# Patient Record
Sex: FEMALE | Race: BLACK OR AFRICAN AMERICAN
[De-identification: names, ages, dates, MRNs, and addresses within clinical notes are randomized per-mention and may not be internally consistent; named-entity substitution may affect disease eponyms.]

---

## 2018-01-24 ENCOUNTER — HOSPITAL ENCOUNTER (EMERGENCY)
Dept: HOSPITAL 92 - ERS | Age: 83
Discharge: HOME | End: 2018-01-24
Payer: MEDICARE

## 2018-01-24 DIAGNOSIS — Z79.891: ICD-10-CM

## 2018-01-24 DIAGNOSIS — I10: ICD-10-CM

## 2018-01-24 DIAGNOSIS — Z79.84: ICD-10-CM

## 2018-01-24 DIAGNOSIS — E78.5: ICD-10-CM

## 2018-01-24 DIAGNOSIS — N39.0: Primary | ICD-10-CM

## 2018-01-24 DIAGNOSIS — E11.9: ICD-10-CM

## 2018-01-24 LAB
ALBUMIN SERPL BCG-MCNC: 3.5 G/DL (ref 3.4–4.8)
ALP SERPL-CCNC: 49 U/L (ref 40–150)
ALT SERPL W P-5'-P-CCNC: (no result) U/L (ref 8–55)
ANION GAP SERPL CALC-SCNC: 13 MMOL/L (ref 10–20)
AST SERPL-CCNC: 12 U/L (ref 5–34)
BASOPHILS # BLD AUTO: 0 THOU/UL (ref 0–0.2)
BASOPHILS NFR BLD AUTO: 0.6 % (ref 0–1)
BILIRUB SERPL-MCNC: 0.2 MG/DL (ref 0.2–1.2)
BUN SERPL-MCNC: 27 MG/DL (ref 9.8–20.1)
CALCIUM SERPL-MCNC: 9.6 MG/DL (ref 7.8–10.44)
CHLORIDE SERPL-SCNC: 103 MMOL/L (ref 98–107)
CK MB SERPL-MCNC: 0.6 NG/ML (ref 0–6.6)
CO2 SERPL-SCNC: 27 MMOL/L (ref 23–31)
CREAT CL PREDICTED SERPL C-G-VRATE: 0 ML/MIN (ref 70–130)
CRYSTAL-AUWI FLAG: 1.4 (ref 0–15)
EOSINOPHIL # BLD AUTO: 0.2 THOU/UL (ref 0–0.7)
EOSINOPHIL NFR BLD AUTO: 2.3 % (ref 0–10)
GLOBULIN SER CALC-MCNC: 3.9 G/DL (ref 2.4–3.5)
GLUCOSE SERPL-MCNC: 181 MG/DL (ref 83–110)
HEV IGM SER QL: 5.8 (ref 0–7.99)
HGB BLD-MCNC: 11.1 G/DL (ref 12–16)
HYALINE CASTS #/AREA URNS LPF: (no result) LPF
LYMPHOCYTES # BLD: 3 THOU/UL (ref 1.2–3.4)
LYMPHOCYTES NFR BLD AUTO: 41.5 % (ref 21–51)
MCH RBC QN AUTO: 31 PG (ref 27–31)
MCV RBC AUTO: 95.2 FL (ref 81–99)
MONOCYTES # BLD AUTO: 0.5 THOU/UL (ref 0.11–0.59)
MONOCYTES NFR BLD AUTO: 7.2 % (ref 0–10)
NEUTROPHILS # BLD AUTO: 3.4 THOU/UL (ref 1.4–6.5)
NEUTROPHILS NFR BLD AUTO: 48.4 % (ref 42–75)
PATHC CAST-AUWI FLAG: 0.13 (ref 0–2.49)
PLATELET # BLD AUTO: 185 THOU/UL (ref 130–400)
POTASSIUM SERPL-SCNC: 3.8 MMOL/L (ref 3.5–5.1)
RBC # BLD AUTO: 3.58 MILL/UL (ref 4.2–5.4)
RBC UR QL AUTO: (no result) HPF (ref 0–3)
SODIUM SERPL-SCNC: 139 MMOL/L (ref 136–145)
SP GR UR STRIP: 1.02 (ref 1–1.04)
SPERM-AUWI FLAG: 0 (ref 0–9.9)
TROPONIN I SERPL DL<=0.01 NG/ML-MCNC: 0.02 NG/ML (ref ?–0.03)
WBC # BLD AUTO: 7.1 THOU/UL (ref 4.8–10.8)
WBC UR QL AUTO: (no result) HPF (ref 0–3)
YEAST-AUWI FLAG: 0 (ref 0–25)

## 2018-01-24 PROCEDURE — 81003 URINALYSIS AUTO W/O SCOPE: CPT

## 2018-01-24 PROCEDURE — 82140 ASSAY OF AMMONIA: CPT

## 2018-01-24 PROCEDURE — 87086 URINE CULTURE/COLONY COUNT: CPT

## 2018-01-24 PROCEDURE — 71046 X-RAY EXAM CHEST 2 VIEWS: CPT

## 2018-01-24 PROCEDURE — 84484 ASSAY OF TROPONIN QUANT: CPT

## 2018-01-24 PROCEDURE — 83605 ASSAY OF LACTIC ACID: CPT

## 2018-01-24 PROCEDURE — 96360 HYDRATION IV INFUSION INIT: CPT

## 2018-01-24 PROCEDURE — 85025 COMPLETE CBC W/AUTO DIFF WBC: CPT

## 2018-01-24 PROCEDURE — 93005 ELECTROCARDIOGRAM TRACING: CPT

## 2018-01-24 PROCEDURE — 36415 COLL VENOUS BLD VENIPUNCTURE: CPT

## 2018-01-24 PROCEDURE — 82550 ASSAY OF CK (CPK): CPT

## 2018-01-24 PROCEDURE — 70450 CT HEAD/BRAIN W/O DYE: CPT

## 2018-01-24 PROCEDURE — 82553 CREATINE MB FRACTION: CPT

## 2018-01-24 PROCEDURE — 80053 COMPREHEN METABOLIC PANEL: CPT

## 2018-01-24 PROCEDURE — 81015 MICROSCOPIC EXAM OF URINE: CPT

## 2018-01-24 NOTE — CT
CT BRAIN 

1/24/18

 

PROVIDED CLINICAL HISTORY:  

Altered mental status.

 

FINDINGS:  

Comparison made with the study dated 7/25/17. 

 

The ventricular system is normal in size and morphology. There is no evidence for intracranial hemorr
michelle or mass effect. There is partial opacification of the right frontal sinus and right sided ethmoi
d air cells as well as frontal ethmoidal recess,. The extracranial soft tissues and osseous structure
s appear otherwise unremarkable. 

 

IMPRESSION:  

No evidence for intracranial hemorrhage or mass effect. 

 

POS: [facility]

## 2018-01-24 NOTE — RAD
TWO VIEWS CHEST

1/24/18

 

PROVIDED CLINICAL HISTORY:  

Altered mental status. 

 

FINDINGS:  

Comparison 4/9/17.

 

Cardiac and mediastinal silhouette is unchanged in appearance. Atherosclerosis is noted involving the
 aortic arch. There is no focal consolidation, pleural fluid or pneumothorax apparent. 

 

IMPRESSION:  

No evidence for an acute cardiopulmonary process. 

 

POS: [facility]

## 2018-01-26 ENCOUNTER — HOSPITAL ENCOUNTER (INPATIENT)
Dept: HOSPITAL 92 - ERS | Age: 83
LOS: 4 days | Discharge: HOME HEALTH | DRG: 71 | End: 2018-01-30
Attending: HOSPITALIST | Admitting: HOSPITALIST
Payer: MEDICARE

## 2018-01-26 VITALS — BODY MASS INDEX: 37.4 KG/M2

## 2018-01-26 DIAGNOSIS — R74.8: ICD-10-CM

## 2018-01-26 DIAGNOSIS — D64.9: ICD-10-CM

## 2018-01-26 DIAGNOSIS — E78.5: ICD-10-CM

## 2018-01-26 DIAGNOSIS — E87.1: ICD-10-CM

## 2018-01-26 DIAGNOSIS — Z86.711: ICD-10-CM

## 2018-01-26 DIAGNOSIS — J84.10: ICD-10-CM

## 2018-01-26 DIAGNOSIS — E11.65: ICD-10-CM

## 2018-01-26 DIAGNOSIS — E78.00: ICD-10-CM

## 2018-01-26 DIAGNOSIS — E66.9: ICD-10-CM

## 2018-01-26 DIAGNOSIS — I25.10: ICD-10-CM

## 2018-01-26 DIAGNOSIS — Z96.652: ICD-10-CM

## 2018-01-26 DIAGNOSIS — E87.2: ICD-10-CM

## 2018-01-26 DIAGNOSIS — I13.0: ICD-10-CM

## 2018-01-26 DIAGNOSIS — Z91.19: ICD-10-CM

## 2018-01-26 DIAGNOSIS — N17.9: ICD-10-CM

## 2018-01-26 DIAGNOSIS — I50.32: ICD-10-CM

## 2018-01-26 DIAGNOSIS — Z88.0: ICD-10-CM

## 2018-01-26 DIAGNOSIS — G93.41: Primary | ICD-10-CM

## 2018-01-26 DIAGNOSIS — M19.90: ICD-10-CM

## 2018-01-26 DIAGNOSIS — Z66: ICD-10-CM

## 2018-01-26 DIAGNOSIS — N18.9: ICD-10-CM

## 2018-01-26 DIAGNOSIS — D72.829: ICD-10-CM

## 2018-01-26 DIAGNOSIS — I27.20: ICD-10-CM

## 2018-01-26 DIAGNOSIS — E83.42: ICD-10-CM

## 2018-01-26 LAB
ALBUMIN SERPL BCG-MCNC: 3 G/DL (ref 3.4–4.8)
ALP SERPL-CCNC: 42 U/L (ref 40–150)
ALT SERPL W P-5'-P-CCNC: (no result) U/L (ref 8–55)
ANION GAP SERPL CALC-SCNC: 13 MMOL/L (ref 10–20)
AST SERPL-CCNC: 16 U/L (ref 5–34)
BASOPHILS # BLD AUTO: 0 THOU/UL (ref 0–0.2)
BASOPHILS NFR BLD AUTO: 0.3 % (ref 0–1)
BILIRUB SERPL-MCNC: 0.3 MG/DL (ref 0.2–1.2)
BUN SERPL-MCNC: 35 MG/DL (ref 9.8–20.1)
CALCIUM SERPL-MCNC: 8.5 MG/DL (ref 7.8–10.44)
CHLORIDE SERPL-SCNC: 105 MMOL/L (ref 98–107)
CK MB SERPL-MCNC: 3.1 NG/ML (ref 0–6.6)
CK SERPL-CCNC: 527 U/L (ref 29–168)
CO2 SERPL-SCNC: 24 MMOL/L (ref 23–31)
CREAT CL PREDICTED SERPL C-G-VRATE: 0 ML/MIN (ref 70–130)
DRUG SCREEN CUTOFF: (no result)
EOSINOPHIL # BLD AUTO: 0.7 THOU/UL (ref 0–0.7)
EOSINOPHIL NFR BLD AUTO: 5.3 % (ref 0–10)
GLOBULIN SER CALC-MCNC: 3.6 G/DL (ref 2.4–3.5)
GLUCOSE SERPL-MCNC: 174 MG/DL (ref 83–110)
HGB BLD-MCNC: 10.2 G/DL (ref 12–16)
LYMPHOCYTES # BLD: 1.7 THOU/UL (ref 1.2–3.4)
LYMPHOCYTES NFR BLD AUTO: 13.8 % (ref 21–51)
MAGNESIUM SERPL-MCNC: 1.5 MG/DL (ref 1.6–2.6)
MCH RBC QN AUTO: 31.4 PG (ref 27–31)
MCV RBC AUTO: 96 FL (ref 81–99)
MEDTOX CONTROL LINE VALID?: (no result)
MEDTOX READER #: (no result)
MONOCYTES # BLD AUTO: 0.5 THOU/UL (ref 0.11–0.59)
MONOCYTES NFR BLD AUTO: 4.3 % (ref 0–10)
NEUTROPHILS # BLD AUTO: 9.6 THOU/UL (ref 1.4–6.5)
NEUTROPHILS NFR BLD AUTO: 76.3 % (ref 42–75)
PLATELET # BLD AUTO: 158 THOU/UL (ref 130–400)
POTASSIUM SERPL-SCNC: 3.6 MMOL/L (ref 3.5–5.1)
RBC # BLD AUTO: 3.24 MILL/UL (ref 4.2–5.4)
SODIUM SERPL-SCNC: 138 MMOL/L (ref 136–145)
SP GR UR STRIP: 1.02 (ref 1–1.04)
TROPONIN I SERPL DL<=0.01 NG/ML-MCNC: 0.04 NG/ML (ref ?–0.03)
TROPONIN I SERPL DL<=0.01 NG/ML-MCNC: 0.04 NG/ML (ref ?–0.03)
WBC # BLD AUTO: 12.6 THOU/UL (ref 4.8–10.8)

## 2018-01-26 PROCEDURE — 80048 BASIC METABOLIC PNL TOTAL CA: CPT

## 2018-01-26 PROCEDURE — 70450 CT HEAD/BRAIN W/O DYE: CPT

## 2018-01-26 PROCEDURE — 83880 ASSAY OF NATRIURETIC PEPTIDE: CPT

## 2018-01-26 PROCEDURE — 93880 EXTRACRANIAL BILAT STUDY: CPT

## 2018-01-26 PROCEDURE — 81003 URINALYSIS AUTO W/O SCOPE: CPT

## 2018-01-26 PROCEDURE — 84484 ASSAY OF TROPONIN QUANT: CPT

## 2018-01-26 PROCEDURE — 80306 DRUG TEST PRSMV INSTRMNT: CPT

## 2018-01-26 PROCEDURE — 76770 US EXAM ABDO BACK WALL COMP: CPT

## 2018-01-26 PROCEDURE — 85025 COMPLETE CBC W/AUTO DIFF WBC: CPT

## 2018-01-26 PROCEDURE — 83605 ASSAY OF LACTIC ACID: CPT

## 2018-01-26 PROCEDURE — 82553 CREATINE MB FRACTION: CPT

## 2018-01-26 PROCEDURE — 80053 COMPREHEN METABOLIC PANEL: CPT

## 2018-01-26 PROCEDURE — A4216 STERILE WATER/SALINE, 10 ML: HCPCS

## 2018-01-26 PROCEDURE — 94640 AIRWAY INHALATION TREATMENT: CPT

## 2018-01-26 PROCEDURE — 94664 DEMO&/EVAL PT USE INHALER: CPT

## 2018-01-26 PROCEDURE — 71045 X-RAY EXAM CHEST 1 VIEW: CPT

## 2018-01-26 PROCEDURE — 93306 TTE W/DOPPLER COMPLETE: CPT

## 2018-01-26 PROCEDURE — 83735 ASSAY OF MAGNESIUM: CPT

## 2018-01-26 PROCEDURE — 36415 COLL VENOUS BLD VENIPUNCTURE: CPT

## 2018-01-26 PROCEDURE — 80061 LIPID PANEL: CPT

## 2018-01-26 PROCEDURE — 87086 URINE CULTURE/COLONY COUNT: CPT

## 2018-01-26 PROCEDURE — 82550 ASSAY OF CK (CPK): CPT

## 2018-01-26 PROCEDURE — 36416 COLLJ CAPILLARY BLOOD SPEC: CPT

## 2018-01-26 PROCEDURE — 70551 MRI BRAIN STEM W/O DYE: CPT

## 2018-01-26 PROCEDURE — 93005 ELECTROCARDIOGRAM TRACING: CPT

## 2018-01-26 PROCEDURE — 51701 INSERT BLADDER CATHETER: CPT

## 2018-01-26 NOTE — RAD
AP VIEW OF THE CHEST

1/26/18

 

INDICATION:

Weakness. 

 

COMPARISON:  

Prior exam dated 1/24/18.

 

FINDINGS:  

There is cardiomegaly with pulmonary vascular congestion and perihilar edema. There is small bilatera
l pleural effusions, left greater than right. Right total shoulder replacement is similar. Diffuse os
teopenia is similar. 

 

IMPRESSION:  

Findings suggesting mild CHF. 

 

POS: [facility]

## 2018-01-26 NOTE — CT
NONCONTRAST HEAD CT

1/26/18

 

HISTORY: 

Right sided weakness and facial droop. Altered mental status.

 

COMPARISON:  

1/24/18.

 

TECHNIQUE:  

A noncontrast head CT is performed from skull base to skull vertex.

 

FINDINGS:  

No parenchymal hemorrhage. No extra-axial hematoma.  No midline shift. Basilar cisterns are patent. B
rain volume is age appropriate. Cortical gray-white matter differentiation is preserved. 

 

Ventricles and sulci are patent and symmetric. Stable sclerosis of the right frontal bone. Stable opa
cification of the right frontal sinus and anterior right ethmoid air cells. Adequate aeration of the 
mastoid air cells. 

 

IMPRESSION:  

No acute intracranial process. Stable changes in the right paranasal sinuses along with stable change
s of the osseous margins of the right frontal sinus suggesting chronic sinus disease.

 

POS: [facility]

## 2018-01-27 LAB
CHD RISK SERPL-RTO: 3 (ref ?–4.5)
CHOLEST SERPL-MCNC: 130 MG/DL
HDLC SERPL-MCNC: 44 MG/DL
LDLC SERPL CALC-MCNC: 68 MG/DL
TRIGL SERPL-MCNC: 92 MG/DL (ref ?–150)
TROPONIN I SERPL DL<=0.01 NG/ML-MCNC: 0.04 NG/ML (ref ?–0.03)

## 2018-01-27 PROCEDURE — B030ZZZ MAGNETIC RESONANCE IMAGING (MRI) OF BRAIN: ICD-10-PCS | Performed by: RADIOLOGY

## 2018-01-27 RX ADMIN — Medication SCH ML: at 09:46

## 2018-01-27 RX ADMIN — MOMETASONE FUROATE AND FORMOTEROL FUMARATE DIHYDRATE SCH PUFF: 200; 5 AEROSOL RESPIRATORY (INHALATION) at 07:21

## 2018-01-27 RX ADMIN — MOMETASONE FUROATE AND FORMOTEROL FUMARATE DIHYDRATE SCH PUFF: 200; 5 AEROSOL RESPIRATORY (INHALATION) at 19:30

## 2018-01-27 RX ADMIN — Medication SCH: at 21:30

## 2018-01-27 RX ADMIN — HYDROCODONE BITARTRATE AND ACETAMINOPHEN PRN TAB: 10; 325 TABLET ORAL at 13:55

## 2018-01-27 RX ADMIN — HEPARIN SODIUM SCH UNITS: 5000 INJECTION, SOLUTION INTRAVENOUS; SUBCUTANEOUS at 21:29

## 2018-01-27 RX ADMIN — HEPARIN SODIUM SCH UNITS: 5000 INJECTION, SOLUTION INTRAVENOUS; SUBCUTANEOUS at 09:46

## 2018-01-27 NOTE — HP
PRIMARY CARE DOCTOR:  None reported.

 

CODE STATUS:  The patient has expressed DNR/DNI status in front of her daughter.

 

CHIEF COMPLAINT:  Participation was right-sided weakness.

 

HISTORY OF PRESENT ILLNESS:  This is an 89-year-old female patient with past 
medical history of diabetes, hyperlipidemia, hypertension, pulmonary embolism 
years ago who came to the hospital after having right-sided weakness, 
associated with right-sided facial droop, symptoms has improved, lasted for few 
hours, no clear tears, no alleviating factors, gait was not affected.  As noted
, the patient had been treated for UTI 2 days ago.

 

REVIEW OF SYSTEMS: 

Constitutional:  The patient reported generalized weakness.

RESPIRATORY:  No cough, sputum production, no shortness of breath.

CARDIOVASCULAR:  No chest pain or palpitations.  No shortness of breath.

GASTROINTESTINAL:  No nausea or vomiting.  No diarrhea, no abdominal pain.

CNS:  No dizziness, headache.  The patient is not feeling lightheaded.  The 
patient had right-sided weakness and right-sided facial droop.

GENITOURINARY:  On burning with urination.

EXTREMITIES:  Leg swelling.

All other systems reviewed were negative except for the finding mentioned above.

 

PAST MEDICAL HISTORY:  Please see HPI.

 

SOCIAL HISTORY:  The patient had no history of alcohol use, known drug use.  No 
smoking history.

 

PAST SURGICAL HISTORY:  Left knee, back surgery, carpal tunnel surgery, 
shoulder and neck surgery.

 

PSYCHIATRIC HISTORY:  No previous psychiatric history.

 

KNOWN ALLERGIES:  To PENICILLIN.

 

REPORTED MEDICATIONS:  Amlodipine/valsartan 5 mg/320 mg 1 tablet daily, 
citalopram 10 mg 1 tablet once a day, metformin 500 mg 1 tablet once a day, 
Crestor 10 mg 1 tablet once a day, gabapentin 300 mg 1 tablet orally 3 times a 
day, hydralazine 50 mg the patient takes 75 mg 3 times a day, carvedilol 12.5 
mg 2 times a day, Advair Diskus 250mcg/50 mcg unknown dose, Lasix 20 mg orally 
once a day, Klor-Con 20 mEq once a day, fentanyl 72 hours patch the patient use 
75 mcg transdermal, Macrobid 100 mg 1 tablet orally 2 times a day.

 

PHYSICAL EXAMINATION:

VITAL SIGNS:  Blood pressure 101/49 with heart rate 68, respiratory rate 20, 
temperature 98, pain 0/10, oxygen saturation 100 on room air, blood pressure 
has been fluctuating between the 90s and 100s.

GENERAL APPEARANCE:  The patient is alert, oriented, no acute distress.

HEENT:  Eye:  Normal conjunctivae.  Moist mucous membranes.

NECK:  Anicteric.  No JVD.

RESPIRATORY:  Bilateral air entry.  No rales, bilateral wheezing, symmetric 
expansion.

CARDIOVASCULAR:  Normal rate, regular rhythm.  No murmurs or gallops.  No edema.

ABDOMEN:  Soft.  Normal bowel sounds.

MUSCULOSKELETAL:  Baseline range of motion and strength.  No tenderness.

NEUROLOGIC:  Baseline sensory.  No evidence of any new focal weakness.  
Baseline speech.  Cranial nerves seem to be intact.

PSYCHIATRIC:  The patient is in good mood.  No anxiety, oriented, optimal 
judgment.

 

LABORATORY DATA:  Reviewed.  The patient has white count 12.6, hemoglobin 10.2, 
platelet count 158.  Sodium 138, potassium 3.6, chloride 105, anion gap 13, BUN 
35, creatinine 2.06, GFR 27, glucose 174, lactic acid 1.9, calcium 9.5, 
magnesium 1.5, total bilirubin 0.3, AST 16, ALT 7, alkaline phosphatase 42.  CK 
527, troponin 0.036, second troponin 0.035, beta-natriuretic peptide 206.3.  
Serum total protein 6.6, albumin 3.0, globulin 3.6, albumin globulin ratio 0.8.
  Urine was reviewed and was negative.  Toxicology was reviewed and the patient 
has opiates in urine.  EKG was reviewed.  The patient has sinus arrhythmia in 
rate of 65.  No evidence of any acute ischemia.  This was discussed with her 
doctor.  Chest x-ray suggested the finding of mild CHF.  Brain CT showed no 
acute intracranial process, stable changes in the right paranasal sinuses along 
with stable changes of the _____ margin of the right frontal sinus suggesting 
chronic sinus disease.

 

ASSESSMENT AND PLAN:

1.  Possible transient ischemic attack.  The patient has right-sided numbness 
and tingling and right-sided facial droop, we will do stroke protocol, 
management depending on findings.this place pt at high risk given new 
neurological symptoms, 

2.  Leukocytosis, unclear etiology.  The patient had a recent urinary tract 
infection; however, urinary analysis was negative today.  We will send cultures 
and adjust medications as needed.

3.  Normocytic anemia, this is chronic.  The patient seems to have chronic 
kidney disease, could be secondary to it, can be managed as outpatient.

4.  Chronic kidney disease and previous records reviewed.  The BUN and 
creatinine has been increasing slowly.  It might be a component of acute kidney 
injury now.  The patient is showing congestive heart failure findings on chest x
-ray, so we are limited with fluids.  Might need Nephrology evaluation if not 
improving.

5.  Uncontrolled diabetes with blood sugar 174, hyperglycemia, we will treat 
with sliding scale, reconcile home medications.

6.  Acute hypomagnesemia.  The patient to receive magnesium replacement.

7.  Mildly elevated troponins in the range of 0.036, 0.035, may need Cardiology 
evaluation in the morning.  No chest pain.

8.  Possible congestive heart failure with beta-natriuretic peptide of 206, 
reconcile home medications.  The patient is in kidney failure.  We will not 
diurese aggressively, might need Nephrology assistance for diuresis if needed.

9.  Deep venous thrombosis prophylaxis.

 

[facility]

## 2018-01-27 NOTE — MRI
NONCONTRAST ENHANCED MRI BRAIN:

 

Date:  01/27/18 

 

HISTORY:  

89-year-old with history of TIA versus stroke. Generalized weakness. 

 

TECHNIQUE:  

Multiplanar, multisequence noncontrast enhanced MRI brain obtained. 

 

COMPARISON:  

Previous MRI from 11/22/16. 

 

FINDINGS:

Images demonstrate paranasal sinus disease in the right frontal sinus, as well as extensive paranasal
 sinus disease in right and left ethmoid sinuses. The patient has had bilateral cataract surgeries. 

 

The brain demonstrates old areas of infarction in the inferior cerebellar regions. There is age-appro
priate cortical atrophy. No evidence of acute intracranial masses, hemorrhages, or strokes seen. No e
vidence of areas of diffusion restriction seen. 

 

IMPRESSION: 

1.  Old areas of lacunar infarction in cerebellum. 

2.  No evidence of acute intracranial strokes, hemorrhages, or lesions seen. 

 

 

 

POS: [facility]

## 2018-01-27 NOTE — CON
DATE OF CONSULTATION:  01/27/2018

 

INDICATION FOR CONSULTATION:  An 89-year-old female with a TIA with indeterminate cardiac enzymes wit
h a history of coronary artery disease, we were asked to see her in consultation.

 

HISTORY OF PRESENT ILLNESS:  This is a very unfortunate 89-year-old female who was admitted after, I 
believe, some mental status changes were noted.  She also has some right-sided weakness.  She was in 
the emergency room.  There has been no acute findings on the CT scan and appears that she has possibl
y suffered a TIA, but she did not have any significant specific findings that would indicate any type
 of CVA at this time and appears to have been already resolved.  She has a long history of multiple m
edical problems, which include diabetes, chronic pain syndrome, and chronic kidney disease.  She has 
had a history of coronary artery disease.  She had, I believe, a cardiac catheterization in 2007 whic
h showed a 20% proximal right coronary artery lesion and she did not have any significant disease.  S
he had an echocardiogram in 2015, which showed an ejection fraction of 60-65%.  She does have a histo
ry of PE in 2007 as well as a history of COPD and some history of hypertension and peripheral neuropa
thy.  At this time, she appears to be very comfortable; howevear, her history of present illness is s
omewhat confused and is unable to give a clear history.

 

PAST MEDICAL HISTORY:  Significant for history of TIAs in the past, perhaps coronary artery disease, 
COPD, history of asbestos exposure, pulmonary embolus in 2007, hypertension, diabetes, peripheral jeremías
ropathy, chronic back pain, as well as chronic kidney disease.

 

PAST SURGICAL HISTORY:  She had hysterectomy, cholecystectomy, total left knee replacement, cataract 
surgery.  She had small bowel obstruction which required surgical correction.  Carpal tunnel release 
on the left side.

 

MEDICATIONS:  Prior to admission included Celexa, Neurontin, Crestor, metformin, Apresoline, insulin,
 Advair Diskus, Coreg, NovoLog, Humulin insulin 70/30, amlodipine, ipratropium, albuterol, DuoNebs, a
spirin 81 mg a day, hydrocodone/acetaminophen, furosemide, Lasix 20 mg tablets 40 mg b.i.d., Duragesi
c patches, tizanidine, and Coreg 12.5 mg 3 times a day.

 

ALLERGIES:  She is allergic to PENICILLIN.

 

SOCIAL HISTORY:  No history of alcohol or tobacco abuse.

 

FAMILY HISTORY:  Noncontributory.

 

REVIEW OF SYSTEMS:  A 12-point review of systems is difficult to obtain this patient who appears to b
e somewhat confused and unable to give a clear history or review of systems.

 

LABORATORY AND X-RAY FINDINGS:  Indicates hemoglobin of 10.2.  Her cardiac enzymes are indeterminate,
 the highest is 0.038.  Her BNP is 206, which is not comparable with significant congestive heart anabela
lure.  This may be some mild heart failure.  Her LDL was 68.  Her creatinine was 2.06 with BUN of 35.
  Blood sugar was 174, total CK was 527.

 

PHYSICAL EXAMINATION:

GENERAL:  Reveals an elderly female, who is in no acute distress.

VITAL SIGNS:  Blood pressure is 99/39, earlier was 122/66.  O2 saturation 98%, respiratory rate was 2
0, heart rate is 61 and regular.  She is afebrile.

HEENT:  Reveals the head to be normocephalic and atraumatic.  Carotid pulses are present.  Does not h
ear any bruits.

CHEST:  Clear to auscultation.

CARDIOVASCULAR:  Exam reveals a regular rhythm, somewhat bradycardiac.  Heart sounds are distant.  Ve
ry soft systolic murmur at the apex, otherwise unremarkable.

ABDOMEN:  Shows obesity with positive bowel sounds.  No organomegaly or masses are noted.  Femoral pu
lses are difficult to palpate due to large pannus.

EXTREMITIES:  Showed no clubbing, cyanosis, or edema.  I cannot palpate pedal pulses.

NEUROLOGIC:  The patient appears to be somewhat confused.  This may be just overall dementia.  Do not
 have any baseline.  There were no family members present.

 

IMPRESSION:

1.  Abnormal cardiac enzymes which would not be too unremarkable in someone who has chronic kidney di
sease and also has an elevated CK, uncertain the elevation of CK when she had a fall or not, but she 
had elevated CK also.  At this time, we would just continue to monitor her.  She did have some mild c
oronary artery disease several years ago, be unlikely for developed severe coronary artery disease in
 such a short amount of time given her overall age.  At this time, there will be no further cardiac e
valuation indicated.  As far as coronary artery disease is concerned, we will obtain echocardiogram a
nd we will evaluate that and further recommendations will be pending versus the results of the echoca
rdiogram.

2.  Diabetes.  This will be dealt with by the primary care service.

3.  Hypertension.  This is actually on the low side.  At this time, she is hypotensive and will need 
to follow this very carefully.  She may need more volume.  Once we evaluate the echocardiogram, we ca
n determine whether or not she may be volume depleted and would not appear so, looks like she just ha
s chronic kidney disease.

4.  History in the past of pulmonary emboli.

5.  History of hypercholesterolemia.  We would continue her present medications.

 

We will be more than happy to continue to follow the patient with you through her hospital course.  LILLIANA traylor will be very conservative in care of this patient without any aggressive cardiac interventions at t
his time.

## 2018-01-27 NOTE — ULT
ULTRASOUND RENAL BILATERAL:

 

Date:  01/27/18 

 

HISTORY:  

Kidney failure. 

 

COMPARISON:  

None. 

 

FINDINGS:

Right kidney measures 10.9 x 3.9 x 3.9 cm. Left kidney measures 11.4 x 5.7 x 5.8 cm. Pre-void urinary
 bladder volume is 235 mL. 

 

No hydronephrosis. Superior pole left kidney has a simple cyst, exophytic, measuring 4.3 x 3.5 x 3.5 
cm. 

 

IMPRESSION: 

Large superior pole simple cyst left kidney. 

 

 

 

POS: [facility]

## 2018-01-27 NOTE — CON
DATE OF CONSULTATION:  01/27/2018

 

CONSULTING PHYSICIAN:  Hospitalist Service

 

IMPRESSION:

1.  Possible transient ischemic attack with transient right-sided weakness.

2.  Diabetes.

3.  Hypotension.

4.  Chronic pain.

5.  Renal insufficiency.

 

PLAN:

1.  Restart aspirin and Crestor.

2.  Echocardiogram.

3.  Carotid ultrasound.

4.  MRI of the brain.

 

HISTORY OF PRESENT ILLNESS:  Ms. Edwards is an 89-year-old black female with the above noted medical pr
oblems.  Earlier this week, she had some mental status changes and was seen in the emergency room.  S
he was diagnosed with a urinary tract infection and treated with antibiotics.  She was discharged marilyn
e.  Daughter reported that she seemed to develop some right-sided weakness yesterday involving both a
rm and the leg.  She brought her back into the Emergency Room via ambulance.  Reportedly, she was hyp
otensive en route.  She had a workup in the ER including a CT scan of the brain and lab work, nothing
 remarkable was found other than the renal insufficiency.  Her daughter states that she had discontin
ued all of her medications for about 5 days prior to this event since admission.  At this point, she 
does not report any lateralized weakness or numbness.  She also does not report any headache, nausea,
 vomiting, vertigo, or difficulty swallowing.

 

PAST MEDICAL HISTORY:  As listed above.

 

ALLERGIES:  PENICILLIN.

 

MEDICATIONS:  List was reviewed.

 

SOCIAL HISTORY:  No tobacco or alcohol use.

 

FAMILY HISTORY:  Noncontributory.

 

REVIEW OF SYSTEMS:  Chronic back pain.  No complaints of chest pain or shortness of breath.

 

PHYSICAL EXAMINATION:

VITAL SIGNS:  Blood pressure 127/54, pulse 63, respirations 20, temperature 97.9.

HEENT:  Pupils are equal and minimally reactive.  Conjunctivae are a bit muddy.  Oropharynx is clear.


NECK:  No lymphadenopathy noted.

EXTREMITIES:  No cyanosis noted.

NEUROLOGIC:  She is alert and cooperative.  Her speech is fluent and clear.  Cranial nerves II throug
h XII are intact.  Motor exam showed symmetric antigravity strength in arms and legs.  Sensation was 
intact to light touch.  No tremor or dysmetria was present.  Gait was not tested.  No abnormal moveme
nts were seen.

 

SUMMARY:  An elderly lady who presented with possible right-sided weakness that has since improved.  
The hypotension may have been a contributing factor.  She was off her usual medications which include
 aspirin and Crestor.  I would restart these and complete her workup.

## 2018-01-28 LAB
ANION GAP SERPL CALC-SCNC: 13 MMOL/L (ref 10–20)
BASOPHILS # BLD AUTO: 0 THOU/UL (ref 0–0.2)
BASOPHILS NFR BLD AUTO: 0.2 % (ref 0–1)
BUN SERPL-MCNC: 48 MG/DL (ref 9.8–20.1)
CALCIUM SERPL-MCNC: 8.6 MG/DL (ref 7.8–10.44)
CHLORIDE SERPL-SCNC: 103 MMOL/L (ref 98–107)
CO2 SERPL-SCNC: 22 MMOL/L (ref 23–31)
CREAT CL PREDICTED SERPL C-G-VRATE: 23 ML/MIN (ref 70–130)
EOSINOPHIL # BLD AUTO: 0.5 THOU/UL (ref 0–0.7)
EOSINOPHIL NFR BLD AUTO: 6.8 % (ref 0–10)
GLUCOSE SERPL-MCNC: 132 MG/DL (ref 83–110)
HGB BLD-MCNC: 9.5 G/DL (ref 12–16)
LYMPHOCYTES # BLD: 1.6 THOU/UL (ref 1.2–3.4)
LYMPHOCYTES NFR BLD AUTO: 20.9 % (ref 21–51)
MCH RBC QN AUTO: 30.9 PG (ref 27–31)
MCV RBC AUTO: 95.8 FL (ref 81–99)
MONOCYTES # BLD AUTO: 0.6 THOU/UL (ref 0.11–0.59)
MONOCYTES NFR BLD AUTO: 7.3 % (ref 0–10)
NEUTROPHILS # BLD AUTO: 5 THOU/UL (ref 1.4–6.5)
NEUTROPHILS NFR BLD AUTO: 64.9 % (ref 42–75)
PLATELET # BLD AUTO: 147 THOU/UL (ref 130–400)
POTASSIUM SERPL-SCNC: 4.1 MMOL/L (ref 3.5–5.1)
RBC # BLD AUTO: 3.06 MILL/UL (ref 4.2–5.4)
SODIUM SERPL-SCNC: 134 MMOL/L (ref 136–145)
WBC # BLD AUTO: 7.7 THOU/UL (ref 4.8–10.8)

## 2018-01-28 RX ADMIN — Medication SCH: at 08:00

## 2018-01-28 RX ADMIN — ALUMINUM ZIRCONIUM TRICHLOROHYDREX GLY SCH: 0.2 STICK TOPICAL at 09:00

## 2018-01-28 RX ADMIN — MOMETASONE FUROATE AND FORMOTEROL FUMARATE DIHYDRATE SCH PUFF: 200; 5 AEROSOL RESPIRATORY (INHALATION) at 07:25

## 2018-01-28 RX ADMIN — MOMETASONE FUROATE AND FORMOTEROL FUMARATE DIHYDRATE SCH PUFF: 200; 5 AEROSOL RESPIRATORY (INHALATION) at 20:56

## 2018-01-28 RX ADMIN — HYDROCODONE BITARTRATE AND ACETAMINOPHEN PRN TAB: 10; 325 TABLET ORAL at 07:36

## 2018-01-28 RX ADMIN — HEPARIN SODIUM SCH: 5000 INJECTION, SOLUTION INTRAVENOUS; SUBCUTANEOUS at 21:23

## 2018-01-28 RX ADMIN — HEPARIN SODIUM SCH: 5000 INJECTION, SOLUTION INTRAVENOUS; SUBCUTANEOUS at 09:00

## 2018-01-28 RX ADMIN — Medication SCH ML: at 20:15

## 2018-01-28 RX ADMIN — ASPIRIN SCH: 81 TABLET ORAL at 09:00

## 2018-01-28 NOTE — ULT
ULTRASOUND CAROTID DOPPLER:

 

Date:  01/28/18/

 

HISTORY:  

TIA. 

 

COMPARISON:  

None. 

 

FINDINGS:

There is low graft plaque of the proximal internal carotid arteries bilaterally. Antegrade flow both 
vertebral arteries. 

 

Elevated peak systolic velocity within the left proximal and mid internal carotid artery. 

 

Right ICA/CCA ratio is 0.94. 

Left ICA/CCA ratio is 1.47. 

 

IMPRESSION: 

There is moderate, 50-69%, stenosis of the left internal carotid artery, proximal and mid portions. C
T angiogram may be beneficial. 

 

CODE T. 

 

 

 

 

POS: [facility]

## 2018-01-28 NOTE — PDOC.CTH
<Judy Pagan - Last Filed: 01/28/18 19:58>





Cardiology Progress Note





- Subjective





The pt seen and examined.  She is having severe SOB with wheezing after 

receiving about 400ml NS today.  She has to sit right up to breath now.  Per RN

, she also had low grade temp and greenish sputum.  





- Objective


 Vital Signs











  Temp Pulse Resp BP BP Pulse Ox


 


 01/28/18 15:40  99.4 F  65  18   143/64 H  97


 


 01/28/18 12:00  98.7 F  60  18   118/63  95


 


 01/28/18 10:40     117/60  


 


 01/28/18 09:00   113 H    











 











 01/27/18 01/28/18 01/29/18





 06:59 06:59 06:59


 


Intake Total   800


 


Balance   800














- Physical Examination


General/Neuro: alert & oriented x3


Neck: no JVD present


Lungs: other: (Wheezing)


Heart: RRR


Abdomen: soft


Extremities: other: (No edema)





- Telemetry


Telemetry Rhythm: SR 





- Labs


Result Diagrams: 


 01/28/18 05:10





 01/28/18 05:10


 Troponin/CKMB











CK-MB (CK-2)  3.1 ng/mL (0-6.6)   01/26/18  16:28    


 


Troponin I  0.035 ng/mL (< 0.028)  H  01/26/18  23:58    














- Assessment/Plan





1. SOB w/ wheezing - Wheezing after receiving 400ml NS today; also per RN, the 

pt had low-grade temp with greenish sputum today.  EF during this admission was 

60-65%; STAT CXR now; Possible Bopap? managed by PCP


2. TIA - stable; managed by neourologist


3. HTN - stable with Coreg, but the pt's family has refused Norvasc.


4. DM type 2 - on ACHS BG check with Metformin; managed by PCP


5. MARYLU - managed by nephrologist


6. Hyperlipidemia - on Statin


7. Hx of PE - 


MAR reviewed





Review of Systems





- Review of Systems


Constitutional: reports: weakness


EENTM: reports: no symptoms reported


Respiratory: reports: see HPI


Cardiac (ROS): reports: no symptoms reported





<FRANCK Craig - Last Filed: 01/28/18 20:44>





Cardiology Progress Note





- Objective


 Vital Signs











  Temp Pulse Resp BP BP Pulse Ox


 


 01/28/18 15:40  99.4 F  65  18   143/64 H  97


 


 01/28/18 12:00  98.7 F  60  18   118/63  95


 


 01/28/18 10:40     117/60  


 


 01/28/18 09:00   113 H    











 











 01/27/18 01/28/18 01/29/18





 06:59 06:59 06:59


 


Intake Total   800


 


Balance   800














- Labs


Result Diagrams: 


 01/28/18 05:10





 01/28/18 05:10


 Troponin/CKMB











CK-MB (CK-2)  3.1 ng/mL (0-6.6)   01/26/18  16:28    


 


Troponin I  0.035 ng/mL (< 0.028)  H  01/26/18  23:58    














- Assessment/Plan





Pt. was seen and eval. by me this PM. She appears more confused this pm. She 

denies any symptoms but is not reliable.I agree with the A/P by the NP.


Chest : few basilar rales, wheezing. O2 sats are good. RRR.

## 2018-01-28 NOTE — PRG
DATE OF SERVICE:  01/28/2018

 

SUBJECTIVE:  Ms. Edwards had some transient confusion earlier today with some slurred speech.  The nurs
e thought she might have a bit of facial droop.  This occurred while she was sitting up on the bedsid
e toilet.  I checked her vital signs and her blood pressure was in a normal range.  She was moved jame
k to bed and her symptoms seem to improve.

 

She had an MRI of the brain done yesterday, which failed to reveal any acute ischemic changes.  Carot
id Doppler shows 50%-69% left carotid stenosis.  Echocardiogram showed a normal ejection fraction of 
60%-65% without other anomalies.

 

She has been afebrile overnight.

 

PHYSICAL EXAMINATION:  On exam, she was alert and cooperative, is complaining of pain in her extremit
ies.  Her speech is fluent and clear.  Cranial nerves were intact.  She had equal  strength.  No 
fix or drift was noted.  Sensation was intact to light touch.

 

I do not see anything remarkable on her exam.  It is possible that the narcotics might be playing a r
ole in her transient slurring and confusion.  Her neurologic workup is, otherwise, unremarkable at 
is point.  Continue antiplatelet therapy and statin.

## 2018-01-28 NOTE — CON
DATE OF CONSULTATION:  01/28/2018

 

CONSULTING PHYSICIAN:  Dr. Styles.

 

REASON FOR CONSULTATION:  Acute kidney injury and oliguria.

 

REASON FOR ADMISSION:  Right-sided weakness.

 

HISTORY OF PRESENT ILLNESS:  This is an 89-year-old female with history of type 2 diabetes, hypertens
ion, hyperlipidemia, who came to the hospital with above complaints and was found to have inability t
o urinate and creatinine level going up, this morning was 2.5.  Patient was on valsartan, which was s
topped and she was also on metformin.  She was given IV fluids at 50 mL per hour, but then she was hy
pertensive yesterday and IV fluids were off.  Patient denies any abdominal pain, no nausea, vomiting,
 no chest pain.  Family members at the bedside.

 

PAST MEDICAL HISTORY:  Positive for hyperlipidemia, hypertension, diabetes.

 

PAST SURGICAL HISTORY:  Left knee surgery, back surgery, carpal tunnel surgery, shoulder and neck kelly
geries.

 

HOME MEDICATIONS:  Include amlodipine, valsartan, citalopram, metformin, Crestor, gabapentin, hydrala
zine, carvedilol, Advair, Lasix, Klor-Con, Macrobid.

 

ALLERGIES:  PENICILLIN.

 

SOCIAL HISTORY:  No smoking, alcohol, or illicit drug abuse.

 

FAMILY HISTORY:  No history of any kidney disease.

 

REVIEW OF SYSTEM:  The following complete review of systems was negative, unless otherwise mentioned 
in the HPI or below:

Constitutional:  Weight loss or gain, ability to conduct usual activities.

Skin:  Rash, itching.

Eyes:  Double vision, pain.

ENT/Mouth:  Nose bleeding, neck stiffness, pain, tenderness.

Cardiovascular:  Palpitations, dyspnea on exertion, orthopnea.

Respiratory:  Shortness of breath, wheezing, cough, hemoptysis, fever or night sweats.

Gastrointestinal:  Poor appetite, abdominal pain, heartburn, nausea, vomiting, constipation, or diarr
hea.

Genitourinary:  Urgency, frequency, dysuria, nocturia.

Musculoskeletal:  Pain, swelling.

Neurologic/Psychiatric:  Anxiety, depression.

Allergy/Immunologic:  Skin rash, bleeding tendency.

 

PHYSICAL EXAMINATION:

GENERAL:  This is a well-built female in no apparent distress.

VITAL SIGNS:  Temperature 99.4, pulse 64, respiratory rate 18, blood pressure 118/63.

 

LABORATORY DATA:  Potassium is 4.1, BUN is 40, creatinine is 2.5.

 

ASSESSMENT AND PLAN:

1.  Acute kidney injury - most likely from ischemic acute tubular necrosis.  We will increase IV flui
ds to 100 mL per hour.  I talked with the nurse, check bladder ultrasound, renal ultrasound yesterday
 was unremarkable.

2.  Hyponatremia.

3.  Acidosis.

4.  Hypertension.

5.  Anemia.

 

MEDICATION LIST:  Reviewed.  We will stop metformin and valsartan.  Renally dose gabapentin and cauti
ous use of pain medication _____ built up.  Continue supportive care with IV antibiotics.

 

Plan is to increase IV fluids to 100 mL per hour.  Avoid nephrotoxins.  Renally dose all the medicine
s and we will follow.  Family updated at the bedside.

 

Thank you for the consultation.

## 2018-01-28 NOTE — RAD
CHEST 1 VIEW:

 

Date:  01/28/18 

 

HISTORY:  

Shortness of breath. 

 

COMPARISON:  

01/28/16. 

 

FINDINGS:

Atherosclerosis of aorta. Stable cardiac silhouette. Calcified lymph nodes in the left hilum are note
d. Slight increased prominence of the pulmonary vasculature. Reticulonodular opacities are noted. No 
masses or consolidation. No pneumothorax. Right shoulder replacement is unchanged. 

 

IMPRESSION: 

Pulmonary vascular prominence and interstitial opacities. Correlate for volume overload. 

 

 

POS: [facility]

## 2018-01-29 LAB
ANION GAP SERPL CALC-SCNC: 9 MMOL/L (ref 10–20)
BASOPHILS # BLD AUTO: 0 THOU/UL (ref 0–0.2)
BASOPHILS NFR BLD AUTO: 0.1 % (ref 0–1)
BUN SERPL-MCNC: 44 MG/DL (ref 9.8–20.1)
CALCIUM SERPL-MCNC: 9.4 MG/DL (ref 7.8–10.44)
CHLORIDE SERPL-SCNC: 105 MMOL/L (ref 98–107)
CO2 SERPL-SCNC: 27 MMOL/L (ref 23–31)
CREAT CL PREDICTED SERPL C-G-VRATE: 51 ML/MIN (ref 70–130)
EOSINOPHIL # BLD AUTO: 0.6 THOU/UL (ref 0–0.7)
EOSINOPHIL NFR BLD AUTO: 8.3 % (ref 0–10)
GLUCOSE SERPL-MCNC: 119 MG/DL (ref 83–110)
HGB BLD-MCNC: 9.4 G/DL (ref 12–16)
LYMPHOCYTES # BLD: 1.9 THOU/UL (ref 1.2–3.4)
LYMPHOCYTES NFR BLD AUTO: 27.6 % (ref 21–51)
MCH RBC QN AUTO: 31 PG (ref 27–31)
MCV RBC AUTO: 95.3 FL (ref 81–99)
MONOCYTES # BLD AUTO: 0.6 THOU/UL (ref 0.11–0.59)
MONOCYTES NFR BLD AUTO: 8.1 % (ref 0–10)
NEUTROPHILS # BLD AUTO: 3.8 THOU/UL (ref 1.4–6.5)
NEUTROPHILS NFR BLD AUTO: 55.9 % (ref 42–75)
PLATELET # BLD AUTO: 166 THOU/UL (ref 130–400)
POTASSIUM SERPL-SCNC: 4.2 MMOL/L (ref 3.5–5.1)
RBC # BLD AUTO: 3.03 MILL/UL (ref 4.2–5.4)
SODIUM SERPL-SCNC: 137 MMOL/L (ref 136–145)
WBC # BLD AUTO: 6.9 THOU/UL (ref 4.8–10.8)

## 2018-01-29 RX ADMIN — HEPARIN SODIUM SCH UNITS: 5000 INJECTION, SOLUTION INTRAVENOUS; SUBCUTANEOUS at 20:48

## 2018-01-29 RX ADMIN — ASPIRIN SCH MG: 81 TABLET ORAL at 10:05

## 2018-01-29 RX ADMIN — HEPARIN SODIUM SCH UNITS: 5000 INJECTION, SOLUTION INTRAVENOUS; SUBCUTANEOUS at 10:06

## 2018-01-29 RX ADMIN — MOMETASONE FUROATE AND FORMOTEROL FUMARATE DIHYDRATE SCH PUFF: 200; 5 AEROSOL RESPIRATORY (INHALATION) at 07:03

## 2018-01-29 RX ADMIN — MOMETASONE FUROATE AND FORMOTEROL FUMARATE DIHYDRATE SCH PUFF: 200; 5 AEROSOL RESPIRATORY (INHALATION) at 18:51

## 2018-01-29 RX ADMIN — Medication SCH ML: at 10:09

## 2018-01-29 RX ADMIN — ALUMINUM ZIRCONIUM TRICHLOROHYDREX GLY SCH: 0.2 STICK TOPICAL at 11:36

## 2018-01-29 NOTE — PRG
DATE OF SERVICE:  01/29/2018

 

SUBJECTIVE:  An 89-year-old female being seen for acute kidney injury.  The patient denies any nausea
, vomiting or chest pain.

 

PHYSICAL EXAMINATION:

GENERAL:  Patient is awake, alert.

VITAL SIGNS:  Afebrile, pulse 92, breathing at 16, blood pressure 156/62.

 

OBJECTIVE:   See above.

Awake, alert, in no acute distress.

GENERAL APPEARANCE AND MENTAL STATUS:  Fair.

HEAD/NECK:  Normocephalic.   Atraumatic.

EYES:  EOMI.  No deformity.

EARS:  Clear.  No ulcers.

NOSE:   Intact.  No lesions.

MOUTH:  Clear.  No discharge.

THROAT:  Clear.  No exudate.

LUNGS:   Clear.  No crackles.

CARDIAC:   S1, S2.  No rub.

ABDOMEN:   Benign.  BS+.

GENITALIA/RECTUM:  Moran absent.

BACK/EXTREMITIES:  Edema 0+ Ulcer-

NEUROLOGICAL:  Alert and motor intact.                     

SKIN:  Rash- Bruise-  

LYMPHATICS:  Edema- Ulcer-

 

LABORATORY:  Hemoglobin 9.4, creatinine 1.1.

 

RECOMMENDATIONS:

1.  Acute kidney injury, improved.

2.  Hypertension.

3.  Anemia, stable.

4.  Medications based on GFR are appropriate.

## 2018-01-30 VITALS — SYSTOLIC BLOOD PRESSURE: 161 MMHG | TEMPERATURE: 98.4 F | DIASTOLIC BLOOD PRESSURE: 94 MMHG

## 2018-01-30 LAB
ANION GAP SERPL CALC-SCNC: 12 MMOL/L (ref 10–20)
BASOPHILS # BLD AUTO: 0 THOU/UL (ref 0–0.2)
BASOPHILS NFR BLD AUTO: 0.2 % (ref 0–1)
BUN SERPL-MCNC: 23 MG/DL (ref 9.8–20.1)
CALCIUM SERPL-MCNC: 10 MG/DL (ref 7.8–10.44)
CHLORIDE SERPL-SCNC: 107 MMOL/L (ref 98–107)
CO2 SERPL-SCNC: 27 MMOL/L (ref 23–31)
CREAT CL PREDICTED SERPL C-G-VRATE: 74 ML/MIN (ref 70–130)
EOSINOPHIL # BLD AUTO: 0.4 THOU/UL (ref 0–0.7)
EOSINOPHIL NFR BLD AUTO: 6.7 % (ref 0–10)
GLUCOSE SERPL-MCNC: 129 MG/DL (ref 83–110)
HGB BLD-MCNC: 10.3 G/DL (ref 12–16)
LYMPHOCYTES # BLD: 1.7 THOU/UL (ref 1.2–3.4)
LYMPHOCYTES NFR BLD AUTO: 27.2 % (ref 21–51)
MCH RBC QN AUTO: 30.8 PG (ref 27–31)
MCV RBC AUTO: 95.5 FL (ref 81–99)
MONOCYTES # BLD AUTO: 0.5 THOU/UL (ref 0.11–0.59)
MONOCYTES NFR BLD AUTO: 7.6 % (ref 0–10)
NEUTROPHILS # BLD AUTO: 3.7 THOU/UL (ref 1.4–6.5)
NEUTROPHILS NFR BLD AUTO: 58.3 % (ref 42–75)
PLATELET # BLD AUTO: 172 THOU/UL (ref 130–400)
POTASSIUM SERPL-SCNC: 3.9 MMOL/L (ref 3.5–5.1)
RBC # BLD AUTO: 3.33 MILL/UL (ref 4.2–5.4)
SODIUM SERPL-SCNC: 142 MMOL/L (ref 136–145)
WBC # BLD AUTO: 6.3 THOU/UL (ref 4.8–10.8)

## 2018-01-30 RX ADMIN — HEPARIN SODIUM SCH UNITS: 5000 INJECTION, SOLUTION INTRAVENOUS; SUBCUTANEOUS at 09:01

## 2018-01-30 RX ADMIN — ALUMINUM ZIRCONIUM TRICHLOROHYDREX GLY SCH EACH: 0.2 STICK TOPICAL at 09:10

## 2018-01-30 RX ADMIN — Medication SCH: at 10:31

## 2018-01-30 RX ADMIN — Medication SCH ML: at 05:18

## 2018-01-30 RX ADMIN — MOMETASONE FUROATE AND FORMOTEROL FUMARATE DIHYDRATE SCH PUFF: 200; 5 AEROSOL RESPIRATORY (INHALATION) at 07:08

## 2018-01-30 RX ADMIN — ASPIRIN SCH MG: 81 TABLET ORAL at 09:03

## 2018-01-31 NOTE — DIS
DATE OF ADMISSION:  01/27/2018

 

DATE OF DISCHARGE:  01/30/2018

 

CONDITION AT THE TIME OF DISCHARGE:  Stable and improved.

 

PRIMARY CARE PHYSICIAN:  Bhavya Pitts M.D.

 

DISCHARGE DIAGNOSES:

1.  Altered mental status.

2.  Acute renal insufficiency.

3.  Chronic congestive heart failure.

4.  Normocytic normochromic anemia.

5.  Diabetes.

6.  Anxiety.

7.  Hypertension.

8.  Osteoarthritis.

9.  Pulmonary hypertension.

10.  Physical deconditioning.

 

DISCHARGE DISPOSITION:  Home with home health as per the patient's request.

 

DISCHARGE MEDICATIONS:  Nebulizers as needed, NovoLog 70/30 five units at bedtime and 10 units in the
 morning, Advair Diskus 2 inhalations b.i.d., Duragesic patch 50 mcg every 2 days, Lasix 40 mg p.o. b
.i.d., Crestor 10 mg daily, aspirin 81 mg daily, hydralazine 75 mg p.o. t.i.d., amlodipine/valsartan 
5/320 one tablet daily, tizanidine as needed, Coreg 12.5 mg p.o. b.i.d., Neurontin 300 mg p.o. b.i.d.
, Celexa 10 mg daily, metformin 500 mg daily.

 

CONSULTATIONS IN-HOUSE:

1.  Neurology, Dr. Sajan Lynn.

2.  Cardiology, Dr. Maira Craig.

3.  Nephrology, Dr. Honeycutt.

 

PROCEDURES DONE IN THE HOSPITAL:

1.  Renal ultrasound, which showed large superior pole simple cyst of the left kidney.

2.  CT scan of the brain upon presentation, which is negative for any acute intracranial process.  Kiara traylor has chronic sinus disease.

3.  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
s or strokes.

4.  Carotid Doppler ultrasound, which was once again negative for any hemodynamically significant beatriz
nosis.

5.  Transthoracic echocardiogram, which showed preserved ejection fraction of 60% to 65% with normal 
size and wall motions.

 

HISTORY OF PRESENTING ILLNESS:  Ms. Edwards is an 89-year-old female with known history of chronic carisa
estive heart failure with multiple hospitalizations in the past, who presented to the emergency room 
with complaints of altered mental status as well as some right-sided weakness and right-sided facial 
droop.  The patient's symptoms have resolved prior to her admission to the hospital.  She was admitte
d with a presumptive diagnosis of possible transient ischemic attack.  Initial EKG and a CT scan of t
he brain were unremarkable.  Chest x-ray was adjusted mild congestive heart failure.  She was admitte
d to the stroke floor and Neurology was consulted.  Please see admission history and physical for fur
ther details.

 

The patient's symptoms did not return.  She had some waxing and waning of her mentation, which eventu
ally stabilized and she was back to her being herself and awake, alert, and oriented x3.  Neurology s
aw her and her TIA/CVA workup was essentially unremarkable.  It was thought more so to be metabolic e
ncephalopathy.

 

She did have mild elevation of her cardiac enzymes upon presentation to 0.036 range.  Cardiology was 
consulted with regards and Dr. Craig saw the patient.  She had no further recommendation except for co
ntinuation of medical management.  The patient has chronically elevated troponins in the setting of c
hronic kidney disease.

 

Nephrology was consulted as the patient had some evidence of acute kidney injury and oliguria.  This 
was treated with some IV fluids and her renal function improved back to baseline.  On the day of disc
harge, her GFR is at 89.

 

She was seen on a day to day basis and all of her workup was negative.  Her urine culture showed non-
hemolytic streptococci with less than 5000 CFU per HPF.

 

She was seen and examined prior to discharge.  Discharge plan was discussed with the patient and kulwinder sutton.  She wanted to go home and did not want to go rehabilitation.  This was arranged for her.

 

PHYSICAL EXAMINATION:

VITAL SIGNS:  Temperature 98.4, pulse is 72, respirations 14, saturating 91% on room air.  Blood pres
sure 161/94.

GENERAL:  In no acute distress.

CHEST:  Clear to auscultation.  Rate and rhythm is regular.

NEUROLOGIC:  Nonfocal.

 

The patient is instructed to follow with her primary care physician and home health was arranged for 
her.  She is back to her baseline.  All questions were answered for the family.  Prescriptions were p
rovided and medications were reconciled as needed.

 

Total time spent in the discharge of this patient 32 minutes.

## 2018-02-01 ENCOUNTER — HOSPITAL ENCOUNTER (INPATIENT)
Dept: HOSPITAL 92 - ERS | Age: 83
LOS: 4 days | Discharge: NURSING, REHAB OR LONG-TERM CARE | DRG: 177 | End: 2018-02-05
Attending: INTERNAL MEDICINE | Admitting: INTERNAL MEDICINE
Payer: MEDICARE

## 2018-02-01 VITALS — BODY MASS INDEX: 27.1 KG/M2

## 2018-02-01 DIAGNOSIS — Z86.711: ICD-10-CM

## 2018-02-01 DIAGNOSIS — E11.22: ICD-10-CM

## 2018-02-01 DIAGNOSIS — Z79.82: ICD-10-CM

## 2018-02-01 DIAGNOSIS — N18.3: ICD-10-CM

## 2018-02-01 DIAGNOSIS — Z66: ICD-10-CM

## 2018-02-01 DIAGNOSIS — F32.9: ICD-10-CM

## 2018-02-01 DIAGNOSIS — E78.5: ICD-10-CM

## 2018-02-01 DIAGNOSIS — J69.0: Primary | ICD-10-CM

## 2018-02-01 DIAGNOSIS — I50.33: ICD-10-CM

## 2018-02-01 DIAGNOSIS — Z79.84: ICD-10-CM

## 2018-02-01 DIAGNOSIS — J44.1: ICD-10-CM

## 2018-02-01 DIAGNOSIS — E66.9: ICD-10-CM

## 2018-02-01 DIAGNOSIS — J96.01: ICD-10-CM

## 2018-02-01 DIAGNOSIS — I13.0: ICD-10-CM

## 2018-02-01 DIAGNOSIS — I27.20: ICD-10-CM

## 2018-02-01 DIAGNOSIS — Z87.891: ICD-10-CM

## 2018-02-01 DIAGNOSIS — M19.90: ICD-10-CM

## 2018-02-01 DIAGNOSIS — D64.9: ICD-10-CM

## 2018-02-01 DIAGNOSIS — J44.0: ICD-10-CM

## 2018-02-01 DIAGNOSIS — F41.9: ICD-10-CM

## 2018-02-01 LAB
ALBUMIN SERPL BCG-MCNC: 3.4 G/DL (ref 3.4–4.8)
ALP SERPL-CCNC: 61 U/L (ref 40–150)
ALT SERPL W P-5'-P-CCNC: (no result) U/L (ref 8–55)
ANION GAP SERPL CALC-SCNC: 14 MMOL/L (ref 10–20)
AST SERPL-CCNC: 18 U/L (ref 5–34)
BASOPHILS # BLD AUTO: 0 THOU/UL (ref 0–0.2)
BASOPHILS NFR BLD AUTO: 0.2 % (ref 0–1)
BILIRUB SERPL-MCNC: 0.9 MG/DL (ref 0.2–1.2)
BUN SERPL-MCNC: 12 MG/DL (ref 9.8–20.1)
CALCIUM SERPL-MCNC: 10 MG/DL (ref 7.8–10.44)
CHLORIDE SERPL-SCNC: 100 MMOL/L (ref 98–107)
CK MB SERPL-MCNC: 0.6 NG/ML (ref 0–6.6)
CK SERPL-CCNC: 125 U/L (ref 29–168)
CO2 SERPL-SCNC: 29 MMOL/L (ref 23–31)
CREAT CL PREDICTED SERPL C-G-VRATE: 0 ML/MIN (ref 70–130)
EOSINOPHIL # BLD AUTO: 0.2 THOU/UL (ref 0–0.7)
EOSINOPHIL NFR BLD AUTO: 1.4 % (ref 0–10)
GLOBULIN SER CALC-MCNC: 4.5 G/DL (ref 2.4–3.5)
GLUCOSE SERPL-MCNC: 194 MG/DL (ref 83–110)
HGB BLD-MCNC: 11.5 G/DL (ref 12–16)
LYMPHOCYTES # BLD: 1.2 THOU/UL (ref 1.2–3.4)
LYMPHOCYTES NFR BLD AUTO: 8.4 % (ref 21–51)
MCH RBC QN AUTO: 30.4 PG (ref 27–31)
MCV RBC AUTO: 95.2 FL (ref 81–99)
MONOCYTES # BLD AUTO: 0.3 THOU/UL (ref 0.11–0.59)
MONOCYTES NFR BLD AUTO: 2.1 % (ref 0–10)
NEUTROPHILS # BLD AUTO: 12.2 THOU/UL (ref 1.4–6.5)
NEUTROPHILS NFR BLD AUTO: 87.9 % (ref 42–75)
PLATELET # BLD AUTO: 250 THOU/UL (ref 130–400)
POTASSIUM SERPL-SCNC: 3.5 MMOL/L (ref 3.5–5.1)
RBC # BLD AUTO: 3.78 MILL/UL (ref 4.2–5.4)
SODIUM SERPL-SCNC: 139 MMOL/L (ref 136–145)
TROPONIN I SERPL DL<=0.01 NG/ML-MCNC: 0.03 NG/ML (ref ?–0.03)
WBC # BLD AUTO: 13.9 THOU/UL (ref 4.8–10.8)

## 2018-02-01 PROCEDURE — 96375 TX/PRO/DX INJ NEW DRUG ADDON: CPT

## 2018-02-01 PROCEDURE — 83880 ASSAY OF NATRIURETIC PEPTIDE: CPT

## 2018-02-01 PROCEDURE — 94760 N-INVAS EAR/PLS OXIMETRY 1: CPT

## 2018-02-01 PROCEDURE — 83605 ASSAY OF LACTIC ACID: CPT

## 2018-02-01 PROCEDURE — 74177 CT ABD & PELVIS W/CONTRAST: CPT

## 2018-02-01 PROCEDURE — 84484 ASSAY OF TROPONIN QUANT: CPT

## 2018-02-01 PROCEDURE — 87040 BLOOD CULTURE FOR BACTERIA: CPT

## 2018-02-01 PROCEDURE — 96365 THER/PROPH/DIAG IV INF INIT: CPT

## 2018-02-01 PROCEDURE — 36415 COLL VENOUS BLD VENIPUNCTURE: CPT

## 2018-02-01 PROCEDURE — 71045 X-RAY EXAM CHEST 1 VIEW: CPT

## 2018-02-01 PROCEDURE — A4216 STERILE WATER/SALINE, 10 ML: HCPCS

## 2018-02-01 PROCEDURE — 80048 BASIC METABOLIC PNL TOTAL CA: CPT

## 2018-02-01 PROCEDURE — 80053 COMPREHEN METABOLIC PANEL: CPT

## 2018-02-01 PROCEDURE — 85025 COMPLETE CBC W/AUTO DIFF WBC: CPT

## 2018-02-01 PROCEDURE — 93005 ELECTROCARDIOGRAM TRACING: CPT

## 2018-02-01 PROCEDURE — 94640 AIRWAY INHALATION TREATMENT: CPT

## 2018-02-01 PROCEDURE — 36416 COLLJ CAPILLARY BLOOD SPEC: CPT

## 2018-02-01 PROCEDURE — 82553 CREATINE MB FRACTION: CPT

## 2018-02-01 RX ADMIN — INSULIN LISPRO PRN UNIT: 100 INJECTION, SOLUTION INTRAVENOUS; SUBCUTANEOUS at 17:20

## 2018-02-01 RX ADMIN — INSULIN LISPRO PRN UNIT: 100 INJECTION, SOLUTION INTRAVENOUS; SUBCUTANEOUS at 20:48

## 2018-02-01 RX ADMIN — HYDROCODONE BITARTRATE AND ACETAMINOPHEN PRN TAB: 5; 325 TABLET ORAL at 17:42

## 2018-02-01 NOTE — HP
PRIMARY CARE PHYSICIAN:  Bhavya Pitts M.D.

 

CHIEF COMPLAINT:  Epigastric pain and shortness of breath.

 

HISTORY OF PRESENT ILLNESS:  The history of present illness is taken primarily from the patient's son
 who is at the bed.  The patient also does contribute some information as well.  Ms. Edwards is a very 
pleasant 89-year-old female that has a history of COPD as well as chronic kidney disease and diabetes
 mellitus.  She was actually recently discharged from our facility approximately 3 days ago.  At that
 time, she was admitted for some confusion as well as some right-sided weakness.  She had a full work
up including an MRI of the brain as well as an echocardiogram and carotid Dopplers, these were essent
ially negative.  She did have some mild increase in velocities in the carotid artery, but it was attr
ibuted to being a transient ischemic attack.  Her symptoms had essentially resolved.  She also had de
veloped some acute on chronic kidney disease and this was likely from volume depletion and this has a
lso resolved.  The patient was doing well at home other than being a little bit weaker than her basel
ine according to her family when this morning she began complaining of some epigastric discomfort and
 some cramping.  She also had some nausea off and on.  She also appeared to be short of breath and wa
s having trouble breathing.  They called EMS and they came and placed her on oxygen and she was impro
selena.  Then when they arrived in the emergency room, she was being evaluated and in the process of the
 evaluation, they found that she had 2 fentanyl patches on.  They removed both patches and then her s
ymptoms of shortness of breath as well as the epigastric pain actually resolved.  Now, she feels back
 at her baseline.  However, during the course of her evaluation in the ER, she had a CT scan of her a
bdomen and pelvis performed and on the CT scan, it demonstrated that she had a persistent right lower
 lobe peripheral airspace disease which was concerning for either adenocarcinoma versus a chronic are
a of pulmonary consolidation.  It is actually for this reason that she is being admitted to the [facility].  She also may have some degree of acute on chronic systolic heart failure.  Otherwise, the patie
nt has no other complaints.  She complains of chronic headache off and on and also chronic back pain.


 

REVIEW OF SYSTEMS:  Constitutional:  There have been no fevers or chills.  No night sweats, no weight
 loss.  HEENT:  She does complain of headache which she says is chronic.  No visual changes, no sore 
throat, no rhinorrhea, no adenopathy.  Pulmonary:  As in the history of present illness.  She denies 
any cough or congestion.  No hemoptysis.  Cardiovascular:  She denies any chest pain.  She did have s
ome shortness of breath starting this morning at rest.  No PND, no orthopnea.  No lower extremity mike
ma.  Gastrointestinal:  She has complained of some epigastric discomfort, no vomiting.  She has been 
constipated which she says is chronic.  No blood in the stool.  Her last bowel movement was this morn
ing and she says it was normal.  Genitourinary:  No urinary frequency, hematuria, or hesitancy.  Neur
ologic:  She denies any focal weakness.  Her family does say that she seems to be more or less weak a
ll around, more generalized with difficulty with activities of daily living.  Musculoskeletal:  She d
oes complain of chronic low back pain, but no focal weakness once again.  Skin/Integument:  No skin c
hanges.  No rashes.  Psychiatric:  No symptoms of anxiety or depression.

 

PAST MEDICAL HISTORY:  Significant for diabetes mellitus, hyperlipidemia, remote history of pulmonary
 embolism, pulmonary hypertension, osteoarthritis, COPD, chronic kidney disease.

 

PAST SURGICAL HISTORY:  She has had back surgery, cholecystectomy, knee surgery, and 3 shoulder surge
kristie.

 

ALLERGIES:  PENICILLIN.

 

FAMILY HISTORY:  No known heritable diseases.

 

CURRENT MEDICATIONS:  As taken from the emergency room records as she says these were given to the [facility] [place].  These include amlodipine/valsartan 5/320 one daily, citalopram 10 mg daily, metformin 500 mg supa pires, gabapentin 300 mg t.i.d., hydralazine 75 mg t.i.d., carvedilol 12.5 mg twice a day, Lasix 20 mg 
daily, aspirin 81 mg daily, etodolac 500 mg twice a day.

 

PHYSICAL EXAMINATION:

GENERAL:  She is alert and oriented.  She appears to be in no acute distress.

VITAL SIGNS:  Blood pressure was 179/90, heart rate 80, respiratory rate of 24, temperature is 99.3.

HEENT:  Pupils are equal, round, and reactive.  Extraocular muscles are intact.  Her sclerae are anic
teric.  Throat; no erythema, no exudates.

NECK:  No adenopathy, no bruits.

LUNGS:  She has some scattered mild expiratory wheezing.  She did have some rales in the left base wi
th some rhonchi.

CARDIOVASCULAR:  She has a normal S1 and S2.  I did appreciate 2/6 systolic murmur around the base.

ABDOMEN:  Obese, it is soft.  There is some mild diffuse tenderness.  There is no rebound, no guardin
g.  Positive for bowel sounds.

EXTREMITIES:  There is some trace nonpitting edema.

NEUROLOGIC:  Cranial nerves II-XII are grossly intact.  Her muscle strength is 5/5 in both her upper 
and lower extremities.

SKIN AND INTEGUMENT:  There are no significant skin lesions or rash.

 

SIGNIFICANT LABORATORY AND X-RAY FINDINGS:  White blood cell count 13.9, hemoglobin 11.5, hematocrit 
36.1, platelet count is 250.  Sodium 139, potassium 3.5, chloride is 100, CO2 is 29, BUN 12, creatini
ne 0.78, glucose is 195.  Troponin is 0.026, natriuretic peptide was 225.7.  The patient had a chest 
x-ray showing mild worsening of pulmonary edema and CT scan findings are as previously mentioned and 
include a persistent right lower lobe infiltrate or airspace disease, some of which were concerning f
or possible adenocarcinoma.

 

ASSESSMENT AND PLAN:

1.  This is a very pleasant 89-year-old female who is being admitted for a transient episode of epiga
stric pain as well as shortness of breath.  This appears to have actually resolved in the ER after th
e removal of the fentanyl patches.  Some of her symptoms could be the result of an accidental opiate 
overmedication with the fentanyl; however, she does have some volume overload radiographically.  She 
will be admitted to the medical floor.  We will give her one dose of IV Lasix to help with the volume
 overload and then likely this could be transitioned back to her regular oral dose tomorrow.  We will
 treat her for pneumonia given the findings on the CT scan and will also consult her pulmonologist Dr Nicole Christina for further recommendations.

2.  With regards to diabetes mellitus, we will actually hold metformin for now until we get further r
ecommendations from Dr. Christina and cover her with a sliding scale insulin.

3.  Chronic obstructive pulmonary disease.  We will continue DuoNebs both scheduled and p.r.n.  She w
ill also be placed on deep venous thrombosis and gastrointestinal prophylaxis.

## 2018-02-01 NOTE — RAD
CHEST ONE VIEW: 

 

History: Dyspnea, difficulty breathing. 

 

Comparison: 1-28-18

 

FINDINGS: 

Right reverse total shoulder arthroplasty. Moderate pulmonary edema as well as cephalization of pulmo
nary vasculature. Small effusions. No pneumothorax. 

 

IMPRESSION: 

Mildly worsening pulmonary edema. 

 

POS: OFF

## 2018-02-01 NOTE — CT
CT OF THE ABDOMEN AND PELVIS WITH IV CONTRAST:

 

Date:  02/01/18 

 

INDICATION:

Right-sided abdominal pain. 

 

COMPARISON:  

CT of the abdomen and pelvis dated 07/12/12 with a CT of the chest dated 12/15/12 and 12/07/16. 

 

FINDINGS:

 

There is an area of persistent peripheral consolidation within the right lower lobe which has increas
ed in size from the 2016, now measuring 4.2 cm. There is a calcified granuloma in the left lower lobe
. There is some subsegmental volume loss within both lower lobes. 

 

There is a small hiatal hernia. 

 

Gallbladder is surgically absent. No focal hepatic lesion is evident. There are stable bilateral qing
l cysts. There is a calcified granuloma in the spleen. No drainable fluid collection is evident. No d
efinite acute osseous abnormality is noted. 

 

IMPRESSION: 

1.  Persistent right lower lobe peripheral air space consolidation. Findings are concerning for adeno
carcinoma of the lung versus a chronic area of pulmonary consolidation, possibly related to cryptogen
ic organizing pneumonia or chronic eosinophilic pneumonia. Chronic pulmonary infarct cannot be entire
ly excluded. Would recommend pulmonary consultation and a complete CT of the thorax with IV contrast 
for additional evaluation for additional pulmonary lesions. 

 

2.  No definite acute abnormality seen within the abdomen or pelvis. 

 

3.  Stable bilateral renal cysts and postsurgical change of prior cholecystectomy. Uterus is surgical
ly absent. There is postsurgical change of a small bowel resection with anastomosis in the right lowe
r quadrant of the abdomen. 

 

4.  Other chronic findings as above. 

 

CODE T. 

 

 

POS: [facility]

## 2018-02-01 NOTE — CON
DATE OF CONSULTATION:  02/01/2018

 

CONSULTING PHYSICIAN:  Dr. Pathak from [facility].

 

REASON FOR CONSULTATION:  Lung mass and pneumonia.

 

HISTORY OF PRESENT ILLNESS:  History is obtained from speaking with the patient
, her family, reviewing notes in the chart.

 

She is an 89-year-old female who just got discharged from the hospital 2 days 
ago.  She came in today complaining of increasing shortness of breath and 
weakness.  One of her family members told me that they found that they had 
inadvertently put 2 fentanyl patches on her as soon as they took the patch is 
off.  Her breathing became better.  The patient has been coughing since her 
last hospitalization.  Discharge summary indicates at that time, she was being 
treated for altered mental status, but did not mention the possibility of 
pneumonia.  During this emergency room visit, she underwent a CT scan of the 
abdomen, which was called a right lower lobe pneumonia; however, in reviewing 
films from 12/2016, she had an infiltrate in the exact same region compared to 
that.  In fact, the size has not changed very much.  The patient denies any 
fever or chills.

 

PAST MEDICAL HISTORY:

1.  COPD - followed by Dr. Christina.

2.  Diabetes mellitus.

3.  Hyperlipidemia.

4.  Hypertension.

5.  Pulmonary embolism.

6.  Anxiety.

 

PAST SURGICAL HISTORY:  Cholecystectomy, hysterectomy, rotator cuff repair, 
arthroplasty, cataract surgery, small bowel removal.

 

MEDICATIONS:  Prior to admission, amlodipine 5 mg daily, valsartan 320 mg daily
, citalopram 10 mg daily, metformin 500 mg daily, gabapentin 300 mg 3 times 
daily, carvedilol 12.5 mg twice daily, hydralazine 75 mg 3 times daily,  Lasix 
20 mg daily, aspirin 81 mg daily, etodolac 500 mg daily.  Additionally, she is 
on some type of Duragesic patch.

 

ALLERGIES:  PENICILLINS.

 

SOCIAL HISTORY:  Nonsmoker, does not consume alcohol.

 

REVIEW OF SYSTEMS:  Twelve point review of systems otherwise negative.

 

PHYSICAL EXAMINATION:

VITAL SIGNS:  Temperature 98.4, pulse 65, respirations 18, O2 sat 97% on 2 
liters, blood pressure 172/67.

GENERAL:  She is awake and alert and in no acute distress.

HEENT:  Unremarkable.

NECK:  Without adenopathy or JVD.

LUNGS:  She has some coarse crackles bilaterally.  No wheezes.

CARDIAC:  S1, S2 regular, without murmur.

ABDOMEN:  Soft, nontender.

EXTREMITIES:  No clubbing, cyanosis, or edema.

NEUROLOGIC:  Grossly intact throughout.

SKIN:  Shows no lesions.

PSYCHIATRIC:  Alert and oriented x3.

 

LABORATORY DATA:  White blood cell count 13.9, hematocrit 36.1, platelet count 
250.  Sodium 139, potassium 3.5, chloride 100, CO2 of 29, BUN 12, creatinine 0.7
, glucose 194.  I personally reviewed her current CT of the abdomen as well as 
a CT of the chest from 2016.  Her chest x-ray from today shows some blunting of 
costophrenic angles, but overall does not look bad.

 

ASSESSMENT:

1.  Aforementioned area in the right lower lobe is probably chronic scarring in 
nature.  I doubt that this is malignancy.

2.  Question of concurrent underlying pneumonia.  More likely, her respiratory 
status is also due to excess Duragesic in her system.

3.  Underlying chronic obstructive pulmonary disease.

 

RECOMMENDATIONS:  I would consolidate her antibiotics rapidly.  Continue 
nebulization treatments.  I will have Dr. Christina look at the case tomorrow.  
Currently, I would not favor doing any type of biopsy.



70 min spent on patient at the bedside and/or on the patient's floor

 

[facility]

## 2018-02-02 LAB
ANION GAP SERPL CALC-SCNC: 12 MMOL/L (ref 10–20)
BUN SERPL-MCNC: 20 MG/DL (ref 9.8–20.1)
CALCIUM SERPL-MCNC: 8.8 MG/DL (ref 7.8–10.44)
CHLORIDE SERPL-SCNC: 97 MMOL/L (ref 98–107)
CO2 SERPL-SCNC: 27 MMOL/L (ref 23–31)
CREAT CL PREDICTED SERPL C-G-VRATE: 36 ML/MIN (ref 70–130)
GLUCOSE SERPL-MCNC: 288 MG/DL (ref 83–110)
HGB BLD-MCNC: 9.4 G/DL (ref 12–16)
MCH RBC QN AUTO: 29.9 PG (ref 27–31)
MCV RBC AUTO: 95.5 FL (ref 81–99)
MDIFF COMPLETE?: YES
PLATELET # BLD AUTO: 235 THOU/UL (ref 130–400)
POTASSIUM SERPL-SCNC: 4.2 MMOL/L (ref 3.5–5.1)
RBC # BLD AUTO: 3.14 MILL/UL (ref 4.2–5.4)
SODIUM SERPL-SCNC: 132 MMOL/L (ref 136–145)
WBC # BLD AUTO: 19.4 THOU/UL (ref 4.8–10.8)

## 2018-02-02 RX ADMIN — ASPIRIN SCH MG: 81 TABLET ORAL at 08:32

## 2018-02-02 RX ADMIN — INSULIN HUMAN SCH UNIT: 100 INJECTION, SUSPENSION SUBCUTANEOUS at 10:26

## 2018-02-02 RX ADMIN — Medication SCH ML: at 10:26

## 2018-02-02 RX ADMIN — INSULIN LISPRO PRN UNIT: 100 INJECTION, SOLUTION INTRAVENOUS; SUBCUTANEOUS at 12:18

## 2018-02-02 RX ADMIN — INSULIN LISPRO PRN UNIT: 100 INJECTION, SOLUTION INTRAVENOUS; SUBCUTANEOUS at 05:42

## 2018-02-02 RX ADMIN — Medication SCH ML: at 21:33

## 2018-02-02 RX ADMIN — INSULIN HUMAN SCH UNIT: 100 INJECTION, SUSPENSION SUBCUTANEOUS at 21:27

## 2018-02-02 NOTE — PDOC.PN
- Subjective


Encounter Start Date: 02/02/18


Encounter Start Time: 10:30


-: old records requested/rev





Patient seen and examined. No new complaints. No overnight events


has cough, has dyspnea





- Objective


Resuscitation Status: 


 











Resuscitation Status           DNR:Do Not Resuscitate














MAR Reviewed: Yes


Vital Signs & Weight: 


 Vital Signs (12 hours)











  Temp Pulse Resp BP BP BP BP


 


 02/02/18 10:25   64   137/68   


 


 02/02/18 09:50      120/66  138/64 


 


 02/02/18 08:33   68     


 


 02/02/18 06:56   68  14    


 


 02/02/18 06:52  98.2 F  68  16     119/58 L


 


 02/02/18 05:00  98.5 F  60  20     163/65 H


 


 02/02/18 00:47   61  14    


 


 02/02/18 00:28  98.3 F  60  19     130/64














  Pulse Ox Pulse Ox Pulse Ox


 


 02/02/18 10:25   


 


 02/02/18 09:50   100  100


 


 02/02/18 08:33   


 


 02/02/18 06:56   


 


 02/02/18 06:52  99  


 


 02/02/18 05:00  99  


 


 02/02/18 00:47  98  


 


 02/02/18 00:28  99  








 Weight











Admit Weight                   163 lb 1 oz


 


Weight                         163 lb 1 oz














I&O: 


 











 02/01/18 02/02/18 02/03/18





 06:59 06:59 06:59


 


Intake Total  1150 


 


Balance  1150 











Result Diagrams: 


 02/02/18 04:21





 02/02/18 04:21


Additional Labs: 


 Accuchecks











  02/02/18 02/01/18 02/01/18





  05:40 20:46 15:55


 


POC Glucose  263 H  337 H  287 H











Radiology Reviewed by me: Yes (Chest xray and CT abdomen)





Phys Exam





- Physical Examination


Constitutional: NAD


HEENT: PERRLA, moist MMs, sclera anicteric


Neck: no JVD, supple


Respiratory: no wheezing, no rhonchi


right base rales


Cardiovascular: RRR, no significant murmur, no rub


Gastrointestinal: soft, non-tender, no distention, positive bowel sounds


Musculoskeletal: no edema, pulses present


Neurological: non-focal, normal sensation


Lymphatic: no nodes


Psychiatric: normal affect, A&O x 3


Skin: no rash, normal turgor





Dx/Plan


(1) Right lower lobe pneumonia


Code(s): J18.1 - LOBAR PNEUMONIA, UNSPECIFIED ORGANISM   Status: Acute   


Qualifiers: 


   Aspiration pneumonia type: unspecified 





(2) Anemia, normocytic normochromic


Code(s): D64.9 - ANEMIA, UNSPECIFIED   Status: Chronic   





(3) Anxiety and depression


Code(s): F41.9 - ANXIETY DISORDER, UNSPECIFIED; F32.9 - MAJOR DEPRESSIVE 

DISORDER, SINGLE EPISODE, UNSPECIFIED   Status: Chronic   





(4) CKD (chronic kidney disease) stage 3, GFR 30-59 ml/min


Code(s): N18.3 - CHRONIC KIDNEY DISEASE, STAGE 3 (MODERATE)   Status: Chronic   





(5) COPD (chronic obstructive pulmonary disease)


Status: Chronic   





(6) DM type 2 (diabetes mellitus, type 2)


Status: Chronic   


Qualifiers: 


 





(7) HTN (hypertension)


Code(s): I10 - ESSENTIAL (PRIMARY) HYPERTENSION   Status: Chronic   


Qualifiers: 


 





(8) Osteoarthritis


Code(s): M19.90 - UNSPECIFIED OSTEOARTHRITIS, UNSPECIFIED SITE   Status: 

Chronic   





(9) Physical deconditioning


Code(s): R53.81 - OTHER MALAISE   Status: Chronic   





(10) Pulmonary hypertension


Code(s): I27.2 - OTHER SECONDARY PULMONARY HYPERTENSION * DO NOT USE *   Status

: Chronic   





- Plan


cont current plan of care, continue antibiotics, respiratory therapy





* currently on levaquin and vancomycin


* pulmonary consulted


* continue respiratory therapy


* continue selected home meds


* medication reviewed as below 


* symptomatic treatment


* will repeat labs tomorrow.








Review of Systems





- Review of Systems


Constitutional: weakness.  negative: fever, chills, sweats, malaise, other


Respiratory: Cough, Shortness of Breath.  negative: Dry, Hemoptysis, SOB with 

Excertion, Pleuritic Pain, Sputum, Wheezing


Cardiovascular: negative: chest pain, palpitations, orthopnea, paroxysmal 

nocturnal dyspnea, edema, light headedness, other


Gastrointestinal: negative: Nausea, Vomiting, Abdominal Pain, Diarrhea, 

Constipation, Melena, Hematochezia, Other


Genitourinary: negative: Dysuria, Frequency, Incontinence, Hematuria, Retention

, Other


Musculoskeletal: negative: Neck Pain, Shoulder Pain, Arm Pain, Back Pain, Hand 

Pain, Leg Pain, Foot Pain, Other


Skin: negative: Rash, Lesions, Dennis, Bruising, Other





- Medications/Allergies


Allergies/Adverse Reactions: 


 Allergies











Allergy/AdvReac Type Severity Reaction Status Date / Time


 


Penicillins Allergy   Verified 01/26/18 23:06











Medications: 


 Current Medications





Acetaminophen (Tylenol)  650 mg PO Q4H PRN


   PRN Reason: Headache/Fever or Pain


   Last Admin: 02/02/18 01:00 Dose:  650 mg


Hydrocodone Bitart/Acetaminophen (Norco 5/325)  1 tab PO Q4H PRN


   PRN Reason: Moderate Pain (4-6)


   Last Admin: 02/01/18 17:42 Dose:  1 tab


Hydrocodone Bitart/Acetaminophen (Norco 10/325)  1 tab PO Q4H PRN


   PRN Reason: Pain


Al Hydroxide/Mg Hydroxide (Maalox)  15 ml PO Q4H PRN


   PRN Reason: Heartburn  or Indigestion


Albuterol Sulfate (Ventolin)  5 mg NEB Q4H PRN


   PRN Reason: SOB &/or Wheezing


Albuterol/Ipratropium (Duoneb)  3 ml NEB [id number] [facility]


   Last Admin: 02/02/18 06:56 Dose:  3 ml


Albuterol/Ipratropium (Duoneb)  3 ml NEB [id number] PRN


   PRN Reason: SOB &/or Wheezing


Amlodipine Besylate (Norvasc)  5 mg PO DAILY [facility]


   Last Admin: 02/02/18 08:33 Dose:  5 mg


Artificial Tears (Tears Naturale)  0 drop EA EYE PRN PRN


   PRN Reason: Dry Eyes


Aspirin (Ecotrin)  81 mg PO DAILY [facility]


   Last Admin: 02/02/18 08:32 Dose:  81 mg


Carvedilol (Coreg)  12.5 mg PO BID [facility]


   Last Admin: 02/02/18 08:44 Dose:  12.5 mg


Citalopram Hydrobromide (Celexa)  10 mg PO DAILY [facility]


   Last Admin: 02/02/18 08:39 Dose:  10 mg


Dextrose/Water (Dextrose 50%)  25 gm SLOW IVP PRN PRN


   PRN Reason: Hypoglycemia


Docusate Sodium (Colace)  100 mg PO BID [facility]


   Last Admin: 02/02/18 08:32 Dose:  100 mg


Enoxaparin Sodium (Lovenox)  30 mg SC 0900 [facility]


   Last Admin: 02/02/18 08:43 Dose:  30 mg


Famotidine (Pepcid)  20 mg PO BID [facility]


   Last Admin: 02/02/18 08:39 Dose:  20 mg


Fentanyl (Duragesic)  50 mcg TD Q2DAYS [facility]


   Last Admin: 02/02/18 10:22 Dose:  50 mcg


Gabapentin (Neurontin)  300 mg PO BID [facility]


   Last Admin: 02/02/18 08:39 Dose:  300 mg


Glucagon (Glucagon)  1 mg IM PRN PRN


   PRN Reason: Hypoglycemia


Guaifenesin (Robitussin Sf)  200 mg PO Q4H PRN


   PRN Reason: Cough


Hydralazine HCl (Apresoline)  10 mg SLOW IVP Q4H PRN


   PRN Reason: Systolic BP > 180


Hydralazine HCl (Apresoline)  75 mg PO TID [facility]


   Last Admin: 02/02/18 10:25 Dose:  75 mg


Dextrose/Water (D5w)  1,000 mls @ 0 mls/hr IV .Q0M PRN; As Directed


   PRN Reason: Hypoglycemia


Insulin Human Isoph/Insulin Regular (Humulin 70/30)  5 units SC [facility]


Insulin Human Isoph/Insulin Regular (Humulin 70/30)  10 units SC QAM [facility]


   Last Admin: 02/02/18 10:26 Dose:  10 unit


Insulin Human Lispro (Humalog)  0 units SC .MODERATE SLIDING SC PRN


   PRN Reason: Moderate Correctional Scale


   Last Admin: 02/02/18 05:42 Dose:  6 unit


Insulin Human Lispro (Humalog)  0 units SC .BEDTIME SLIDING SC PRN


   PRN Reason: Bedtime Correctional Scale


   Last Admin: 02/01/18 20:48 Dose:  4 unit


Levofloxacin (Levaquin)  500 mg PO 0600 [facility]


Loperamide HCl (Imodium)  2 mg PO PRN PRN


   PRN Reason: Diarrhea/Loose Stools


Loratadine (Claritin)  10 mg PO DAILYPRN PRN


   PRN Reason: Sinus Symptoms


Magnesium Hydroxide (Milk Of Magnesium)  30 ml PO DAILYPRN PRN


   PRN Reason: Constipation


Metformin HCl (Glucophage Xr)  500 mg PO 1700 [facility]


Mineral Oil/White Petrolatum (Eucerin Cream)  0 gm TOP BIDPRN PRN


   PRN Reason: Dry Skin


Ondansetron HCl (Zofran Odt)  4 mg PO Q6H PRN


   PRN Reason: Nausea/Vomiting


Ondansetron HCl (Zofran)  4 mg IVP Q6H PRN


   PRN Reason: Nausea/Vomiting


[Etodolac] 500 Mg  0 each PO BID [facility]


Phenol (Chloraseptic Spray 180 Ml Bot)  0 ml PO PRN PRN


   PRN Reason: Sore Throat


Prednisone (Prednisone)  20 mg PO QAM-[facility]


Rosuvastatin Calcium (Crestor)  10 mg PO HS [facility]


   Last Admin: 02/01/18 20:47 Dose:  10 mg


Sodium Chloride (Flush - Normal Saline)  10 ml IVF Q12HR [facility]


   Last Admin: 02/02/18 10:26 Dose:  10 ml


Sodium Chloride (Flush - Normal Saline)  10 ml IVF PRN PRN


   PRN Reason: Saline Flush


Sodium Chloride (Ocean Nasal Spray 0.65%)  0 ml EA NARE QIDPRN PRN


   PRN Reason: Nasal Congestion


Valsartan (Diovan)  320 mg PO DAILY [facility]


   Last Admin: 02/02/18 08:32 Dose:  320 mg

## 2018-02-02 NOTE — PRG
DATE OF SERVICE:  02/02/2018

 

She is an 89-year-old female who is a DNR was admitted last night with shortness of breath, confusion
.  

 

PHYSICAL EXAMINATION: 

VITAL SIGNS:  Sats are 90% on 2 liters, temperature 98, blood pressure 109/58, respirations 18.  GENE
RAL:  She is awake, alert, responsive.

CHEST:  Chest reveals bilateral crackles.  There is no wheezing.

CARDIAC:  Normal S1, S2.  

ABDOMEN:  Soft, no masses.

 

LABORATORY:  Glucose 263.  Sodium 132, creatinine 1.23.  White count 19,000.  Chest x-ray taken yeste
rday, it is noted the report shows bilateral increased interstitial marking, slightly more pronounced
 in the left base.  Clearly, there is no mass. It is unclear whether a CT of the abdomen was ordered,
 but the lower half of the lung on the CT abdomen shows no masses that I can see.

 

IMPRESSION:

1.  Respiratory failure.

2.  Former smoker.  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

 

PLAN:  Continue present neb treatments, deescalate antibiotics, steroids.  I see no evidence of _____
 staph.  Hopefully, she can be discharged home in the next several days.

## 2018-02-03 LAB
ALBUMIN SERPL BCG-MCNC: 2.7 G/DL (ref 3.4–4.8)
ALP SERPL-CCNC: 52 U/L (ref 40–150)
ALT SERPL W P-5'-P-CCNC: 7 U/L (ref 8–55)
ANION GAP SERPL CALC-SCNC: 15 MMOL/L (ref 10–20)
AST SERPL-CCNC: 16 U/L (ref 5–34)
BASOPHILS # BLD AUTO: 0 THOU/UL (ref 0–0.2)
BASOPHILS NFR BLD AUTO: 0.1 % (ref 0–1)
BILIRUB SERPL-MCNC: 0.3 MG/DL (ref 0.2–1.2)
BUN SERPL-MCNC: 33 MG/DL (ref 9.8–20.1)
CALCIUM SERPL-MCNC: 8.8 MG/DL (ref 7.8–10.44)
CHLORIDE SERPL-SCNC: 99 MMOL/L (ref 98–107)
CO2 SERPL-SCNC: 26 MMOL/L (ref 23–31)
CREAT CL PREDICTED SERPL C-G-VRATE: 33 ML/MIN (ref 70–130)
EOSINOPHIL # BLD AUTO: 0.3 THOU/UL (ref 0–0.7)
EOSINOPHIL NFR BLD AUTO: 2.5 % (ref 0–10)
GLOBULIN SER CALC-MCNC: 3.9 G/DL (ref 2.4–3.5)
GLUCOSE SERPL-MCNC: 115 MG/DL (ref 83–110)
HGB BLD-MCNC: 9.1 G/DL (ref 12–16)
LYMPHOCYTES # BLD: 2.4 THOU/UL (ref 1.2–3.4)
LYMPHOCYTES NFR BLD AUTO: 18.3 % (ref 21–51)
MCH RBC QN AUTO: 31.4 PG (ref 27–31)
MCV RBC AUTO: 96.3 FL (ref 81–99)
MONOCYTES # BLD AUTO: 0.6 THOU/UL (ref 0.11–0.59)
MONOCYTES NFR BLD AUTO: 4.6 % (ref 0–10)
NEUTROPHILS # BLD AUTO: 9.7 THOU/UL (ref 1.4–6.5)
NEUTROPHILS NFR BLD AUTO: 74.6 % (ref 42–75)
PLATELET # BLD AUTO: 267 THOU/UL (ref 130–400)
POTASSIUM SERPL-SCNC: 4 MMOL/L (ref 3.5–5.1)
RBC # BLD AUTO: 2.9 MILL/UL (ref 4.2–5.4)
SODIUM SERPL-SCNC: 136 MMOL/L (ref 136–145)
WBC # BLD AUTO: 13 THOU/UL (ref 4.8–10.8)

## 2018-02-03 RX ADMIN — HYDROCODONE BITARTRATE AND ACETAMINOPHEN PRN TAB: 5; 325 TABLET ORAL at 12:16

## 2018-02-03 RX ADMIN — Medication SCH ML: at 08:22

## 2018-02-03 RX ADMIN — INSULIN LISPRO PRN UNIT: 100 INJECTION, SOLUTION INTRAVENOUS; SUBCUTANEOUS at 20:33

## 2018-02-03 RX ADMIN — Medication SCH ML: at 20:30

## 2018-02-03 RX ADMIN — INSULIN HUMAN SCH UNIT: 100 INJECTION, SUSPENSION SUBCUTANEOUS at 20:31

## 2018-02-03 RX ADMIN — INSULIN LISPRO PRN UNIT: 100 INJECTION, SOLUTION INTRAVENOUS; SUBCUTANEOUS at 17:10

## 2018-02-03 RX ADMIN — INSULIN HUMAN SCH UNIT: 100 INJECTION, SUSPENSION SUBCUTANEOUS at 08:14

## 2018-02-03 RX ADMIN — ASPIRIN SCH MG: 81 TABLET ORAL at 08:01

## 2018-02-03 NOTE — PDOC.PN
- Subjective


Encounter Start Date: 02/03/18


Encounter Start Time: 09:10





Patient seen and examined. No new complaints. No overnight events





- Objective


Resuscitation Status: 


 











Resuscitation Status           DNR:Do Not Resuscitate














MAR Reviewed: Yes


Vital Signs & Weight: 


 Vital Signs (12 hours)











  Temp Pulse Resp BP BP Pulse Ox


 


 02/03/18 08:06  98.2 F  65  16   155/70 H  98


 


 02/03/18 08:02   62   155/70 H  


 


 02/03/18 08:00  98.2 F  62  18   


 


 02/03/18 07:51   65  15    98








 Weight











Admit Weight                   163 lb 1 oz


 


Weight                         163 lb 1 oz














I&O: 


 











 02/02/18 02/03/18 02/04/18





 06:59 06:59 06:59


 


Intake Total 1150 975 


 


Balance 1150 975 











Result Diagrams: 


 02/03/18 04:19





 02/03/18 04:19


Additional Labs: 


 Accuchecks











  02/03/18 02/03/18 02/02/18





  11:41 05:53 20:44


 


POC Glucose  178 H  133 H  181 H














  02/02/18





  16:50


 


POC Glucose  183 H














Phys Exam





- Physical Examination


Constitutional: NAD


HEENT: PERRLA, moist MMs, sclera anicteric


Neck: no JVD, supple


Respiratory: no wheezing, no rhonchi


right base rales


Cardiovascular: RRR, no significant murmur, no rub


Gastrointestinal: soft, non-tender, no distention, positive bowel sounds


Musculoskeletal: no edema, pulses present


Neurological: non-focal, normal sensation, moves all 4 limbs


Psychiatric: normal affect, A&O x 3


Skin: no rash, normal turgor





Dx/Plan


(1) Right lower lobe pneumonia


Code(s): J18.1 - LOBAR PNEUMONIA, UNSPECIFIED ORGANISM   Status: Acute   


Qualifiers: 


   Aspiration pneumonia type: unspecified 





(2) Anemia, normocytic normochromic


Code(s): D64.9 - ANEMIA, UNSPECIFIED   Status: Chronic   





(3) Anxiety and depression


Code(s): F41.9 - ANXIETY DISORDER, UNSPECIFIED; F32.9 - MAJOR DEPRESSIVE 

DISORDER, SINGLE EPISODE, UNSPECIFIED   Status: Chronic   





(4) CKD (chronic kidney disease) stage 3, GFR 30-59 ml/min


Code(s): N18.3 - CHRONIC KIDNEY DISEASE, STAGE 3 (MODERATE)   Status: Chronic   





(5) COPD (chronic obstructive pulmonary disease)


Status: Chronic   





(6) DM type 2 (diabetes mellitus, type 2)


Status: Chronic   


Qualifiers: 


 





(7) HTN (hypertension)


Code(s): I10 - ESSENTIAL (PRIMARY) HYPERTENSION   Status: Chronic   


Qualifiers: 


 





(8) Osteoarthritis


Code(s): M19.90 - UNSPECIFIED OSTEOARTHRITIS, UNSPECIFIED SITE   Status: 

Chronic   





(9) Physical deconditioning


Code(s): R53.81 - OTHER MALAISE   Status: Chronic   





(10) Pulmonary hypertension


Code(s): I27.2 - OTHER SECONDARY PULMONARY HYPERTENSION * DO NOT USE *   Status

: Chronic   





- Plan


cont current plan of care, continue antibiotics





* pt wants to go to rehab


* medication reviewed as below


* symptomatic treatment


* continue levaquin.








Review of Systems





- Review of Systems


Constitutional: weakness.  negative: fever, chills, sweats, malaise, other


ENT: negative: Ear Pain, Ear Discharge, Nose Pain, Nose Discharge, Nose 

Congestion, Mouth Pain, Mouth Swelling, Throat Pain, Throat Swelling, Other


Respiratory: negative: Cough, Dry, Shortness of Breath, Hemoptysis, SOB with 

Excertion, Pleuritic Pain, Sputum, Wheezing


Cardiovascular: negative: chest pain, palpitations, orthopnea, paroxysmal 

nocturnal dyspnea, edema, light headedness, other


Gastrointestinal: negative: Nausea, Vomiting, Abdominal Pain, Diarrhea, 

Constipation, Melena, Hematochezia, Other


Genitourinary: negative: Dysuria, Frequency, Incontinence, Hematuria, Retention

, Other


Musculoskeletal: negative: Neck Pain, Shoulder Pain, Arm Pain, Back Pain, Hand 

Pain, Leg Pain, Foot Pain, Other





- Medications/Allergies


Allergies/Adverse Reactions: 


 Allergies











Allergy/AdvReac Type Severity Reaction Status Date / Time


 


Penicillins Allergy   Verified 01/26/18 23:06











Medications: 


 Current Medications





Acetaminophen (Tylenol)  650 mg PO Q4H PRN


   PRN Reason: Headache/Fever or Pain


   Last Admin: 02/02/18 21:29 Dose:  650 mg


Hydrocodone Bitart/Acetaminophen (Norco 5/325)  1 tab PO Q4H PRN


   PRN Reason: Moderate Pain (4-6)


   Last Admin: 02/03/18 12:16 Dose:  1 tab


Hydrocodone Bitart/Acetaminophen (Norco 10/325)  1 tab PO Q4H PRN


   PRN Reason: Pain


Al Hydroxide/Mg Hydroxide (Maalox)  15 ml PO Q4H PRN


   PRN Reason: Heartburn  or Indigestion


Albuterol Sulfate (Ventolin)  5 mg NEB Q4H PRN


   PRN Reason: SOB &/or Wheezing


Albuterol/Ipratropium (Duoneb)  3 ml NEB [id number] [facility]


   Last Admin: 02/03/18 07:51 Dose:  3 ml


Albuterol/Ipratropium (Duoneb)  3 ml NEB [id number] PRN


   PRN Reason: SOB &/or Wheezing


Amlodipine Besylate (Norvasc)  5 mg PO DAILY [facility]


   Last Admin: 02/03/18 08:02 Dose:  5 mg


Artificial Tears (Tears Naturale)  0 drop EA EYE PRN PRN


   PRN Reason: Dry Eyes


Aspirin (Ecotrin)  81 mg PO DAILY [facility]


   Last Admin: 02/03/18 08:01 Dose:  81 mg


Carvedilol (Coreg)  12.5 mg PO BID [facility]


   Last Admin: 02/03/18 08:02 Dose:  12.5 mg


Citalopram Hydrobromide (Celexa)  10 mg PO DAILY [facility]


   Last Admin: 02/03/18 08:02 Dose:  10 mg


Dextrose/Water (Dextrose 50%)  25 gm SLOW IVP PRN PRN


   PRN Reason: Hypoglycemia


Docusate Sodium (Colace)  100 mg PO BID [facility]


   Last Admin: 02/03/18 08:01 Dose:  100 mg


Enoxaparin Sodium (Lovenox)  30 mg SC 0900 [facility]


   Last Admin: 02/03/18 08:14 Dose:  30 mg


Famotidine (Pepcid)  20 mg PO BID [facility]


   Last Admin: 02/03/18 08:01 Dose:  20 mg


Fentanyl (Duragesic)  50 mcg TD Q2DAYS [facility]


   Last Admin: 02/02/18 10:22 Dose:  50 mcg


Gabapentin (Neurontin)  300 mg PO BID [facility]


   Last Admin: 02/03/18 08:01 Dose:  300 mg


Glucagon (Glucagon)  1 mg IM PRN PRN


   PRN Reason: Hypoglycemia


Guaifenesin (Robitussin Sf)  200 mg PO Q4H PRN


   PRN Reason: Cough


   Last Admin: 02/02/18 14:43 Dose:  200 mg


Hydralazine HCl (Apresoline)  10 mg SLOW IVP Q4H PRN


   PRN Reason: Systolic BP > 180


Hydralazine HCl (Apresoline)  75 mg PO TID [facility]


   Last Admin: 02/03/18 08:02 Dose:  75 mg


Dextrose/Water (D5w)  1,000 mls @ 0 mls/hr IV .Q0M PRN; As Directed


   PRN Reason: Hypoglycemia


Insulin Human Isoph/Insulin Regular (Humulin 70/30)  5 units SC [facility]


   Last Admin: 02/02/18 21:27 Dose:  5 unit


Insulin Human Isoph/Insulin Regular (Humulin 70/30)  [facility]


   Last Admin: 02/03/18 08:14 Dose:  10 unit


Insulin Human Lispro (Humalog)  0 units SC .MODERATE SLIDING SC PRN


   PRN Reason: Moderate Correctional Scale


   Last Admin: 02/02/18 12:18 Dose:  4 unit


Insulin Human Lispro (Humalog)  0 units SC .BEDTIME SLIDING SC PRN


   PRN Reason: Bedtime Correctional Scale


   Last Admin: 02/01/18 20:48 Dose:  4 unit


Levofloxacin (Levaquin)  500 mg PO 0600 [facility]


   Last Admin: 02/03/18 06:22 Dose:  500 mg


Loperamide HCl (Imodium)  2 mg PO PRN PRN


   PRN Reason: Diarrhea/Loose Stools


Loratadine (Claritin)  10 mg PO DAILYPRN PRN


   PRN Reason: Sinus Symptoms


Magnesium Hydroxide (Milk Of Magnesium)  30 ml PO DAILYPRN PRN


   PRN Reason: Constipation


   Last Admin: 02/02/18 14:43 Dose:  30 ml


Metformin HCl (Glucophage Xr)  500 mg PO 1700 [facility]


   Last Admin: 02/02/18 17:33 Dose:  500 mg


Mineral Oil/White Petrolatum (Eucerin Cream)  0 gm TOP BIDPRN PRN


   PRN Reason: Dry Skin


Ondansetron HCl (Zofran Odt)  4 mg PO Q6H PRN


   PRN Reason: Nausea/Vomiting


Ondansetron HCl (Zofran)  4 mg IVP Q6H PRN


   PRN Reason: Nausea/Vomiting


[Etodolac] 500 Mg  0 each PO BID [facility]


   Last Admin: 02/03/18 07:59 Dose:  Not Given


Phenol (Chloraseptic Spray 180 Ml Bot)  0 ml PO PRN PRN


   PRN Reason: Sore Throat


Prednisone (Prednisone)  20 mg PO QAM-[facility]


   Last Admin: 02/03/18 08:02 Dose:  20 mg


Rosuvastatin Calcium (Crestor)  10 mg PO HS [facility]


   Last Admin: 02/02/18 21:33 Dose:  10 mg


Sodium Chloride (Flush - Normal Saline)  10 ml IVF Q12HR [facility]


   Last Admin: 02/03/18 08:22 Dose:  10 ml


Sodium Chloride (Flush - Normal Saline)  10 ml IVF PRN PRN


   PRN Reason: Saline Flush


Sodium Chloride (Ocean Nasal Spray 0.65%)  0 ml EA NARE QIDPRN PRN


   PRN Reason: Nasal Congestion


Valsartan (Diovan)  320 mg PO DAILY [facility]


   Last Admin: 02/03/18 08:14 Dose:  320 mg

## 2018-02-03 NOTE — EKG
Test Reason : WEAKNESS

Blood Pressure : ***/*** mmHG

Vent. Rate : 065 BPM     Atrial Rate : 065 BPM

   P-R Int : 164 ms          QRS Dur : 078 ms

    QT Int : 424 ms       P-R-T Axes : 030 022 007 degrees

   QTc Int : 440 ms

 

Normal sinus rhythm with sinus arrhythmia

Nonspecific T wave abnormality

Abnormal ECG

 

Confirmed by MANDY CHAMBERS ([id number]),  ZACHERY MOCK ([id number]) on 2/3/2018 1:44:03 PM

 

Referred By:             Confirmed By:MANDY CHAMBERS

## 2018-02-03 NOTE — PRG
DATE OF SERVICE:  02/03/2018

 

SERVICE:  Pulmonary Medicine.

 

INTERVAL HISTORY:  The patient is doing really quite well from a respiratory 
standpoint.  She denies any current fevers or chills.  She is on a little bit 
of oxygen.  Otherwise, there has been no interval change to her condition.  She 
feels fairly comfortable today.  She is able to walk with physical therapy, but 
at one point became extraordinarily weak and had to rest abruptly.  Outside of 
that, she has been able to get around her room a little bit with some minimal 
assistance.

 

PHYSICAL EXAMINATION:

VITAL SIGNS:  Afebrile, pulse 65, blood pressure 155/70, respirations 16, and 
saturation 98% on 2 liters nasal cannula.

GENERAL:  Patient is awake, alert, in no apparent distress.

LUNGS:  Decreased air entry.  There is no prolonged expiratory phase.  
Dependent crackles are minimal.

HEART:  Normal rate, regular.

ABDOMEN:  Soft, nontender, nondistended.  Bowel sounds positive.

MUSCULOSKELETAL:  No cyanosis or clubbing.  There is trace to 1+ pitting in the 
bilateral lower extremities.

NEUROLOGIC:  Grossly nonfocal.

 

LABORATORY DATA:  WBC 13.0, hemoglobin 9.1, platelets 267,000.  Creatinine 1.37 
and gently up trending.  Basic metabolic profile and liver function studies are 
otherwise unremarkable.  Influenza A and B is negative.  Blood cultures x2 is 
unremarkable.

 

ASSESSMENT:

1.  Acute hypoxic respiratory failure.

2.  Acute on chronic diastolic heart failure.

3.  Minimal volume overload.

4.  Pulmonary infiltrate that dates back all the way of February 2014.

 

PLAN:  I give the patient dose of Lasix tomorrow morning.  From my perspective, 
she is stable for transition out of the hospital.  The question of what this 
infiltrate is in the recess of the right lower lobe is never been answered.  
That being said, it has been very slow to grow over a period of 4 years.  In 
this 89-year-old with limited functional status, I would argue that we really 
do not need to investigate this.  Pulmonary Critical Care will continue to 
follow while she remains in this location, but otherwise, supportive measures 
will be continue.

 

LAURENCE

## 2018-02-03 NOTE — EKG
Test Reason : ER INDICATION

Blood Pressure : ***/*** mmHG

Vent. Rate : 073 BPM     Atrial Rate : 073 BPM

   P-R Int : 152 ms          QRS Dur : 088 ms

    QT Int : 418 ms       P-R-T Axes : 044 013 036 degrees

   QTc Int : 460 ms

 

Normal sinus rhythm

Normal ECG

 

Confirmed by FRANCESCA MAHER D.O. ([id number]),  ZACHERY MOCK ([id number]) on 2/3/2018 5:34:43 PM

 

Referred By:             Confirmed By:FRANCESCA MAHER D.O.

## 2018-02-04 LAB
ANION GAP SERPL CALC-SCNC: 11 MMOL/L (ref 10–20)
BUN SERPL-MCNC: 29 MG/DL (ref 9.8–20.1)
CALCIUM SERPL-MCNC: 9.1 MG/DL (ref 7.8–10.44)
CHLORIDE SERPL-SCNC: 103 MMOL/L (ref 98–107)
CO2 SERPL-SCNC: 30 MMOL/L (ref 23–31)
CREAT CL PREDICTED SERPL C-G-VRATE: 52 ML/MIN (ref 70–130)
GLUCOSE SERPL-MCNC: 118 MG/DL (ref 83–110)
POTASSIUM SERPL-SCNC: 4.7 MMOL/L (ref 3.5–5.1)
SODIUM SERPL-SCNC: 139 MMOL/L (ref 136–145)

## 2018-02-04 RX ADMIN — INSULIN LISPRO PRN UNIT: 100 INJECTION, SOLUTION INTRAVENOUS; SUBCUTANEOUS at 20:17

## 2018-02-04 RX ADMIN — Medication SCH ML: at 20:17

## 2018-02-04 RX ADMIN — Medication SCH ML: at 08:09

## 2018-02-04 RX ADMIN — INSULIN HUMAN SCH UNIT: 100 INJECTION, SUSPENSION SUBCUTANEOUS at 07:54

## 2018-02-04 RX ADMIN — INSULIN HUMAN SCH UNIT: 100 INJECTION, SUSPENSION SUBCUTANEOUS at 20:16

## 2018-02-04 RX ADMIN — HYDROCODONE BITARTRATE AND ACETAMINOPHEN PRN TAB: 5; 325 TABLET ORAL at 20:22

## 2018-02-04 RX ADMIN — ASPIRIN SCH MG: 81 TABLET ORAL at 07:54

## 2018-02-04 RX ADMIN — HYDROCODONE BITARTRATE AND ACETAMINOPHEN PRN TAB: 5; 325 TABLET ORAL at 10:08

## 2018-02-04 NOTE — PDOC.PN
- Subjective


Encounter Start Date: 02/04/18


Encounter Start Time: 09:10





Patient seen and examined. No new complaints. No overnight events


she wants to go to rehab 





- Objective


Resuscitation Status: 


 











Resuscitation Status           DNR:Do Not Resuscitate














MAR Reviewed: Yes


Vital Signs & Weight: 


 Vital Signs (12 hours)











  Temp Pulse Resp BP BP BP Pulse Ox


 


 02/04/18 10:10      132/71  


 


 02/04/18 08:00  98.2 F  67  18    


 


 02/04/18 07:53   67   187/74 H   


 


 02/04/18 07:46  98.2 F  67  16    187/74 H  97


 


 02/04/18 06:47        98


 


 02/04/18 06:46   76  15     98


 


 02/04/18 01:30   70  16     97








 Weight











Admit Weight                   163 lb 1 oz


 


Weight                         163 lb 1 oz














I&O: 


 











 02/03/18 02/04/18 02/05/18





 06:59 06:59 06:59


 


Intake Total 975  


 


Balance 975  











Result Diagrams: 


 02/03/18 04:19





 02/04/18 04:57


Additional Labs: 


 Accuchecks











  02/04/18 02/03/18 02/03/18





  04:26 20:32 20:01


 


POC Glucose  148 H  274 H  286 H














  02/03/18 02/03/18





  16:49 11:41


 


POC Glucose  362 H  178 H














Phys Exam





- Physical Examination


Constitutional: NAD


HEENT: PERRLA, moist MMs, sclera anicteric


Neck: no JVD, supple


Respiratory: no wheezing, no rhonchi


right base rales


Cardiovascular: RRR, no significant murmur, no rub


Gastrointestinal: soft, non-tender, no distention, positive bowel sounds


Musculoskeletal: no edema, pulses present


Neurological: non-focal, normal sensation, moves all 4 limbs


Lymphatic: no nodes


Psychiatric: normal affect, A&O x 3


Skin: no rash, normal turgor





Dx/Plan


(1) Right lower lobe pneumonia


Code(s): J18.1 - LOBAR PNEUMONIA, UNSPECIFIED ORGANISM   Status: Acute   


Qualifiers: 


   Aspiration pneumonia type: unspecified 





(2) Anemia, normocytic normochromic


Code(s): D64.9 - ANEMIA, UNSPECIFIED   Status: Chronic   





(3) Anxiety and depression


Code(s): F41.9 - ANXIETY DISORDER, UNSPECIFIED; F32.9 - MAJOR DEPRESSIVE 

DISORDER, SINGLE EPISODE, UNSPECIFIED   Status: Chronic   





(4) CKD (chronic kidney disease) stage 3, GFR 30-59 ml/min


Code(s): N18.3 - CHRONIC KIDNEY DISEASE, STAGE 3 (MODERATE)   Status: Chronic   





(5) COPD (chronic obstructive pulmonary disease)


Status: Chronic   





(6) DM type 2 (diabetes mellitus, type 2)


Status: Chronic   


Qualifiers: 


 





(7) HTN (hypertension)


Code(s): I10 - ESSENTIAL (PRIMARY) HYPERTENSION   Status: Chronic   


Qualifiers: 


 





(8) Osteoarthritis


Code(s): M19.90 - UNSPECIFIED OSTEOARTHRITIS, UNSPECIFIED SITE   Status: 

Chronic   





(9) Physical deconditioning


Code(s): R53.81 - OTHER MALAISE   Status: Chronic   





(10) Pulmonary hypertension


Code(s): I27.2 - OTHER SECONDARY PULMONARY HYPERTENSION * DO NOT USE *   Status

: Chronic   





- Plan


cont current plan of care, continue antibiotics, PT/OT, 





* continue oral levaquin


* PT recommends rehab


* will consult rehab screening


* medication reviewed as below


* symptomatic treatment.


* lasix given


* will repeat labs tomorrow








Review of Systems





- Review of Systems


ENT: negative: Ear Pain, Ear Discharge, Nose Pain, Nose Discharge, Nose 

Congestion, Mouth Pain, Mouth Swelling, Throat Pain, Throat Swelling, Other


Respiratory: negative: Cough, Dry, Shortness of Breath, Hemoptysis, SOB with 

Excertion, Pleuritic Pain, Sputum, Wheezing


Cardiovascular: negative: chest pain, palpitations, orthopnea, paroxysmal 

nocturnal dyspnea, edema, light headedness, other


Gastrointestinal: negative: Nausea, Vomiting, Abdominal Pain, Diarrhea, 

Constipation, Melena, Hematochezia, Other


Genitourinary: negative: Dysuria, Frequency, Incontinence, Hematuria, Retention

, Other


Musculoskeletal: negative: Neck Pain, Shoulder Pain, Arm Pain, Back Pain, Hand 

Pain, Leg Pain, Foot Pain, Other


Skin: negative: Rash, Lesions, Dennis, Bruising, Other





- Medications/Allergies


Allergies/Adverse Reactions: 


 Allergies











Allergy/AdvReac Type Severity Reaction Status Date / Time


 


Penicillins Allergy   Verified 01/26/18 23:06











Medications: 


 Current Medications





Acetaminophen (Tylenol)  650 mg PO Q4H PRN


   PRN Reason: Headache/Fever or Pain


   Last Admin: 02/03/18 21:03 Dose:  650 mg


Hydrocodone Bitart/Acetaminophen (Norco 5/325)  1 tab PO Q4H PRN


   PRN Reason: Moderate Pain (4-6)


   Last Admin: 02/04/18 10:08 Dose:  1 tab


Hydrocodone Bitart/Acetaminophen (Norco 10/325)  1 tab PO Q4H PRN


   PRN Reason: Pain


Al Hydroxide/Mg Hydroxide (Maalox)  15 ml PO Q4H PRN


   PRN Reason: Heartburn  or Indigestion


Albuterol Sulfate (Ventolin)  5 mg NEB Q4H PRN


   PRN Reason: SOB &/or Wheezing


Albuterol/Ipratropium (Duoneb)  3 ml NEB [id number] [facility]


   Last Admin: 02/04/18 06:46 Dose:  3 ml


Albuterol/Ipratropium (Duoneb)  3 ml NEB [id number] PRN


   PRN Reason: SOB &/or Wheezing


Amlodipine Besylate (Norvasc)  5 mg PO DAILY [facility]


   Last Admin: 02/04/18 07:53 Dose:  5 mg


Artificial Tears (Tears Naturale)  0 drop EA EYE PRN PRN


   PRN Reason: Dry Eyes


Aspirin (Ecotrin)  81 mg PO DAILY [facility]


   Last Admin: 02/04/18 07:54 Dose:  81 mg


Carvedilol (Coreg)  12.5 mg PO BID [facility]


   Last Admin: 02/04/18 07:54 Dose:  12.5 mg


Citalopram Hydrobromide (Celexa)  10 mg PO DAILY [facility]


   Last Admin: 02/04/18 07:54 Dose:  10 mg


Dextrose/Water (Dextrose 50%)  25 gm SLOW IVP PRN PRN


   PRN Reason: Hypoglycemia


Docusate Sodium (Colace)  100 mg PO BID [facility]


   Last Admin: 02/04/18 07:54 Dose:  100 mg


Enoxaparin Sodium (Lovenox)  30 mg SC 0900 [facility]


   Last Admin: 02/04/18 07:54 Dose:  30 mg


Famotidine (Pepcid)  20 mg PO BID [facility]


   Last Admin: 02/04/18 07:53 Dose:  20 mg


Fentanyl (Duragesic)  50 mcg TD Q2DAYS [facility]


   Last Admin: 02/04/18 10:13 Dose:  50 mcg


Furosemide (Lasix)  40 mg SLOW IVP DAILY [facility]


   Stop: 02/06/18 09:01


   Last Admin: 02/04/18 07:54 Dose:  40 mg


Gabapentin (Neurontin)  300 mg PO BID [facility]


   Last Admin: 02/04/18 07:53 Dose:  300 mg


Glucagon (Glucagon)  1 mg IM PRN PRN


   PRN Reason: Hypoglycemia


Guaifenesin (Robitussin Sf)  200 mg PO Q4H PRN


   PRN Reason: Cough


   Last Admin: 02/02/18 14:43 Dose:  200 mg


Hydralazine HCl (Apresoline)  10 mg SLOW IVP Q4H PRN


   PRN Reason: Systolic BP > 180


Hydralazine HCl (Apresoline)  75 mg PO TID [facility]


   Last Admin: 02/04/18 07:53 Dose:  75 mg


Dextrose/Water (D5w)  1,000 mls @ 0 mls/hr IV .Q0M PRN; As Directed


   PRN Reason: Hypoglycemia


Insulin Human Isoph/Insulin Regular (Humulin 70/30)  5 units SC [facility]


   Last Admin: 02/03/18 20:31 Dose:  5 unit


Insulin Human Isoph/Insulin Regular (Humulin 70/30)  [facility]


   Last Admin: 02/04/18 07:54 Dose:  10 unit


Insulin Human Lispro (Humalog)  0 units SC .MODERATE SLIDING SC PRN


   PRN Reason: Moderate Correctional Scale


   Last Admin: 02/03/18 17:10 Dose:  10 unit


Insulin Human Lispro (Humalog)  0 units SC .BEDTIME SLIDING SC PRN


   PRN Reason: Bedtime Correctional Scale


   Last Admin: 02/03/18 20:33 Dose:  3 unit


Levofloxacin (Levaquin)  500 mg PO 0600 [facility]


   Last Admin: 02/04/18 05:42 Dose:  500 mg


Loperamide HCl (Imodium)  2 mg PO PRN PRN


   PRN Reason: Diarrhea/Loose Stools


Loratadine (Claritin)  10 mg PO DAILYPRN PRN


   PRN Reason: Sinus Symptoms


   Last Admin: 02/03/18 20:40 Dose:  10 mg


Magnesium Hydroxide (Milk Of Magnesium)  30 ml PO DAILYPRN PRN


   PRN Reason: Constipation


   Last Admin: 02/02/18 14:43 Dose:  30 ml


Metformin HCl (Glucophage Xr)  500 mg PO 1700 [facility]


   Last Admin: 02/03/18 17:09 Dose:  500 mg


Mineral Oil/White Petrolatum (Eucerin Cream)  0 gm TOP BIDPRN PRN


   PRN Reason: Dry Skin


Ondansetron HCl (Zofran Odt)  4 mg PO Q6H PRN


   PRN Reason: Nausea/Vomiting


Ondansetron HCl (Zofran)  4 mg IVP Q6H PRN


   PRN Reason: Nausea/Vomiting


[Etodolac] 500 Mg  0 each PO BID [facility]


   Last Admin: 02/04/18 08:08 Dose:  Not Given


Phenol (Chloraseptic Spray 180 Ml Bot)  0 ml PO PRN PRN


   PRN Reason: Sore Throat


Prednisone (Prednisone)  20 mg PO QAM-WM [facility]


   Last Admin: 02/04/18 07:53 Dose:  20 mg


Rosuvastatin Calcium (Crestor)  10 mg PO HS [facility]


   Last Admin: 02/03/18 20:28 Dose:  10 mg


Sodium Chloride (Flush - Normal Saline)  10 ml IVF Q12HR [facility]


   Last Admin: 02/04/18 08:09 Dose:  10 ml


Sodium Chloride (Flush - Normal Saline)  10 ml IVF PRN PRN


   PRN Reason: Saline Flush


Sodium Chloride (Ocean Nasal Spray 0.65%)  0 ml EA NARE QIDPRN PRN


   PRN Reason: Nasal Congestion


Valsartan (Diovan)  320 mg PO DAILY [facility]


   Last Admin: 02/04/18 07:52 Dose:  320 mg

## 2018-02-04 NOTE — PRG
DATE OF SERVICE:  02/04/2018

 

SERVICE:  Pulmonary Medicine.

 

INTERVAL HISTORY:  The patient is breathing comfortably today.  She denies any current chest pain, fe
vers, chills, nausea or vomiting.  She does not feel much better today compared to yesterday.  That corey layton said, she is no worse off.  Otherwise, there has been no interval change to her condition.

 

PHYSICAL EXAMINATION:

VITAL SIGNS:  Afebrile, pulse 73, blood pressure 173/69, respirations 16, saturation 97% on 1 liter n
nelson cannula.

GENERAL:  Patient is awake, alert, in no apparent distress.

LUNGS:  Good air entry.  Dependent crackles are minimal.  No prolonged expiratory phase is identified
.

HEART:  Normal rate, regular.

ABDOMEN:  Soft, nontender, nondistended.  Bowel sounds positive.

MUSCULOSKELETAL:  No cyanosis or clubbing.  No pitting in the bilateral lower extremities.

NEUROLOGIC:  Grossly nonfocal.

 

LABORATORY DATA:  Creatinine 0.86.  Basic metabolic profile is otherwise unremarkable.  Blood culture
s x2 and Influenza A and B are unremarkable.

 

ASSESSMENT:

1.  Acute hypoxic respiratory failure, improving.

2.  Acute on chronic diastolic heart failure.

3.  Pulmonary infiltrate that dates all the way back to 02/2014.

 

PLAN:  We will give the patient an additional dose of Lasix tomorrow morning.  From a clearly respira
tory standpoint, she is stable for transition out of the hospital.  The infiltrate probably requires 
no additional investigation as it is growing extraordinarily slowly over period of 4 years.  That kelli scott said, Dr. Christina can address this in the outpatient setting.

## 2018-02-05 VITALS — SYSTOLIC BLOOD PRESSURE: 144 MMHG | DIASTOLIC BLOOD PRESSURE: 69 MMHG

## 2018-02-05 VITALS — TEMPERATURE: 98.7 F

## 2018-02-05 LAB
ANION GAP SERPL CALC-SCNC: 10 MMOL/L (ref 10–20)
BASOPHILS # BLD AUTO: 0.1 THOU/UL (ref 0–0.2)
BASOPHILS NFR BLD AUTO: 0.5 % (ref 0–1)
BUN SERPL-MCNC: 23 MG/DL (ref 9.8–20.1)
CALCIUM SERPL-MCNC: 9.2 MG/DL (ref 7.8–10.44)
CHLORIDE SERPL-SCNC: 101 MMOL/L (ref 98–107)
CO2 SERPL-SCNC: 31 MMOL/L (ref 23–31)
CREAT CL PREDICTED SERPL C-G-VRATE: 53 ML/MIN (ref 70–130)
EOSINOPHIL # BLD AUTO: 0.2 THOU/UL (ref 0–0.7)
EOSINOPHIL NFR BLD AUTO: 1.7 % (ref 0–10)
GLUCOSE SERPL-MCNC: 89 MG/DL (ref 83–110)
HGB BLD-MCNC: 9.9 G/DL (ref 12–16)
LYMPHOCYTES # BLD: 3.1 THOU/UL (ref 1.2–3.4)
LYMPHOCYTES NFR BLD AUTO: 27.1 % (ref 21–51)
MCH RBC QN AUTO: 31 PG (ref 27–31)
MCV RBC AUTO: 96.8 FL (ref 81–99)
MONOCYTES # BLD AUTO: 0.6 THOU/UL (ref 0.11–0.59)
MONOCYTES NFR BLD AUTO: 5 % (ref 0–10)
NEUTROPHILS # BLD AUTO: 7.6 THOU/UL (ref 1.4–6.5)
NEUTROPHILS NFR BLD AUTO: 65.7 % (ref 42–75)
PLATELET # BLD AUTO: 313 THOU/UL (ref 130–400)
POTASSIUM SERPL-SCNC: 4.2 MMOL/L (ref 3.5–5.1)
RBC # BLD AUTO: 3.19 MILL/UL (ref 4.2–5.4)
SODIUM SERPL-SCNC: 138 MMOL/L (ref 136–145)
WBC # BLD AUTO: 11.5 THOU/UL (ref 4.8–10.8)

## 2018-02-05 RX ADMIN — INSULIN LISPRO PRN UNIT: 100 INJECTION, SOLUTION INTRAVENOUS; SUBCUTANEOUS at 12:04

## 2018-02-05 RX ADMIN — INSULIN HUMAN SCH UNIT: 100 INJECTION, SUSPENSION SUBCUTANEOUS at 08:48

## 2018-02-05 RX ADMIN — Medication SCH ML: at 08:48

## 2018-02-05 RX ADMIN — HYDROCODONE BITARTRATE AND ACETAMINOPHEN PRN TAB: 5; 325 TABLET ORAL at 12:05

## 2018-02-05 RX ADMIN — ASPIRIN SCH MG: 81 TABLET ORAL at 08:45

## 2018-02-05 NOTE — PDOC.PN
- Subjective


Encounter Start Date: 02/05/18


Encounter Start Time: 08:20





Patient seen and examined. No new complaints. No overnight events





- Objective


Resuscitation Status: 


 











Resuscitation Status           DNR:Do Not Resuscitate














MAR Reviewed: Yes


Vital Signs & Weight: 


 Vital Signs (12 hours)











  Temp Pulse Resp BP BP Pulse Ox


 


 02/05/18 08:46   57 L   181/71 H  


 


 02/05/18 08:45   57 L   181/71 H  


 


 02/05/18 07:42  98.3 F  57 L  20   181/71 H  94 L


 


 02/05/18 06:35   59 L  16    99


 


 02/05/18 04:16       96


 


 02/05/18 00:07   65  16    98








 Weight











Admit Weight                   163 lb 1 oz


 


Weight                         163 lb 1 oz














I&O: 


 











 02/04/18 02/05/18 02/06/18





 06:59 06:59 06:59


 


Intake Total  460 180


 


Balance  460 180











Result Diagrams: 


 02/05/18 04:14





 02/05/18 04:14


Additional Labs: 


 Accuchecks











  02/05/18 02/04/18 02/04/18





  06:20 19:34 17:15


 


POC Glucose  97  356 H  295 H














  02/04/18





  11:13


 


POC Glucose  159 H














Phys Exam





- Physical Examination


Constitutional: NAD


HEENT: PERRLA, moist MMs, sclera anicteric


Neck: no JVD, supple


Respiratory: no wheezing, no rales, no rhonchi


Cardiovascular: RRR, no significant murmur, no rub


Gastrointestinal: soft, non-tender, no distention, positive bowel sounds


Musculoskeletal: no edema, pulses present


Neurological: non-focal, normal sensation, moves all 4 limbs


Lymphatic: no nodes


Psychiatric: normal affect, A&O x 3


Skin: no rash, normal turgor





Dx/Plan


(1) Right lower lobe pneumonia


Code(s): J18.1 - LOBAR PNEUMONIA, UNSPECIFIED ORGANISM   Status: Acute   


Qualifiers: 


   Aspiration pneumonia type: unspecified 





(2) Anemia, normocytic normochromic


Code(s): D64.9 - ANEMIA, UNSPECIFIED   Status: Chronic   





(3) Anxiety and depression


Code(s): F41.9 - ANXIETY DISORDER, UNSPECIFIED; F32.9 - MAJOR DEPRESSIVE 

DISORDER, SINGLE EPISODE, UNSPECIFIED   Status: Chronic   





(4) CKD (chronic kidney disease) stage 3, GFR 30-59 ml/min


Code(s): N18.3 - CHRONIC KIDNEY DISEASE, STAGE 3 (MODERATE)   Status: Chronic   





(5) COPD (chronic obstructive pulmonary disease)


Status: Chronic   





(6) DM type 2 (diabetes mellitus, type 2)


Status: Chronic   


Qualifiers: 


 





(7) HTN (hypertension)


Code(s): I10 - ESSENTIAL (PRIMARY) HYPERTENSION   Status: Chronic   


Qualifiers: 


 





(8) Osteoarthritis


Code(s): M19.90 - UNSPECIFIED OSTEOARTHRITIS, UNSPECIFIED SITE   Status: 

Chronic   





(9) Physical deconditioning


Code(s): R53.81 - OTHER MALAISE   Status: Chronic   





(10) Pulmonary hypertension


Code(s): I27.2 - OTHER SECONDARY PULMONARY HYPERTENSION * DO NOT USE *   Status

: Chronic   





- Plan


cont current plan of care, continue antibiotics





* medication reviewed as below


* symptomatic treatment


* await rehab placement if approved


* if not approved, will DC to home tomorrow.








Review of Systems





- Review of Systems


ENT: negative: Ear Pain, Ear Discharge, Nose Pain, Nose Discharge, Nose 

Congestion, Mouth Pain, Mouth Swelling, Throat Pain, Throat Swelling, Other


Respiratory: negative: Cough, Dry, Shortness of Breath, Hemoptysis, SOB with 

Excertion, Pleuritic Pain, Sputum, Wheezing


Cardiovascular: negative: chest pain, palpitations, orthopnea, paroxysmal 

nocturnal dyspnea, edema, light headedness, other


Gastrointestinal: negative: Nausea, Vomiting, Abdominal Pain, Diarrhea, 

Constipation, Melena, Hematochezia, Other


Genitourinary: negative: Dysuria, Frequency, Incontinence, Hematuria, Retention

, Other


Musculoskeletal: negative: Neck Pain, Shoulder Pain, Arm Pain, Back Pain, Hand 

Pain, Leg Pain, Foot Pain, Other





- Medications/Allergies


Allergies/Adverse Reactions: 


 Allergies











Allergy/AdvReac Type Severity Reaction Status Date / Time


 


Penicillins Allergy   Verified 01/26/18 23:06











Medications: 


 Current Medications





Acetaminophen (Tylenol)  650 mg PO Q4H PRN


   PRN Reason: Headache/Fever or Pain


   Last Admin: 02/03/18 21:03 Dose:  650 mg


Hydrocodone Bitart/Acetaminophen (Norco 5/325)  1 tab PO Q4H PRN


   PRN Reason: Moderate Pain (4-6)


   Last Admin: 02/04/18 20:22 Dose:  1 tab


Hydrocodone Bitart/Acetaminophen (Norco 10/325)  1 tab PO Q4H PRN


   PRN Reason: Pain


Al Hydroxide/Mg Hydroxide (Maalox)  15 ml PO Q4H PRN


   PRN Reason: Heartburn  or Indigestion


Albuterol Sulfate (Ventolin)  5 mg NEB Q4H PRN


   PRN Reason: SOB &/or Wheezing


Albuterol/Ipratropium (Duoneb)  3 ml NEB [id number] [facility]


   Last Admin: 02/05/18 06:35 Dose:  3 ml


Albuterol/Ipratropium (Duoneb)  3 ml NEB [id number] PRN


   PRN Reason: SOB &/or Wheezing


Amlodipine Besylate (Norvasc)  5 mg PO DAILY [facility]


   Last Admin: 02/05/18 08:46 Dose:  5 mg


Artificial Tears (Tears Naturale)  0 drop EA EYE PRN PRN


   PRN Reason: Dry Eyes


Aspirin (Ecotrin)  81 mg PO DAILY [facility]


   Last Admin: 02/05/18 08:45 Dose:  81 mg


Carvedilol (Coreg)  12.5 mg PO BID [facility]


   Last Admin: 02/05/18 08:45 Dose:  12.5 mg


Citalopram Hydrobromide (Celexa)  10 mg PO DAILY [facility]


   Last Admin: 02/05/18 08:45 Dose:  10 mg


Dextrose/Water (Dextrose 50%)  25 gm SLOW IVP PRN PRN


   PRN Reason: Hypoglycemia


Docusate Sodium (Colace)  100 mg PO BID [facility]


   Last Admin: 02/05/18 08:45 Dose:  100 mg


Enoxaparin Sodium (Lovenox)  30 mg SC 0900 [facility]


   Last Admin: 02/05/18 08:46 Dose:  30 mg


Famotidine (Pepcid)  20 mg PO BID [facility]


   Last Admin: 02/05/18 08:46 Dose:  20 mg


Fentanyl (Duragesic)  50 mcg TD Q2DAYS [facility]


   Last Admin: 02/04/18 10:13 Dose:  50 mcg


Furosemide (Lasix)  40 mg SLOW IVP DAILY [facility]


   Stop: 02/06/18 09:01


   Last Admin: 02/05/18 08:44 Dose:  40 mg


Gabapentin (Neurontin)  300 mg PO BID [facility]


   Last Admin: 02/05/18 08:46 Dose:  300 mg


Glucagon (Glucagon)  1 mg IM PRN PRN


   PRN Reason: Hypoglycemia


Guaifenesin (Robitussin Sf)  200 mg PO Q4H PRN


   PRN Reason: Cough


   Last Admin: 02/02/18 14:43 Dose:  200 mg


Hydralazine HCl (Apresoline)  10 mg SLOW IVP Q4H PRN


   PRN Reason: Systolic BP > 180


Hydralazine HCl (Apresoline)  75 mg PO TID [facility]


   Last Admin: 02/05/18 08:45 Dose:  75 mg


Dextrose/Water (D5w)  1,000 mls @ 0 mls/hr IV .Q0M PRN; As Directed


   PRN Reason: Hypoglycemia


Insulin Human Isoph/Insulin Regular (Humulin 70/30)  5 units SC [facility]


   Last Admin: 02/04/18 20:16 Dose:  5 unit


Insulin Human Isoph/Insulin Regular (Humulin 70/30)  [facility] [place] [facility]


   Last Admin: 02/05/18 08:48 Dose:  10 unit


Insulin Human Lispro (Humalog)  0 units SC .MODERATE SLIDING SC PRN


   PRN Reason: Moderate Correctional Scale


   Last Admin: 02/03/18 17:10 Dose:  10 unit


Insulin Human Lispro (Humalog)  0 units SC .BEDTIME SLIDING SC PRN


   PRN Reason: Bedtime Correctional Scale


   Last Admin: 02/04/18 20:17 Dose:  5 unit


Levofloxacin (Levaquin)  500 mg PO 0600 [facility]


   Last Admin: 02/05/18 05:28 Dose:  500 mg


Loperamide HCl (Imodium)  2 mg PO PRN PRN


   PRN Reason: Diarrhea/Loose Stools


Loratadine (Claritin)  10 mg PO DAILYPRN PRN


   PRN Reason: Sinus Symptoms


   Last Admin: 02/03/18 20:40 Dose:  10 mg


Magnesium Hydroxide (Milk Of Magnesium)  30 ml PO DAILYPRN PRN


   PRN Reason: Constipation


   Last Admin: 02/02/18 14:43 Dose:  30 ml


Metformin HCl (Glucophage Xr)  500 mg PO 1700 [facility]


   Last Admin: 02/04/18 17:16 Dose:  500 mg


Mineral Oil/White Petrolatum (Eucerin Cream)  0 gm TOP BIDPRN PRN


   PRN Reason: Dry Skin


Ondansetron HCl (Zofran Odt)  4 mg PO Q6H PRN


   PRN Reason: Nausea/Vomiting


Ondansetron HCl (Zofran)  4 mg IVP Q6H PRN


   PRN Reason: Nausea/Vomiting


[Etodolac] 500 Mg  0 each PO BID [facility]


   Last Admin: 02/05/18 08:48 Dose:  Not Given


Phenol (Chloraseptic Spray 180 Ml Bot)  0 ml PO PRN PRN


   PRN Reason: Sore Throat


Prednisone (Prednisone)  20 mg PO QAM-WM [facility]


   Last Admin: 02/05/18 08:45 Dose:  20 mg


Rosuvastatin Calcium (Crestor)  10 mg PO HS [facility]


   Last Admin: 02/04/18 20:16 Dose:  10 mg


Sodium Chloride (Flush - Normal Saline)  10 ml IVF Q12HR [facility]


   Last Admin: 02/05/18 08:48 Dose:  10 ml


Sodium Chloride (Flush - Normal Saline)  10 ml IVF PRN PRN


   PRN Reason: Saline Flush


Sodium Chloride (Ocean Nasal Spray 0.65%)  0 ml EA NARE QIDPRN PRN


   PRN Reason: Nasal Congestion


Valsartan (Diovan)  320 mg PO DAILY [facility]


   Last Admin: 02/05/18 10:17 Dose:  320 mg

## 2018-02-05 NOTE — PRG
DATE OF SERVICE:  02/05/2018

 

PHYSICAL EXAMINATION: 

VITAL SIGNS:  Sats are 94% on room air, respirations 20, temperature 98, pulse 57, blood pressure 180
/71.  

CHEST:  Denies difficulty breathing or coughing.  Chest reveals bilateral crackles.

CARDIAC:  Normal S1 and S2. 

ABDOMEN:  Soft, no masses. 

 

LABORATORY:  White count 11,000, H&H  9 and 30.  Electrolytes are normal.

 

IMPRESSION:

1.  Bilateral crackles, chronic.  

2.  Chronic lung disease.

3.  Diastolic dysfunction.

4.  Respiratory failure.

5.  Obesity.

 

PLAN:  She can be discharged home anytime with oral medication.  Follow up with her primary care phys
chiquis.  Follow up with Dr. Christina in a month.

## 2018-02-05 NOTE — DIS
PRIMARY CARE PHYSICIAN:  Bhavya Pitts M.D.

 

DATE OF ADMISSION:  02/01/2018

 

DATE OF DISCHARGE:  02/05/2018

 

DISCHARGE DISPOSITION:  Rehabilitation.

 

PRIMARY DISCHARGE DIAGNOSES:  Right lower lobe pneumonia; acute on chronic diastolic heart failure; a
cute hypoxic respiratory failure, resolved; chronic obstructive pulmonary disease exacerbation.

 

SECONDARY DISCHARGE DIAGNOSES:  Pulmonary hypertension, physical deconditioning, osteoarthritis, hype
rtension, diabetes type 2, COPD, chronic kidney disease stage 3, anxiety, depression, normocytic norm
ochromic anemia, chronic diastolic heart failure.

 

PRIMARY PROCEDURES/OPERATIONS:  None.

 

RADIOLOGICAL INVESTIGATION:  Chest x-ray showed right lower lobe infiltration as well as mildly worse
jayce pulmonary edema.  Abdomen and pelvis CT scan showed persistent right lower lobe peripheral airsp
ace consolidation.

 

SIGNIFICANT LABS:  WBC 11.5, hemoglobin 9.9, platelets 313.  Sodium 138, potassium 4.2, BUN 23, creat
inine 0.84, calcium 9.2.  Blood culture negative, influenza negative.

 

DISCHARGE MEDICATIONS:  Amlodipine with valsartan 5/320 one tablet p.o. daily, aspirin 81 mg p.o. mike
ly, Coreg 12.5 mg p.o. t.i.d., Celexa 10 mg p.o. daily, etodolac 500 mg p.o. b.i.d., Duragesic 50 mcg
 transdermal every 2 days, Advair 2 inhalations b.i.d., Lasix 40 mg p.o. b.i.d., gabapentin 300 mg p.
o. t.i.d., hydralazine 75 mg p.o. t.i.d., Norco 1 tablet q.4 hourly p.r.n., insulin 70/30 10 units in
 the morning and 5 units at bedtime, DuoNeb q.4 hourly, Levaquin 500 mg p.o. daily for 7 days, metfor
min 500 mg p.o. daily, prednisone 20 mg p.o. daily for 7 days, Crestor 10 mg p.o. at bedtime.

 

CONTRAINDICATIONS:  None.

 

CODE STATUS:  DNR.

 

INPATIENT CONSULTANTS:  Dr. Varela, Dr. Christina, and Dr. Rodriguez saw this patient while in hospital and
 they are not thinking that this patient needs anymore investigation and they are not thinking about 
adenocarcinoma of lung.

 

DISCHARGE PLAN:  Post hospital, the patient is planned for discharge to rehab.  Subsequently, the pat
ient will follow up with primary care physician.

 

HOSPITAL COURSE:  An 89-year-old female with above-mentioned medical problem who was admitted by Dr. Pathak.  Please see her H&P for further details.  This patient was presented to the emergency room wi
th increasing shortness of breath and epigastric pain.  She had chest x-ray which showed worsening pu
lmonary edema as the patient was complaining of epigastric pain and that is why CT of the abdomen and
 pelvis was done which showed right lower lobe persistent infiltration.  The patient was treated with
 IV Lasix.  The patient was given empiric antibiotic therapy.  Dr. Christina, Dr. Varela, and Dr. Rodriguez
 saw this patient while in hospital.  She was also treated for COPD flare-up.  This patient requested
 to go to rehab because of her physical deconditioning.  This patient was DNR.  Palliative Care saw t
his patient.  Out of hospital DNR paperwork was done.

 

The patient was accepted for rehab today.  Paperwork for discharge done.  Discharge medication reconc
iliation done.

 

The patient was earlier seen bedside and examined.  Please see my progress note from today for furthe
r detail.  After rehabilitation acceptance, the patient was notified and the patient is planned for d
ischarge to rehabilitation.  Overall, the patient is medically stable for discharge today.

 

Total time spent on discharge day 31 minutes.

## 2018-02-13 ENCOUNTER — HOSPITAL ENCOUNTER (INPATIENT)
Dept: HOSPITAL 92 - ERS | Age: 83
LOS: 44 days | Discharge: HOME | DRG: 329 | End: 2018-03-29
Attending: SURGERY | Admitting: SURGERY
Payer: MEDICARE

## 2018-02-13 VITALS — BODY MASS INDEX: 31.6 KG/M2

## 2018-02-13 DIAGNOSIS — Z79.4: ICD-10-CM

## 2018-02-13 DIAGNOSIS — K63.2: ICD-10-CM

## 2018-02-13 DIAGNOSIS — I13.0: ICD-10-CM

## 2018-02-13 DIAGNOSIS — Z78.1: ICD-10-CM

## 2018-02-13 DIAGNOSIS — N18.9: ICD-10-CM

## 2018-02-13 DIAGNOSIS — E11.22: ICD-10-CM

## 2018-02-13 DIAGNOSIS — Z88.0: ICD-10-CM

## 2018-02-13 DIAGNOSIS — I48.0: ICD-10-CM

## 2018-02-13 DIAGNOSIS — Z66: ICD-10-CM

## 2018-02-13 DIAGNOSIS — G93.40: ICD-10-CM

## 2018-02-13 DIAGNOSIS — Z79.82: ICD-10-CM

## 2018-02-13 DIAGNOSIS — E87.0: ICD-10-CM

## 2018-02-13 DIAGNOSIS — E78.5: ICD-10-CM

## 2018-02-13 DIAGNOSIS — E46: ICD-10-CM

## 2018-02-13 DIAGNOSIS — E86.0: ICD-10-CM

## 2018-02-13 DIAGNOSIS — I50.32: ICD-10-CM

## 2018-02-13 DIAGNOSIS — F03.90: ICD-10-CM

## 2018-02-13 DIAGNOSIS — Z79.899: ICD-10-CM

## 2018-02-13 DIAGNOSIS — E66.9: ICD-10-CM

## 2018-02-13 DIAGNOSIS — R57.1: ICD-10-CM

## 2018-02-13 DIAGNOSIS — Z51.5: ICD-10-CM

## 2018-02-13 DIAGNOSIS — Z86.711: ICD-10-CM

## 2018-02-13 DIAGNOSIS — K56.7: ICD-10-CM

## 2018-02-13 DIAGNOSIS — D64.9: ICD-10-CM

## 2018-02-13 DIAGNOSIS — J44.9: ICD-10-CM

## 2018-02-13 DIAGNOSIS — T81.83XA: ICD-10-CM

## 2018-02-13 DIAGNOSIS — M54.5: ICD-10-CM

## 2018-02-13 DIAGNOSIS — N17.9: ICD-10-CM

## 2018-02-13 DIAGNOSIS — K56.52: Primary | ICD-10-CM

## 2018-02-13 DIAGNOSIS — F41.9: ICD-10-CM

## 2018-02-13 DIAGNOSIS — I70.0: ICD-10-CM

## 2018-02-13 DIAGNOSIS — G89.29: ICD-10-CM

## 2018-02-13 DIAGNOSIS — I25.10: ICD-10-CM

## 2018-02-13 LAB
ALBUMIN SERPL BCG-MCNC: 3.6 G/DL (ref 3.4–4.8)
ALP SERPL-CCNC: 49 U/L (ref 40–150)
ALT SERPL W P-5'-P-CCNC: (no result) U/L (ref 8–55)
ANION GAP SERPL CALC-SCNC: 17 MMOL/L (ref 10–20)
AST SERPL-CCNC: 13 U/L (ref 5–34)
BASOPHILS # BLD AUTO: 0 THOU/UL (ref 0–0.2)
BASOPHILS NFR BLD AUTO: 0.2 % (ref 0–1)
BILIRUB SERPL-MCNC: 0.4 MG/DL (ref 0.2–1.2)
BUN SERPL-MCNC: 73 MG/DL (ref 9.8–20.1)
CALCIUM SERPL-MCNC: 9.4 MG/DL (ref 7.8–10.44)
CHLORIDE SERPL-SCNC: 91 MMOL/L (ref 98–107)
CO2 SERPL-SCNC: 31 MMOL/L (ref 23–31)
CREAT CL PREDICTED SERPL C-G-VRATE: 0 ML/MIN (ref 70–130)
EOSINOPHIL # BLD AUTO: 0 THOU/UL (ref 0–0.7)
EOSINOPHIL NFR BLD AUTO: 0.3 % (ref 0–10)
GLOBULIN SER CALC-MCNC: 3.9 G/DL (ref 2.4–3.5)
GLUCOSE SERPL-MCNC: 225 MG/DL (ref 83–110)
HGB BLD-MCNC: 11.6 G/DL (ref 12–16)
LIPASE SERPL-CCNC: 126 U/L (ref 8–78)
LYMPHOCYTES # BLD: 1.5 THOU/UL (ref 1.2–3.4)
LYMPHOCYTES NFR BLD AUTO: 9.2 % (ref 21–51)
MCH RBC QN AUTO: 30.5 PG (ref 27–31)
MCV RBC AUTO: 93.9 FL (ref 81–99)
MONOCYTES # BLD AUTO: 0.5 THOU/UL (ref 0.11–0.59)
MONOCYTES NFR BLD AUTO: 2.9 % (ref 0–10)
NEUTROPHILS # BLD AUTO: 14.3 THOU/UL (ref 1.4–6.5)
NEUTROPHILS NFR BLD AUTO: 87.5 % (ref 42–75)
PLATELET # BLD AUTO: 292 THOU/UL (ref 130–400)
POTASSIUM SERPL-SCNC: 5.3 MMOL/L (ref 3.5–5.1)
RBC # BLD AUTO: 3.81 MILL/UL (ref 4.2–5.4)
SODIUM SERPL-SCNC: 134 MMOL/L (ref 136–145)
SP GR UR STRIP: 1.02 (ref 1–1.04)
WBC # BLD AUTO: 16.3 THOU/UL (ref 4.8–10.8)

## 2018-02-13 PROCEDURE — A4216 STERILE WATER/SALINE, 10 ML: HCPCS

## 2018-02-13 PROCEDURE — 81003 URINALYSIS AUTO W/O SCOPE: CPT

## 2018-02-13 PROCEDURE — A4217 STERILE WATER/SALINE, 500 ML: HCPCS

## 2018-02-13 PROCEDURE — 87077 CULTURE AEROBIC IDENTIFY: CPT

## 2018-02-13 PROCEDURE — 36569 INSJ PICC 5 YR+ W/O IMAGING: CPT

## 2018-02-13 PROCEDURE — 80076 HEPATIC FUNCTION PANEL: CPT

## 2018-02-13 PROCEDURE — 51701 INSERT BLADDER CATHETER: CPT

## 2018-02-13 PROCEDURE — 83036 HEMOGLOBIN GLYCOSYLATED A1C: CPT

## 2018-02-13 PROCEDURE — 74019 RADEX ABDOMEN 2 VIEWS: CPT

## 2018-02-13 PROCEDURE — 86900 BLOOD TYPING SEROLOGIC ABO: CPT

## 2018-02-13 PROCEDURE — 96374 THER/PROPH/DIAG INJ IV PUSH: CPT

## 2018-02-13 PROCEDURE — 36416 COLLJ CAPILLARY BLOOD SPEC: CPT

## 2018-02-13 PROCEDURE — 86901 BLOOD TYPING SEROLOGIC RH(D): CPT

## 2018-02-13 PROCEDURE — 81001 URINALYSIS AUTO W/SCOPE: CPT

## 2018-02-13 PROCEDURE — 96375 TX/PRO/DX INJ NEW DRUG ADDON: CPT

## 2018-02-13 PROCEDURE — 93005 ELECTROCARDIOGRAM TRACING: CPT

## 2018-02-13 PROCEDURE — 94002 VENT MGMT INPAT INIT DAY: CPT

## 2018-02-13 PROCEDURE — 94640 AIRWAY INHALATION TREATMENT: CPT

## 2018-02-13 PROCEDURE — 36430 TRANSFUSION BLD/BLD COMPNT: CPT

## 2018-02-13 PROCEDURE — 87040 BLOOD CULTURE FOR BACTERIA: CPT

## 2018-02-13 PROCEDURE — 72100 X-RAY EXAM L-S SPINE 2/3 VWS: CPT

## 2018-02-13 PROCEDURE — A4353 INTERMITTENT URINARY CATH: HCPCS

## 2018-02-13 PROCEDURE — P9016 RBC LEUKOCYTES REDUCED: HCPCS

## 2018-02-13 PROCEDURE — 74250 X-RAY XM SM INT 1CNTRST STD: CPT

## 2018-02-13 PROCEDURE — 84134 ASSAY OF PREALBUMIN: CPT

## 2018-02-13 PROCEDURE — 74022 RADEX COMPL AQT ABD SERIES: CPT

## 2018-02-13 PROCEDURE — 87086 URINE CULTURE/COLONY COUNT: CPT

## 2018-02-13 PROCEDURE — 80048 BASIC METABOLIC PNL TOTAL CA: CPT

## 2018-02-13 PROCEDURE — 88307 TISSUE EXAM BY PATHOLOGIST: CPT

## 2018-02-13 PROCEDURE — 82805 BLOOD GASES W/O2 SATURATION: CPT

## 2018-02-13 PROCEDURE — 87186 SC STD MICRODIL/AGAR DIL: CPT

## 2018-02-13 PROCEDURE — 82533 TOTAL CORTISOL: CPT

## 2018-02-13 PROCEDURE — 93010 ELECTROCARDIOGRAM REPORT: CPT

## 2018-02-13 PROCEDURE — 85025 COMPLETE CBC W/AUTO DIFF WBC: CPT

## 2018-02-13 PROCEDURE — 83735 ASSAY OF MAGNESIUM: CPT

## 2018-02-13 PROCEDURE — 83690 ASSAY OF LIPASE: CPT

## 2018-02-13 PROCEDURE — 74176 CT ABD & PELVIS W/O CONTRAST: CPT

## 2018-02-13 PROCEDURE — 82570 ASSAY OF URINE CREATININE: CPT

## 2018-02-13 PROCEDURE — C1751 CATH, INF, PER/CENT/MIDLINE: HCPCS

## 2018-02-13 PROCEDURE — P9047 ALBUMIN (HUMAN), 25%, 50ML: HCPCS

## 2018-02-13 PROCEDURE — 71045 X-RAY EXAM CHEST 1 VIEW: CPT

## 2018-02-13 PROCEDURE — 94003 VENT MGMT INPAT SUBQ DAY: CPT

## 2018-02-13 PROCEDURE — P9045 ALBUMIN (HUMAN), 5%, 250 ML: HCPCS

## 2018-02-13 PROCEDURE — 84100 ASSAY OF PHOSPHORUS: CPT

## 2018-02-13 PROCEDURE — 85610 PROTHROMBIN TIME: CPT

## 2018-02-13 PROCEDURE — 36415 COLL VENOUS BLD VENIPUNCTURE: CPT

## 2018-02-13 PROCEDURE — 86850 RBC ANTIBODY SCREEN: CPT

## 2018-02-13 PROCEDURE — 96361 HYDRATE IV INFUSION ADD-ON: CPT

## 2018-02-13 PROCEDURE — 80053 COMPREHEN METABOLIC PANEL: CPT

## 2018-02-13 PROCEDURE — 84300 ASSAY OF URINE SODIUM: CPT

## 2018-02-13 PROCEDURE — 74018 RADEX ABDOMEN 1 VIEW: CPT

## 2018-02-13 NOTE — RAD
AP ABDOMINAL RADIOGRAPH

2/13/18

 

HISTORY: 

Small bowel obstruction. Nasogastric tube placement. 

 

FINDINGS:  

Nasogastric tube is noted in place with the tip overlying the body of the stomach. There is gaseous d
istention of the stomach. Remainder of the bowel gas pattern appears nonspecific. Surgical clips over
lie the right upper quadrant. Minimal mild chronic bibasilar lung changes are present. Degenerative c
hanges are noted in the spine. 

 

IMPRESSION:  

Nasogastric tube noted in place with tip overlying the body of the stomach. There is gaseous distenti
on of the stomach. 

 

POS: [facility]

## 2018-02-13 NOTE — CT
ABDOMEN AND PELVIC CT NONCONTRAST

2/13/18

 

INDICATION:

Abdominal pain and vomiting. 

 

Reference made to 2/9/14 exam.

 

There is abnormal distention of the small bowel with fluid and fecal contents, incompletely assessed 
by noncontrast technique although given decompressed small caliber distal small bowel, this indicates
 a moderate to high grade mechanical obstruction. There is prominent distention of the gastric lumen,
 as well. No portal venous gas or disseminated free air. There is moderate distention of the urinary 
bladder. Diffuse vascular disease present. Redemonstration of exophytic hypodensity emanating from th
e posterolateral left kidney. Splenic granulomatous calcifications are present and there are calcifie
d granulomas of the imaged lower chest. Fibrosis and/or scarring seen at the lung bases. There is bran
stomotic suture material seen within the right lower quadrant. Correlate with prior surgical history.


 

IMPRESSION:  

Findings indicate moderate to high grade mechanical bowel obstruction, although limited by noncontras
t technique. Site of transition is favored to reside within the central low abdomen. Recommend surgic
al consultation for further assessment. 

 

POS: [facility]

## 2018-02-13 NOTE — RAD
PORTABLE AP CHEST X-RAY

2/13/18

 

HISTORY: 

Small bowel obstruction. 

 

FINDINGS:  

Comparison 2/1/18.

 

FINDINGS:  

A nasogastric tube is noted in place, but the tip overlies the right main stem bronchus with the tip 
at the right lung base. The cardiac silhouette and pulmonary vasculature are within normal limits. Mi
nimal interstitial densities are seen at each lung base probably related to mild chronic lung change 
The lungs are otherwise clear. There is gaseous distention of the stomach. Postsurgical changes relat
ed to right glenohumeral prosthesis are present. Vascular calcifications are seen in the thoracic aor
ta. 

 

IMPRESSION:  

1.      Nasogastric tube is noted to overlie the right main stem bronchus and should be removed. 

2.      Gaseous distention of the stomach.

3.      Mild chronic lung changes without evidence of an acute cardiopulmonary process.

4.      Above findings discussed with Dr. Mccoy in the Emergency Department on 2/13/18 at 2247 hour
s

 

POS: [facility]

## 2018-02-14 LAB
ALBUMIN SERPL BCG-MCNC: 3.3 G/DL (ref 3.4–4.8)
ALP SERPL-CCNC: 46 U/L (ref 40–150)
ALT SERPL W P-5'-P-CCNC: (no result) U/L (ref 8–55)
ANALYZER IN CARDIO: (no result)
ANION GAP SERPL CALC-SCNC: 15 MMOL/L (ref 10–20)
AST SERPL-CCNC: 13 U/L (ref 5–34)
BASE EXCESS STD BLDA CALC-SCNC: -3.6 MEQ/L
BASOPHILS # BLD AUTO: 0 THOU/UL (ref 0–0.2)
BASOPHILS # BLD AUTO: 0 THOU/UL (ref 0–0.2)
BASOPHILS NFR BLD AUTO: 0.1 % (ref 0–1)
BASOPHILS NFR BLD AUTO: 0.8 % (ref 0–1)
BILIRUB SERPL-MCNC: 0.4 MG/DL (ref 0.2–1.2)
BUN SERPL-MCNC: 73 MG/DL (ref 9.8–20.1)
CA-I BLDA-SCNC: 1 MMOL/L (ref 1.12–1.3)
CALCIUM SERPL-MCNC: 8.8 MG/DL (ref 7.8–10.44)
CHLORIDE SERPL-SCNC: 95 MMOL/L (ref 98–107)
CO2 SERPL-SCNC: 31 MMOL/L (ref 23–31)
CREAT CL PREDICTED SERPL C-G-VRATE: 17 ML/MIN (ref 70–130)
EOSINOPHIL # BLD AUTO: 0 THOU/UL (ref 0–0.7)
EOSINOPHIL # BLD AUTO: 0 THOU/UL (ref 0–0.7)
EOSINOPHIL NFR BLD AUTO: 0.3 % (ref 0–10)
EOSINOPHIL NFR BLD AUTO: 0.4 % (ref 0–10)
GLOBULIN SER CALC-MCNC: 3.6 G/DL (ref 2.4–3.5)
GLUCOSE SERPL-MCNC: 104 MG/DL (ref 83–110)
HCO3 BLDA-SCNC: 20.8 MEQ/L (ref 22–26)
HCT VFR BLDA CALC: 33.3 % (ref 36–47)
HGB BLD-MCNC: 10.6 G/DL (ref 12–16)
HGB BLD-MCNC: 11 G/DL (ref 12–16)
HGB BLDA-MCNC: 10.6 G/DL (ref 12–16)
LYMPHOCYTES # BLD: 0.8 THOU/UL (ref 1.2–3.4)
LYMPHOCYTES # BLD: 2.1 THOU/UL (ref 1.2–3.4)
LYMPHOCYTES NFR BLD AUTO: 12.5 % (ref 21–51)
LYMPHOCYTES NFR BLD AUTO: 13.4 % (ref 21–51)
MCH RBC QN AUTO: 31 PG (ref 27–31)
MCH RBC QN AUTO: 31.3 PG (ref 27–31)
MCV RBC AUTO: 94.9 FL (ref 81–99)
MCV RBC AUTO: 94.9 FL (ref 81–99)
MONOCYTES # BLD AUTO: 0.1 THOU/UL (ref 0.11–0.59)
MONOCYTES # BLD AUTO: 0.9 THOU/UL (ref 0.11–0.59)
MONOCYTES NFR BLD AUTO: 1.7 % (ref 0–10)
MONOCYTES NFR BLD AUTO: 5.4 % (ref 0–10)
NEUTROPHILS # BLD AUTO: 13.7 THOU/UL (ref 1.4–6.5)
NEUTROPHILS # BLD AUTO: 4.8 THOU/UL (ref 1.4–6.5)
NEUTROPHILS NFR BLD AUTO: 81.8 % (ref 42–75)
NEUTROPHILS NFR BLD AUTO: 83.7 % (ref 42–75)
O2 A-A PPRESDIFF RESPIRATORY: 268.82 MM[HG] (ref 0–20)
PCO2 BLDA: 35.1 MMHG (ref 35–45)
PH BLDA: 7.39 [PH] (ref 7.35–7.45)
PLATELET # BLD AUTO: 182 THOU/UL (ref 130–400)
PLATELET # BLD AUTO: 263 THOU/UL (ref 130–400)
PO2 BLDA: 186.4 MMHG (ref 80–100)
POTASSIUM SERPL-SCNC: 4.9 MMOL/L (ref 3.5–5.1)
RBC # BLD AUTO: 3.4 MILL/UL (ref 4.2–5.4)
RBC # BLD AUTO: 3.52 MILL/UL (ref 4.2–5.4)
SODIUM SERPL-SCNC: 136 MMOL/L (ref 136–145)
SPECIMEN DRAWN FROM PATIENT: (no result)
WBC # BLD AUTO: 16.8 THOU/UL (ref 4.8–10.8)
WBC # BLD AUTO: 5.8 THOU/UL (ref 4.8–10.8)

## 2018-02-14 PROCEDURE — 05H633Z INSERTION OF INFUSION DEVICE INTO LEFT SUBCLAVIAN VEIN, PERCUTANEOUS APPROACH: ICD-10-PCS | Performed by: SURGERY

## 2018-02-14 PROCEDURE — 0DBA0ZZ EXCISION OF JEJUNUM, OPEN APPROACH: ICD-10-PCS | Performed by: SURGERY

## 2018-02-14 PROCEDURE — 5A1945Z RESPIRATORY VENTILATION, 24-96 CONSECUTIVE HOURS: ICD-10-PCS | Performed by: SURGERY

## 2018-02-14 PROCEDURE — 0DBB0ZZ EXCISION OF ILEUM, OPEN APPROACH: ICD-10-PCS | Performed by: SURGERY

## 2018-02-14 PROCEDURE — 0DNU0ZZ RELEASE OMENTUM, OPEN APPROACH: ICD-10-PCS | Performed by: SURGERY

## 2018-02-14 PROCEDURE — 0DNB0ZZ RELEASE ILEUM, OPEN APPROACH: ICD-10-PCS | Performed by: SURGERY

## 2018-02-14 RX ADMIN — CLINDAMYCIN PHOSPHATE SCH MLS: 600 INJECTION, SOLUTION INTRAVENOUS at 22:30

## 2018-02-14 RX ADMIN — CLINDAMYCIN PHOSPHATE SCH MLS: 600 INJECTION, SOLUTION INTRAVENOUS at 12:16

## 2018-02-14 RX ADMIN — NOREPINEPHRINE BITARTRATE PRN MLS: 1 INJECTION INTRAVENOUS at 21:52

## 2018-02-14 RX ADMIN — HEPARIN SODIUM SCH UNITS: 5000 INJECTION, SOLUTION INTRAVENOUS; SUBCUTANEOUS at 22:28

## 2018-02-14 RX ADMIN — Medication SCH: at 22:26

## 2018-02-14 RX ADMIN — INSULIN LISPRO PRN UNIT: 100 INJECTION, SOLUTION INTRAVENOUS; SUBCUTANEOUS at 23:50

## 2018-02-14 RX ADMIN — Medication SCH: at 08:04

## 2018-02-14 NOTE — PRG
DATE OF SERVICE:  02/14/2018

 

SUBJECTIVE:  Ms. Edwards is an 89-year-old black female who was seen for an acute kidney injury at the 
rehab.  At that time she was getting ARB, diuretics and diclofenac.  This was discontinued yesterday.
  She was given IV hydration on admission here and there is already slight improvement with her renal
 function.  She most likely has a hemodynamically mediated renal dysfunction.  She was transferred fr
 rehab due to the nausea and vomiting.  CT scan showed moderate to high grade mechanical bowel obst
ruction.  Surgery is following her up.  

 

No new complaints today.  She has an NG tube noted.

 

PHYSICAL EXAMINATION: 

VITAL SIGNS:  Blood pressure is 112/59, heart rate 72, respiratory rate 18, temperature 98.1, pulse o
x 95%.

GENERAL:  Noted to be awake, alert, comfortable, not in distress.

SKIN:  Adequate turgor.

HEENT:  She has pinkish conjunctivae, anicteric sclerae.

NECK:  No neck mass, no carotid bruits, no JVD.

CHEST:  No deformities.

LUNGS:  Clear breath sounds.  No wheezing, no crackles.

HEART:  Normal sinus rhythm.  No murmur, no gallops or rubs.

ABDOMEN:  Globular, soft, nontender, no masses.

EXTREMITIES:  No edema, no deformities.

 

MEDICATIONS:  02/14/2018 - Reviewed.

 

LABORATORY:  02/13/2018 - Urinalysis benign, no casts noted.  

 

02/14/2018 - Sodium 136, potassium 4.9, chloride 95, carbon dioxide 31, BUN 73, creatinine 2.9.

 

02/13/2018 - Creatinine 3.73, 3.27.

 

 Previous to this, the creatinine was noted at 3.5 and prior to this the renal function was normal.

 

ASSESSMENT AND PLAN:    Acute kidney injury - hemodynamically mediated renal dysfunction.  Continue c
urrent management.  Continue gentle volume repletion.  Consider decreasing normal saline from 150 to 
100 mL per hour.  She is off of her losartan, diuretics, and diclofenac.  I do anticipate eventual im
provement of the renal function back to baseline.  No indication for any dialytic intervention.

## 2018-02-14 NOTE — RAD
PORTABLE AP CHEST X-RAY

2/14/18

 

HISTORY: 

On ventilator. Central line placement. 

 

COMPARISON:  

2/13/18.

 

FINDINGS:  

The nasogastric tube has been repositioned in the interim with the nasogastric tube now coursing into
 the upper abdomen, the tip of which is not imaged. Endotracheal tube is now noted in place with the 
tip overlying the T3-4 level and above the level of the lul. The left subclavian central venous ca
theter is noted in place with tip overlying the cavoatrial junction. There is no evidence of a pneumo
thorax. There is minimal patchy density at the right lung base which may represent atelectasis. Lungs
 otherwise appear clear. There is no other interval change from the prior exam. 

 

IMPRESSION:  

1.      Lines and tubes in place as described above. 

2.      Probable mild atelectasis at the right lung base. This can be re-evaluated on followup exam. 


 

POS: [facility]

## 2018-02-14 NOTE — HP
HISTORY OF PRESENT ILLNESS:  Esthela Edwards is an 89-year-old female, who lives at home with diabetes,
 hypertension, has been sick since Friday, 6 days obstipated, seen in the emergency room last night. 
 X-rays and CAT scans suggestive of a complete bowel obstruction.  NG tube placed.  She has had fecul
ent output, foul smelling.  She had an abdominal x-ray this morning suggesting a nonspecific bowel ga
s pattern, but the patient has not passed any flatus or stool.  Her abdomen is very tender on the lef
t with guarding.  White count was 16 on admission yesterday, 16.8 today.  Hemoglobin 11.6 yesterday, 
11.0 today.  BUN was 73 yesterday, 73 this morning.  Creatinine 3.27 yesterday, 2.9 today.  Her carbo
n dioxide remains at 31, sodium 136.  I believe the ER physician consult, Dr. Silverman, who saw this aidan scott believes that her acute kidney injury is hemodynamically mediated due to dehydration.  She has bee
n on 1.5 maintenance IV fluids.  I have talked to the patient and her daughter and plan is for laparo
gabriela due to bowel obstruction as well as placement of a central line.

 

ALLERGIES:  PENICILLIN.

 

TOBACCO:  Never.

 

ALCOHOL:  None.

 

MEDICATIONS:  At home, Zanaflex 4 tablets at bedtime; etodolac 500 mg p.r.n. pain, discontinued due t
o acute kidney injury; amlodipine 5/320 daily; hydrocodone p.r.n. pain; gabapentin 300 mg t.i.d.; Adv
air Diskus 2 inhalers INH b.i.d.; Celexa 10 mg daily; carvedilol 12.5 mg t.i.d.; aspirin 81 mg daily;
 metformin 500 mg 17:00;, hydralazine 75 mg t.i.d.; Duragesic patch q.2 days; Crestor 10 mg at bedtim
e; DuoNeb p.r.n.; insulin 5 units subcu at bedtime and 10 units a.m.; insulin NovoLog 70/30 FlexPen.

 

PAST SURGICAL HISTORY:  Hysterectomy, she is not sure if she had oophorectomy; cholecystectomy; rotat
or cuff repair, arthroplasty, cataract surgery.  She has had a laparotomy in the past for small bowel
 resection done several years ago.

 

PAST MEDICAL HISTORY:  COPD, followed by Dr. Christina.  Diabetes mellitus, insulin dependent; hyperlipide
jose ramon, hypertension, history of pulmonary embolism, anxiety.

 

PHYSICAL EXAMINATION:

VITAL SIGNS:  5 feet 5, 181 pounds, 30 BMI, 98.1, 72, 18, 112/59.

HEAD, EYES, EARS, NOSE, AND THROAT:  Unremarkable.

LUNGS:  Clear to auscultation.

CARDIAC:  Regular rate and rhythm without murmur, rub, or gallop.

ABDOMEN:  Soft, bowel sounds present, exquisitely tender with guarding in her left abdomen.

EXTREMITIES:  Unremarkable.  NG tube in place with feculent drainage.

 

ASSESSMENT AND PLAN:

1.  Small-bowel obstruction by CAT scan yesterday, and even though her abdominal x-rays today look be
tter, she is having feculent output of her NG tube and has significant guarding with leukocytosis.  LILLIANA traylor would recommend laparotomy to rule out ischemic bowel and indicated procedures.  I have discussed w
shaheen the patient and her daughter, both consent, we will plan today.  We will plan to give her DuoNeb,
 Hibiclens bath, and Lovenox preoperatively.

2.  Acute kidney injury, improved with hydration.  Dr. Silverman is following, NSAIDs have been discontinue
d.

3.  History of diabetes mellitus.  Accu-Cheks instituted

4.  Hypertension.

5.  Chronic obstructive pulmonary disease, followed by Pulmonary Medicine.

6.  DNR status.  We will suspend that currently and order it postoperatively.

## 2018-02-14 NOTE — RAD
2 VIEWS ABDOMEN:

 

Date:  02/14/18 

 

HISTORY:  

Evaluate for small bowel obstruction. 

 

COMPARISON:  

02/13/18. 

 

FINDINGS:

Supine and left lateral decubitus abdomen radiograph series demonstrates a nasogastric tube in the ep
igastric region. There is no evidence of small bowel distention or dilatation. There is mild gastric 
prominence. No differential air fluid levels. No definite pneumoperitoneum. Scattered fecal material 
in nondistended, nondilated colon. 

 

IMPRESSION: 

Nonspecific bowel gas pattern. 

 

 

 

POS: [facility]

## 2018-02-15 LAB
ANALYZER IN CARDIO: (no result)
ANION GAP SERPL CALC-SCNC: 15 MMOL/L (ref 10–20)
BACTERIA UR QL AUTO: (no result) HPF
BASE EXCESS STD BLDA CALC-SCNC: -3.7 MEQ/L
BASOPHILS # BLD AUTO: 0 THOU/UL (ref 0–0.2)
BASOPHILS NFR BLD AUTO: 1 % (ref 0–1)
BUN SERPL-MCNC: 72 MG/DL (ref 9.8–20.1)
CA-I BLDA-SCNC: 0.9 MMOL/L (ref 1.12–1.3)
CALCIUM SERPL-MCNC: 7.5 MG/DL (ref 7.8–10.44)
CHLORIDE SERPL-SCNC: 107 MMOL/L (ref 98–107)
CO2 SERPL-SCNC: 21 MMOL/L (ref 23–31)
CREAT CL PREDICTED SERPL C-G-VRATE: 16 ML/MIN (ref 70–130)
CRYSTAL-AUWI FLAG: 0.1 (ref 0–15)
EOSINOPHIL # BLD AUTO: 0 THOU/UL (ref 0–0.7)
EOSINOPHIL NFR BLD AUTO: 0.1 % (ref 0–10)
GLUCOSE SERPL-MCNC: 153 MG/DL (ref 83–110)
HCO3 BLDA-SCNC: 19.9 MEQ/L (ref 22–26)
HCT VFR BLDA CALC: 33 % (ref 36–47)
HEV IGM SER QL: 2.7 (ref 0–7.99)
HGB BLD-MCNC: 13.5 G/DL (ref 12–16)
HGB BLDA-MCNC: 11.1 G/DL (ref 12–16)
HYALINE CASTS #/AREA URNS LPF: (no result) LPF
LYMPHOCYTES # BLD: 1 THOU/UL (ref 1.2–3.4)
LYMPHOCYTES NFR BLD AUTO: 19.3 % (ref 21–51)
MAGNESIUM SERPL-MCNC: 1.8 MG/DL (ref 1.6–2.6)
MANUAL MICROSCOPIC REVIEWED?: (no result)
MCH RBC QN AUTO: 31 PG (ref 27–31)
MCV RBC AUTO: 93.4 FL (ref 81–99)
MONOCYTES # BLD AUTO: 0.5 THOU/UL (ref 0.11–0.59)
MONOCYTES NFR BLD AUTO: 9.4 % (ref 0–10)
NEUTROPHILS # BLD AUTO: 3.5 THOU/UL (ref 1.4–6.5)
NEUTROPHILS NFR BLD AUTO: 70.1 % (ref 42–75)
O2 A-A PPRESDIFF RESPIRATORY: 167.57 MM[HG] (ref 0–20)
PATHC CAST-AUWI FLAG: 4.2 (ref 0–2.49)
PCO2 BLDA: 31.3 MMHG (ref 35–45)
PH BLDA: 7.42 [PH] (ref 7.35–7.45)
PLATELET # BLD AUTO: 224 THOU/UL (ref 130–400)
PO2 BLDA: 149.8 MMHG (ref 80–100)
POTASSIUM SERPL-SCNC: 3.9 MMOL/L (ref 3.5–5.1)
PROT UR STRIP.AUTO-MCNC: 30 MG/DL
RBC # BLD AUTO: 4.35 MILL/UL (ref 4.2–5.4)
SODIUM SERPL-SCNC: 139 MMOL/L (ref 136–145)
SP GR UR STRIP: 1.02 (ref 1–1.04)
SPECIMEN DRAWN FROM PATIENT: (no result)
SPERM-AUWI FLAG: 0 (ref 0–9.9)
WBC # BLD AUTO: 5 THOU/UL (ref 4.8–10.8)
YEAST-AUWI FLAG: 33.8 (ref 0–25)

## 2018-02-15 RX ADMIN — CEFTRIAXONE SCH MLS: 1 INJECTION, POWDER, FOR SOLUTION INTRAMUSCULAR; INTRAVENOUS at 08:48

## 2018-02-15 RX ADMIN — HEPARIN SODIUM SCH UNITS: 5000 INJECTION, SOLUTION INTRAVENOUS; SUBCUTANEOUS at 21:32

## 2018-02-15 RX ADMIN — HYDROCORTISONE SODIUM SUCCINATE SCH MG: 100 INJECTION, POWDER, FOR SOLUTION INTRAMUSCULAR; INTRAVENOUS at 21:30

## 2018-02-15 RX ADMIN — CLINDAMYCIN PHOSPHATE SCH MLS: 600 INJECTION, SOLUTION INTRAVENOUS at 14:16

## 2018-02-15 RX ADMIN — HYDROCORTISONE SODIUM SUCCINATE SCH MG: 100 INJECTION, POWDER, FOR SOLUTION INTRAMUSCULAR; INTRAVENOUS at 09:04

## 2018-02-15 RX ADMIN — CLINDAMYCIN PHOSPHATE SCH MLS: 600 INJECTION, SOLUTION INTRAVENOUS at 05:28

## 2018-02-15 RX ADMIN — HEPARIN SODIUM SCH UNITS: 5000 INJECTION, SOLUTION INTRAVENOUS; SUBCUTANEOUS at 08:33

## 2018-02-15 RX ADMIN — HYDROCORTISONE SODIUM SUCCINATE SCH MG: 100 INJECTION, POWDER, FOR SOLUTION INTRAMUSCULAR; INTRAVENOUS at 14:17

## 2018-02-15 RX ADMIN — Medication SCH ML: at 09:04

## 2018-02-15 RX ADMIN — Medication SCH ML: at 21:32

## 2018-02-15 RX ADMIN — CLINDAMYCIN PHOSPHATE SCH MLS: 600 INJECTION, SOLUTION INTRAVENOUS at 21:35

## 2018-02-15 NOTE — OP
DATE OF PROCEDURE:  02/14/2018

 

PREOPERATIVE DIAGNOSES:  Small-bowel obstruction secondary to adhesions from prior surgery (prior his
tory of bowel resection), poor IV access.

 

POSTOPERATIVE DIAGNOSES:  Small-bowel obstruction secondary to adhesions from prior surgery (prior hi
story of bowel resection), poor IV access.

 

PROCEDURES PERFORMED:  Left subclavian vein central line.  Laparotomy with four hours of adhesiolysis
, two segments of small bowel resection, terminal ileum slightly more than 1 foot and jejunum approxi
mately 3 inches, closure of enterotomies, Seprafilm application to the pelvis and anterior abdominal 
wall, wound VAC application to skin and subcutaneous tissues, NG tube exchange.

 

SURGEON:  Dr. Irwin Car.

 

ANESTHESIA:  General.

 

BLOOD TRANSFUSED:  One unit of blood transfused.

 

PROCEDURE IN DETAIL:  The patient was taken to the operating room where under general anesthesia, lef
t periclavicular area and abdomen were prepared with ChloraPrep, draped in routine fashion.  Using st
erile technique and Seldinger technique, left subclavian vein central line placed, Biopatch applied. 
 Line secured with 3-0 silk suture.  Each port aspirated blood and flushed with saline solution.  Guilherme
rile dressing applied.

 

Abdomen was draped in routine fashion.  Incision was made in the midline and carried down through the
 skin and subcutaneous tissue and midline fascia.  There were extensive visceral adhesions, omental a
dhesions to the anterior abdominal wall and interbowel loop adhesions, 4-1/2 hours of adhesiolysis un
dertaken and there were severe adhesions of small bowel into the pelvis.  There was a complete bowel 
obstruction with dilatation of the proximal bowel and marked decompression and small ileum.  More talia
n 1 foot of ileum was resected due to severe adhesions.  There were multiple enterotomies in the segm
ent.  Mesentery divided between clamps and ligated with 2-0 silk ties.  A small bowel and small bowel
 anastomosis created with a staple technique, JOSE MANUEL-75 stapler, the enterotomy closed with a JOSE MANUEL staple
r.  Staple line reinforced with interrupted Lembert suture of 3-0 silk and mesentery closed with 2-0 
silk figure-of-eight.  More proximal jejunal resection undertaken due to adjacent enterotomies.  A si
milar anastomosis was performed.  Small bowel was run from the cecum proximally to the ligament of Tr
eitz, six times, all seromuscular tears were closed with 3-0 silk Lembert sutures.  Small bowel obey
nts were milked retrograde into the stomach.  NG tube had to be exchanged twice.  The patient's abdom
inal cavity was thoroughly irrigated with more than 5 liters of saline solution.  Irrigant evacuated.
  Good hemostasis noted.  Of note, the appendix is still present.  All instruments and needle counts 
were correct .  Seprafilm was placed in the pelvis the viscera and anterior abdominal wall and NG tub
e palpating good position prior to closures, the fascia approximated with #1 PDS.  Skin and subcutane
ous tissues irrigated, skin about the umbilicus closed with staples and wound VAC was applied.  The p
atient tolerated the procedure well and transferred to the ICU on the ventilator.

## 2018-02-15 NOTE — PRG
DATE OF SERVICE:  02/15/2018

 

SUBJECTIVE:  Ms. Edwards is an 89-year-old black female who was admitted for an acute abdomen.  She was
 found to have small-bowel obstruction.  She underwent an exploratory laparotomy yesterday for an adh
esiolysis - with 2 segments of small bowel resection and subsequent closure of enterostomies.  A woun
d VAC was also applied after the surgery.  Overnight, the patient noted to have decreased urine outpu
t.  She was aggressively volume repleted by her surgeon.  She has received several liters of fluid to
day.  Currently we are finishing normal saline to 100 mL per hour.  She has made some urine with this
.  I decreased the normal saline to 150 mL an hour for maintenance.  She is also getting salt poor al
bumin as needed.

 

PHYSICAL EXAMINATION: 

VITAL SIGNS:  Blood pressure is currently ranging from 99/53 to 110/61 with a heart rate of 115, O2 s
at 99%.

GENERAL:  The patient is intubated on ventilator support.  She is arousable, not in distress.

SKIN:  Adequate turgor. 

HEENT:  She has pinkish conjunctivae, anicteric sclerae.

NECK:  No neck mass, no carotid bruits, no JVD.

CHEST:  No deformities.

LUNGS:  Clear breath sounds.  No wheezing, no crackles.

HEART:  Normal sinus rhythm.  No murmur, no gallops, no rubs.

ABDOMEN:  Globular, soft, nontender, no masses.  She has a wound VAC in the abdomen.

EXTREMITIES:  No edema or deformities.  

 

MEDICATIONS:  02/15/2018 - Reviewed.

 

LABORATORY:  02/15/2018 - White count 5, hemoglobin 13.5, sodium 139, potassium 3.9, chloride 107, ca
rbon dioxide 21, BUN 72, creatinine 3.16, glucose 153, calcium 7.5.

 

ASSESSMENT AND PLAN:  

1.  Fluctuating creatinine - creatinine yesterday was noted at 2.90.  Currently, it is now 3.16.  Con
tinue to optimize hemodynamics.  Continue to maintain BP to at least greater than 90 systolic.  Clint
ntly on IV fluids, on low dose pressor support.  Also on salt poor albumin.  The plan is to give her 
salt poor albumin 25 grams IV q.6h. for a total of 3 days.  There is no indication for any dialytic i
ntervention.  I will be repeating another urinalysis and urine chemistries with this patient.  She mo
st likely has a simple prerenal azotemia.  

2.  Acute abdomen.  The patient has bowel obstruction and this has been surgically corrected by Dr. CRISTIAN bunch - he did an exploratory laparotomy with adhesiolysis.  

 

Overall, prognosis remains guarded.

## 2018-02-15 NOTE — CON
DATE OF CONSULTATION:  02/15/2018

 

HISTORY OF PRESENT ILLNESS:  Ms. Esthela Edwards is an 89-year-old female who was 
well known to me.  Dr. Car, General Surgery, took her to OR yesterday after 
she came in with bowel obstruction.  He lysed much of adhesions.  
Postoperatively, she was hypottensive and developed worsening renal failure 
prior from azotemia and shock.

 

She is now on the vent, intubated on Levophed and sedated .

 

She is also sedated eyes.

 

She was just recently discharged from the hospital with extensive history were 
outlined in multiple medical records.

 

Ms. Edwards has chronic crackles bilaterally suggestiveof interstitial lung 
disease.  She probably has evidence of some diastolic dysfunction.

 

She has a former history of smoker.

 

PAST MEDICAL HISTORY:  COPD, interstitial lung disease, diabetes, hypertension, 
previous PE, previous anxiety.

 

PAST SURGICAL HISTORY:  Extensively outlined include small bowel surgery,
hysterectomy, cataract, and arthroplasty.

 

HOME MEDICATIONS:  List of medicine from home includes Zanaflex,  amlodipine 5, 
gabapentin 300, Advair, Celexa 10, Coreg 12.5 three tablets a day, metformin 500
, hydralazine 75, fentanyl 50, Crestor, DuoNeb, insulin.

 

ALLERGIES:  PENICILLIN.

 

SOCIAL HISTORY:  Unremarkable.

 

FAMILY HISTORY:  Former smoker, smoked years ago.

 

REVIEW OF SYSTEMS:  Otherwise unobtainable, vented but negative.  I reviewed 
all medical records.

 

PHYSICAL EXAMINATION:

GENERAL:  Awake.

VITAL SIGNS:  Pulse 94, blood pressure 110/80, on Levophed, sats are 100%, 
respiration 14.

CHEST:  Reveals decreased breath sound without any wheezing.

CARDIAC:  Normal S1, S2, no gallops.

ABDOMEN:  Soft, no masses.

 

LABORATORY DATA AND IMAGING:  A pO2 149, [id number] ph 7.35 on present vent settings
, rate of14  White count 5000, H&H is 13 and 40, platelet count 224.  BUN and 
creatinine are 72 and 3.16; on 02/06/2018, they were normal.

   Normal chest x-ray, personally reviewed multiple, no acute infiltrates were 
seen.

 

He had a CT exam of the abdomen, which shows evidence of adhesions, small bowel 
obstructions.

 

IMPRESSION:

1.  Status post lap.

2.  Hypertension and shock, probably sepsis.

3.  Renal failure, prerenal.

4.  Diastolic dysfunction.

5.  Chronic obstructive pulmonary disease.

6.  History of lung disease.

7.  Diabetes.

8.  Previously, she did not want to be intubated, she was DNR.

 

PLAN:  I will discuss with family as they arrive.  In the meantime, I will 
start her back on neb treatments, steroids, antibiotics, clindamycin, Levaquin _
____ (03:15).

 

I may add some Maxipime to her present coverage.

 

Wean slowly.

 

Input from Renal, DVT prophylaxis.

 

We will follow.

 

Forty-Five minutes critical care time.

 

LAURENCE

## 2018-02-15 NOTE — PDOC.GSPN
Surgery Progress Note: Subj





- Subjective


Narrative: 





on pressors overnight.  more stable this am.  borderline UOP





Surgery Progress Note: Obj





- Vital signs


Vital signs: 


 Vital Signs - Most Recent











Temp Pulse Resp BP Pulse Ox


 


 100.5 F H  93   13   112/59 L  98 


 


 02/15/18 08:00  02/15/18 07:59  02/15/18 10:00  02/14/18 08:00  02/14/18 20:00














- Physical Exam


General: no distress, other (on vent)


Respiratory: coarse breath sounds


Abdomen: soft, distended, tender


Wound: wound vac





Surgery Progress Note: Results





- Labs


Result Diagrams: 


 02/15/18 04:00





 02/15/18 04:00


Lab results: 


 Laboratory Results - last 24 hr











  02/14/18 02/14/18 02/15/18





  17:30 23:28 03:30


 


WBC   


 


RBC   


 


Hgb   


 


Hct   


 


MCV   


 


MCH   


 


MCHC   


 


RDW   


 


Plt Count   


 


MPV   


 


Neutrophils %   


 


Lymphocytes %   


 


Monocytes %   


 


Eosinophils %   


 


Basophils %   


 


Neutrophils #   


 


Lymphocytes #   


 


Monocytes #   


 


Eosinophils #   


 


Basophils #   


 


Specimen Type   


 


Puncture Site   


 


Bicarbonate Actual   


 


ABG pH   


 


ABG pCO2   


 


ABG pO2   


 


ABG O2 Sat Calc/Ranulfo   


 


ABG O2 Content   


 


ABG Base Excess   


 


ABG Hematocrit   


 


ABG Hemoglobin   


 


ABG Oxyhemoglobin   


 


ABG Carboxyhemoglobin   


 


ABG Methemoglobin   


 


A-a O2 Gradient   


 


Ionized Calcium   


 


Mode of Support   


 


Mechanical Rate   


 


Inspired O2   


 


Tidal Volume   


 


Pressure Support   


 


PEEP or CPAP   


 


Sodium   


 


Potassium   


 


Chloride   


 


Carbon Dioxide   


 


Anion Gap   


 


BUN   


 


Creatinine   


 


Estimated GFR (MDRD)   


 


Glucose   


 


POC Glucose   209 H 


 


Hemoglobin A1c   


 


Calcium   


 


Phosphorus    3.8


 


Magnesium    1.8


 


Prealbumin   


 


Cortisol   


 


Blood Type  A POSITIVE  


 


Antibody Screen  NEGATIVE  


 


Crossmatch  See Detail  














  02/15/18 02/15/18 02/15/18





  03:30 04:00 04:00


 


WBC   


 


RBC   


 


Hgb   


 


Hct   


 


MCV   


 


MCH   


 


MCHC   


 


RDW   


 


Plt Count   


 


MPV   


 


Neutrophils %   


 


Lymphocytes %   


 


Monocytes %   


 


Eosinophils %   


 


Basophils %   


 


Neutrophils #   


 


Lymphocytes #   


 


Monocytes #   


 


Eosinophils #   


 


Basophils #   


 


Specimen Type   


 


Puncture Site   


 


Bicarbonate Actual   


 


ABG pH   


 


ABG pCO2   


 


ABG pO2   


 


ABG O2 Sat Calc/Ranulfo   


 


ABG O2 Content   


 


ABG Base Excess   


 


ABG Hematocrit   


 


ABG Hemoglobin   


 


ABG Oxyhemoglobin   


 


ABG Carboxyhemoglobin   


 


ABG Methemoglobin   


 


A-a O2 Gradient   


 


Ionized Calcium   


 


Mode of Support   


 


Mechanical Rate   


 


Inspired O2   


 


Tidal Volume   


 


Pressure Support   


 


PEEP or CPAP   


 


Sodium    139


 


Potassium    3.9


 


Chloride    107


 


Carbon Dioxide    21 L


 


Anion Gap    15


 


BUN    72 H


 


Creatinine    3.16 H


 


Estimated GFR (MDRD)    17


 


Glucose    153 H


 


POC Glucose   


 


Hemoglobin A1c  6.5 H  


 


Calcium    7.5 L


 


Phosphorus   


 


Magnesium   


 


Prealbumin   20.0 


 


Cortisol   


 


Blood Type   


 


Antibody Screen   


 


Crossmatch   














  02/15/18 02/15/18 02/15/18





  04:00 05:37 08:00


 


WBC  5.0  


 


RBC  4.35  


 


Hgb  13.5  


 


Hct  40.7  


 


MCV  93.4  


 


MCH  31.0  


 


MCHC  33.2  


 


RDW  13.8  


 


Plt Count  224  


 


MPV  9.2  


 


Neutrophils %  70.1  


 


Lymphocytes %  19.3 L  


 


Monocytes %  9.4  


 


Eosinophils %  0.1  


 


Basophils %  1.0  


 


Neutrophils #  3.5  


 


Lymphocytes #  1.0 L  


 


Monocytes #  0.5  


 


Eosinophils #  0.0  


 


Basophils #  0.0  


 


Specimen Type    ARTERIAL


 


Puncture Site    LINE


 


Bicarbonate Actual    19.9 L


 


ABG pH    7.42


 


ABG pCO2    31.3 L


 


ABG pO2    149.8 H


 


ABG O2 Sat Calc/Ranulfo    99.1


 


ABG O2 Content    15.5 L


 


ABG Base Excess    -3.7 L


 


ABG Hematocrit    33.0 L


 


ABG Hemoglobin    11.1 L


 


ABG Oxyhemoglobin    97.3 H


 


ABG Carboxyhemoglobin    1.0


 


ABG Methemoglobin    0.8


 


A-a O2 Gradient    167.575 H


 


Ionized Calcium    0.9 L


 


Mode of Support    SIMV


 


Mechanical Rate    14


 


Inspired O2    50


 


Tidal Volume    500


 


Pressure Support    10


 


PEEP or CPAP    5.0


 


Sodium    140


 


Potassium    4.2


 


Chloride    105


 


Carbon Dioxide   


 


Anion Gap   


 


BUN   


 


Creatinine   


 


Estimated GFR (MDRD)   


 


Glucose   


 


POC Glucose   129 H 


 


Hemoglobin A1c   


 


Calcium   


 


Phosphorus   


 


Magnesium   


 


Prealbumin   


 


Cortisol   


 


Blood Type   


 


Antibody Screen   


 


Crossmatch   














  02/15/18 02/15/18





  08:40 08:43


 


WBC  


 


RBC  


 


Hgb  


 


Hct  


 


MCV  


 


MCH  


 


MCHC  


 


RDW  


 


Plt Count  


 


MPV  


 


Neutrophils %  


 


Lymphocytes %  


 


Monocytes %  


 


Eosinophils %  


 


Basophils %  


 


Neutrophils #  


 


Lymphocytes #  


 


Monocytes #  


 


Eosinophils #  


 


Basophils #  


 


Specimen Type  


 


Puncture Site  


 


Bicarbonate Actual  


 


ABG pH  


 


ABG pCO2  


 


ABG pO2  


 


ABG O2 Sat Calc/Ranulfo  


 


ABG O2 Content  


 


ABG Base Excess  


 


ABG Hematocrit  


 


ABG Hemoglobin  


 


ABG Oxyhemoglobin  


 


ABG Carboxyhemoglobin  


 


ABG Methemoglobin  


 


A-a O2 Gradient  


 


Ionized Calcium  


 


Mode of Support  


 


Mechanical Rate  


 


Inspired O2  


 


Tidal Volume  


 


Pressure Support  


 


PEEP or CPAP  


 


Sodium  


 


Potassium  


 


Chloride  


 


Carbon Dioxide  


 


Anion Gap  


 


BUN  


 


Creatinine  


 


Estimated GFR (MDRD)  


 


Glucose  


 


POC Glucose   124 H


 


Hemoglobin A1c  


 


Calcium  


 


Phosphorus  


 


Magnesium  


 


Prealbumin  


 


Cortisol  21.70 


 


Blood Type  


 


Antibody Screen  


 


Crossmatch  














Surgery Progress Note: A/P





- Problem


(1) Small bowel obstruction


Current Visit: Yes   Code(s): K56.609 - UNSP INTESTNL OBST, UNSP AS TO PARTIAL 

VERSUS COMPLETE OBST   Status: Acute   


Assessment and Plan: 


POD 1 ex lap, yumi, small bowel resection.  cont ng to liws








(2) Chronic renal insufficiency


Current Visit: Yes   Code(s): N18.9 - CHRONIC KIDNEY DISEASE, UNSPECIFIED   

Status: Acute   


Assessment and Plan: 


renal function worsened slightly postop, expect improvement with rehydration

## 2018-02-15 NOTE — RAD
FRONTAL VIEW CHEST:

 

Date:  02/15/18 

 

COMPARISON:  

Previous day. 

 

INDICATION:

Ventilated patient. 

 

FINDINGS:

Supportive line and tubes are stable. No new consolidation. Cardiomediastinal silhouette is stable. 

 

IMPRESSION: 

Stable chest. 

 

 

POS: [facility]

## 2018-02-16 LAB
ANION GAP SERPL CALC-SCNC: 17 MMOL/L (ref 10–20)
BUN SERPL-MCNC: 53 MG/DL (ref 9.8–20.1)
CALCIUM SERPL-MCNC: 7.1 MG/DL (ref 7.8–10.44)
CHLORIDE SERPL-SCNC: 113 MMOL/L (ref 98–107)
CO2 SERPL-SCNC: 18 MMOL/L (ref 23–31)
CREAT CL PREDICTED SERPL C-G-VRATE: 27 ML/MIN (ref 70–130)
DOHLE BOD BLD QL SMEAR: SLIGHT
GLUCOSE SERPL-MCNC: 193 MG/DL (ref 83–110)
HGB BLD-MCNC: 11.4 G/DL (ref 12–16)
MCH RBC QN AUTO: 30.6 PG (ref 27–31)
MCV RBC AUTO: 94.1 FL (ref 81–99)
MDIFF COMPLETE?: YES
PLATELET # BLD AUTO: 171 THOU/UL (ref 130–400)
POTASSIUM SERPL-SCNC: 4.7 MMOL/L (ref 3.5–5.1)
RBC # BLD AUTO: 3.74 MILL/UL (ref 4.2–5.4)
SODIUM SERPL-SCNC: 143 MMOL/L (ref 136–145)
WBC # BLD AUTO: 11.4 THOU/UL (ref 4.8–10.8)

## 2018-02-16 RX ADMIN — CEFTRIAXONE SCH MLS: 1 INJECTION, POWDER, FOR SOLUTION INTRAMUSCULAR; INTRAVENOUS at 08:46

## 2018-02-16 RX ADMIN — INSULIN LISPRO PRN UNIT: 100 INJECTION, SOLUTION INTRAVENOUS; SUBCUTANEOUS at 18:23

## 2018-02-16 RX ADMIN — HYDROCORTISONE SODIUM SUCCINATE SCH MG: 100 INJECTION, POWDER, FOR SOLUTION INTRAMUSCULAR; INTRAVENOUS at 08:44

## 2018-02-16 RX ADMIN — Medication SCH ML: at 08:44

## 2018-02-16 RX ADMIN — DILTIAZEM HYDROCHLORIDE SCH MLS: 5 INJECTION INTRAVENOUS at 16:51

## 2018-02-16 RX ADMIN — HYDROCORTISONE SODIUM SUCCINATE SCH MG: 100 INJECTION, POWDER, FOR SOLUTION INTRAMUSCULAR; INTRAVENOUS at 02:23

## 2018-02-16 RX ADMIN — HEPARIN SODIUM SCH UNITS: 5000 INJECTION, SOLUTION INTRAVENOUS; SUBCUTANEOUS at 20:13

## 2018-02-16 RX ADMIN — HYDROCORTISONE SODIUM SUCCINATE SCH MG: 100 INJECTION, POWDER, FOR SOLUTION INTRAMUSCULAR; INTRAVENOUS at 13:30

## 2018-02-16 RX ADMIN — HEPARIN SODIUM SCH UNITS: 5000 INJECTION, SOLUTION INTRAVENOUS; SUBCUTANEOUS at 08:44

## 2018-02-16 RX ADMIN — HYDROCORTISONE SODIUM SUCCINATE SCH MG: 100 INJECTION, POWDER, FOR SOLUTION INTRAMUSCULAR; INTRAVENOUS at 20:13

## 2018-02-16 RX ADMIN — INSULIN LISPRO PRN UNIT: 100 INJECTION, SOLUTION INTRAVENOUS; SUBCUTANEOUS at 12:23

## 2018-02-16 RX ADMIN — CLINDAMYCIN PHOSPHATE SCH MLS: 600 INJECTION, SOLUTION INTRAVENOUS at 05:14

## 2018-02-16 RX ADMIN — Medication SCH ML: at 20:15

## 2018-02-16 RX ADMIN — NOREPINEPHRINE BITARTRATE PRN MLS: 1 INJECTION INTRAVENOUS at 01:00

## 2018-02-16 RX ADMIN — INSULIN LISPRO PRN UNIT: 100 INJECTION, SOLUTION INTRAVENOUS; SUBCUTANEOUS at 06:06

## 2018-02-16 NOTE — RAD
ONE VIEW CHEST:

 

COMPARISON: 

2/15/18.

 

HISTORY: 

Ventilated patient.  Respiratory distress.

 

FINDINGS: 

Redemonstration of an endotracheal tube, nasogastric tube, and left-sided central venous catheter.  S
table configuration of the cardiac silhouette.  Possible nodule in the right mid lung measuring 7 mm.
  Otherwise, lung parenchyma is unchanged.

 

IMPRESSION: 

Possible right lung nodule.

 

POS: [facility]

## 2018-02-16 NOTE — PRG
DATE OF SERVICE:  02/16/2018

 

This morning she is awake, alert, responsive on the vent.

 

PHYSICAL EXAMINATION: 

VITAL SIGNS:  Her blood pressure is in the low 110/49 on the A-line, 90 systolic, pulse 109, respirat
ions 25, sats 98%.

GENERAL:  She is awake, responsive, denies any pain.  I's & O's have been  6579 in, 1832 out.

CHEST:  Chest reveals decreased breath sounds with minimal crackles.

CARDIAC:  Normal S1, S2.

ABDOMEN:  Soft, no masses.

 

LABORATORY DATA:  White count 11,000, H&H 9 and 35, platelet count is 175, 63 bands, 26 neutrophils. 
 Chest x-ray shows slightly increased cephalization.  Creatinine is 1.94, improved from her previous 
creatinine 3.16.

 

IMPRESSION:

1.  Status post lap for small-bowel obstruction with adhesiolysis.

2.  Respiratory failure.

3.  Underlying chronic interstitial lung disease based on PFT.

4.  History of previous PE.

5.  Bandemia.

 

PLAN:  Continue antibiotics, clindamycin, Levaquin, ceftriaxone.

 

Neb treatments, supportive care, steroids.  We will try and wean and hopefully extubate.

 

Consider why the bandemia.  The abdomen is clearly soft.

 

I will follow.

 

One-half hour critical care time.

## 2018-02-16 NOTE — PDOC.GSPN
Surgery Progress Note: Subj





- Subjective


Narrative: 





Extubated.  Complaining of severe pain.  She takes daily Norco at home for 

chronic back pain.  OG tube out.  Not c/o nausea





Surgery Progress Note: Obj





- Vital signs


Vital signs: 


 Vital Signs - Most Recent











Temp Pulse Resp BP Pulse Ox


 


 100.5 F H  113 H  23 H  94/40 L  97 


 


 02/16/18 08:00  02/16/18 08:19  02/16/18 08:19  02/16/18 07:21  02/16/18 08:19














- Physical Exam


General: no distress


Cardiovascular: regular rate and rhythm


Respiratory: clear to auscultation


Abdomen: soft, decreased bowel sounds, distended


Wound: wound vac





Surgery Progress Note: Results





- Labs


Result Diagrams: 


 02/16/18 03:18





 02/16/18 03:18


Lab results: 


 Laboratory Results - last 24 hr











  02/14/18 02/16/18 02/16/18





  17:20 01:12 03:18


 


WBC   


 


RBC   


 


Hgb   


 


Hct   


 


MCV   


 


MCH   


 


MCHC   


 


RDW   


 


Plt Count   


 


MPV   


 


Neutrophils % (Manual)   


 


Band Neuts % (Manual)   


 


Lymphocytes % (Manual)   


 


Monocytes % (Manual)   


 


Metamyelocytes % (Man)   


 


WBC Morphology   


 


Dohle Bodies   


 


Sodium    143


 


Potassium    4.7


 


Chloride    113 H


 


Carbon Dioxide    18 L


 


Anion Gap    17


 


BUN    53 H


 


Creatinine    1.94 H


 


Estimated GFR (MDRD)    29


 


Glucose    193 H


 


POC Glucose  191 H  166 H 


 


Calcium    7.1 L














  02/16/18 02/16/18





  03:18 06:01


 


WBC  11.4 H 


 


RBC  3.74 L 


 


Hgb  11.4 L 


 


Hct  35.2 L 


 


MCV  94.1 


 


MCH  30.6 


 


MCHC  32.6 


 


RDW  13.6 


 


Plt Count  171 


 


MPV  9.5 


 


Neutrophils % (Manual)  26 L 


 


Band Neuts % (Manual)  63 H 


 


Lymphocytes % (Manual)  4 L 


 


Monocytes % (Manual)  2 


 


Metamyelocytes % (Man)  5 H 


 


WBC Morphology  SLIGHT 


 


Dohle Bodies  SLIGHT 


 


Sodium  


 


Potassium  


 


Chloride  


 


Carbon Dioxide  


 


Anion Gap  


 


BUN  


 


Creatinine  


 


Estimated GFR (MDRD)  


 


Glucose  


 


POC Glucose   189 H


 


Calcium  














Surgery Progress Note: A/P





- Problem


(1) Small bowel obstruction


Current Visit: Yes   Code(s): K56.609 - UNSP INTESTNL OBST, UNSP AS TO PARTIAL 

VERSUS COMPLETE OBST   Status: Acute   


Assessment and Plan: 


Extubated.  Will leave NG out for now but keep NPO.  Bandemia today.  Repeat CT 

tomorrow if it persists.  She will be better off with PCA given her chronic 

baseline pain








(2) Chronic renal insufficiency


Current Visit: Yes   Code(s): N18.9 - CHRONIC KIDNEY DISEASE, UNSPECIFIED   

Status: Acute

## 2018-02-16 NOTE — CON
DATE OF CONSULTATION:  02/16/2018

 

Time will be 30 minutes.

 

HISTORY OF PRESENT ILLNESS:  The patient is a pleasant 89-year-old woman with a 
history of mild coronary artery disease, who underwent surgery and was noted to 
have a rapid irregular heart rate.  The patient has a history of hypertension 
and diabetes mellitus.  She has a history of mild coronary artery disease.  She 
was in her usual state of health when she presented with abdominal discomfort.  
She underwent an emergent surgery.  Following the procedure, she is noted to 
have a regular heart rhythm.  The patient denies having any palpitations.  The 
patient denies having any chest discomfort.

 

PAST MEDICAL HISTORY:

1.  Coronary artery disease.

2.  Hypertension.

3.  Diabetes mellitus.

4.  Chronic obstructive pulmonary disease.

5.  Pulmonary embolus.

6.  Chronic back pain.

 

PAST SURGICAL HISTORY:  Hysterectomy, cholecystectomy, knee surgery, small 
bowel surgery, and  carpal tunnel surgery.

 

MEDICATIONS:  See nursing list.

 

ALLERGIES:  PENICILLIN.

 

SOCIAL HISTORY:  Nonsmoker.

 

PHYSICAL EXAMINATION

GENERAL:  Ill-appearing woman, in mild distress with a blood pressure 136/52.

NECK:  Full.

LUNGS:  Coarse breath sounds bilateral.

HEART:  Regular rate and rhythm, normal S1, S2.

ABDOMEN:  Distended with no bowel sounds.

EXTREMITIES:  Showed trace edema.

 

LABORATORY DATA:  White blood count 11.4, hemoglobin 11.4, hematocrit 35.4 and 
her platelets were 171.  Sodium was 143, potassium 4.7, chloride 113, 
bicarbonate 18, BUN 53, creatinine 1.94.  EKG revealed normal sinus rhythm with 
a normal ECG.  Her telemetry monitor revealed atrial fibrillation with a rapid 
ventricular response.

 

IMPRESSION:

1.  Paroxysmal atrial fibrillation.

2.  Status post colon surgery.

3.  History of mild coronary artery disease.

4.  Hypertension.

5.  Renal insufficiency.

6.  History of pulmonary embolism.

7.  Diabetes mellitus.

 

This patient presents with paroxysmal atrial fibrillation.  We will start her 
on IV Cardizem to control her heart rate.  We will follow this patient with you 
through her hospitalization.

 

LAURENCE

## 2018-02-16 NOTE — PQF
LILA HERNANDEZ RICHARD D MD

[id number]                                                             [id number]

[id number]                             

                                   

CLINICAL DOCUMENTATION IMPROVEMENT CLARIFICATION FORM:  ICD-10 Updated



PLEASE DO AN ADDENDUM TO THE PROGRESS NOTE WITH ANY DOCUMENTATION UPDATES OR 
ADDITIONS AND CARRY THROUGH TO DC SUMMARY.   THANK YOU.



DATE:   2-16-18                                    ATTN:



Please exercise your independent, professional judgment in responding to the 
clarification form. 

Clinical indicators are provided on the bottom of this form for your review



Please check appropriate box(s):



[  ] Hypovolemic Shock     

[  ] Septic Shock  

[  ] Hemorrhagic Shock due to surgery: LAPAROTOMY W/ 4 HRS OF ADHESIOLYSIS; 2 
SEGMENT OF SMALL BOWEL RESECTION

[  ] Shock Unspecified

[  ] Other diagnosis ___________

[  ] Unable to determine



In addition, please specify:

Present on Admission (POA):  [  ] Yes             [  ] No             [  ] 
Unable to determine



For continuity of documentation, please document condition throughout progress 
notes and discharge summary.  Thank You.





CLINICAL INDICATORS - SIGNS / SYMPTOMS / LABS



2-14 NEPHRO PN:  MARYLU

2-15 PULM CONSULT:  

POSTOPERATIVELY, SHE WAS HYPOTENSIVE AND DEVELOPED WORSENING RENAL FAILURE 
PRIOR FROM AZOTEMIA AND SHOCK



LABS:  BUN  CREAT    GFR      Hgb     Hct

2-13     73       3.27        16        11.6     35.8 

2-14     73       2.90        18        10.6     32.3



BP  2-14 @ 2007    73/56

       2-14 @ 2015    60/31

       2-14 @ 2030    91/47

       2-14 @ 2046     89/46 



RISK FACTORS

H&P:  SBO

OP NOTE:  LAPAROTOMY W/ 4 HRS OF ADHESIOLYSIS; 2 SEGMENT OF SMALL BOWEL RSECTION

                   550 EBL

PULM CONSULT:  PROBABLY SEPSIS





TREATMENTS:

ICU

VENTILATOR  2-14 @ 2018    OFF VENT  2-16 @ 0819

2 UPRBC 2-14



MAR:

NS @ 150 mls/hr   2-15 / 2-16

2-15 NS 2,000 mls/hr - BOLUS

ROCEPHIN IV 2-15 / 2-16

LEVAQUIN 2-14 / 2-16

NOREPINEPHRIN   2-15 / 2-16

CLEOCIN 2-14 / 2-15 /2-16



THANK YOU,

SUE



(This form is maintained as a part of the permanent medical record)

 2015 [employer].  All Rights Reserved

Sue Velez RN, BS    [email or web address]    Cell Phone:  [phone]

                                                              

MTDD

## 2018-02-16 NOTE — PRG
SUBJECTIVE:  Mr. Edwards is an 89-year-old black female who was admitted for acute abdomen secondary to
 a small-bowel obstruction - underwent exploratory laparotomy with relief of this mechanical obstruct
ion.  Adhesiolysis was done by Dr. Car.  We are following her up for acute kidney injury that was 
hemodynamically mediated.  She continues to have intermittent paroxysms of palpitations - ? of paroxy
smal atrial fibrillation.  A Cardiology consult has been done.

 

No new complaints today.

 

OBJECTIVE:

VITAL SIGNS:  Blood pressure 124/62, heart rate 107, respiratory rate 24, pulse ox 99%.

GENERAL:  Awake, alert, comfortable, not in distress.

SKIN:  Adequate turgor.

HEENT:  She has pinkish conjunctivae, anicteric sclerae.

NECK:  No neck mass.  No carotid bruits.  No JVD.

CHEST:  No deformities.

LUNGS:  Clear breath sounds.  No wheezing.  No crackles.

HEART:  Normal sinus rhythm.  No murmur, no gallops, no rubs.

ABDOMEN:  Globular, soft, nontender.  No masses.

LUNGS:  Decreased bowel sounds.

EXTREMITIES:  No edema.

 

MEDICATIONS:  Medications of 02/16/2018 was reviewed.

 

LABORATORY DATA:  Laboratories of 02/16/2018, white count 11.4, hemoglobin 11.4.  Sodium 143, potassi
um 4.7, chloride 113, carbon dioxide 18, BUN 53, creatinine 1.94, glucose 193, calcium 7.1.

 

ASSESSMENT AND PLAN:

1.  Acute kidney injury - hemodynamically mediated renal dysfunction.  Much improved renal function w
ith IV hydration.  The patient has been receiving crystalloids and IV infusion.  No indication for an
y dialytic intervention.

2.  Status post acute abdomen - surgical intervention has been done.  Dr. Car did an adhesiolysis.
  Overall, doing well.  We will be rechecking base met and CBC.  Please note again that there is no i
ndication for any dialytic intervention.

## 2018-02-17 LAB
ANION GAP SERPL CALC-SCNC: 13 MMOL/L (ref 10–20)
BUN SERPL-MCNC: 42 MG/DL (ref 9.8–20.1)
CALCIUM SERPL-MCNC: 7.7 MG/DL (ref 7.8–10.44)
CHLORIDE SERPL-SCNC: 116 MMOL/L (ref 98–107)
CO2 SERPL-SCNC: 22 MMOL/L (ref 23–31)
CREAT CL PREDICTED SERPL C-G-VRATE: 42 ML/MIN (ref 70–130)
GLUCOSE SERPL-MCNC: 159 MG/DL (ref 83–110)
HGB BLD-MCNC: 10.3 G/DL (ref 12–16)
MCH RBC QN AUTO: 31.1 PG (ref 27–31)
MCV RBC AUTO: 95.7 FL (ref 81–99)
MDIFF COMPLETE?: YES
PLATELET # BLD AUTO: 149 THOU/UL (ref 130–400)
POTASSIUM SERPL-SCNC: 4.1 MMOL/L (ref 3.5–5.1)
RBC # BLD AUTO: 3.32 MILL/UL (ref 4.2–5.4)
SODIUM SERPL-SCNC: 147 MMOL/L (ref 136–145)
WBC # BLD AUTO: 10.3 THOU/UL (ref 4.8–10.8)

## 2018-02-17 RX ADMIN — HYDROCORTISONE SODIUM SUCCINATE SCH MG: 100 INJECTION, POWDER, FOR SOLUTION INTRAMUSCULAR; INTRAVENOUS at 20:03

## 2018-02-17 RX ADMIN — INSULIN LISPRO PRN UNIT: 100 INJECTION, SOLUTION INTRAVENOUS; SUBCUTANEOUS at 22:17

## 2018-02-17 RX ADMIN — DILTIAZEM HYDROCHLORIDE SCH MLS: 5 INJECTION INTRAVENOUS at 16:48

## 2018-02-17 RX ADMIN — DILTIAZEM HYDROCHLORIDE SCH MLS: 5 INJECTION INTRAVENOUS at 03:54

## 2018-02-17 RX ADMIN — CEFTRIAXONE SCH MLS: 1 INJECTION, POWDER, FOR SOLUTION INTRAMUSCULAR; INTRAVENOUS at 09:07

## 2018-02-17 RX ADMIN — INSULIN LISPRO PRN UNIT: 100 INJECTION, SOLUTION INTRAVENOUS; SUBCUTANEOUS at 16:48

## 2018-02-17 RX ADMIN — HYDROCORTISONE SODIUM SUCCINATE SCH MG: 100 INJECTION, POWDER, FOR SOLUTION INTRAMUSCULAR; INTRAVENOUS at 09:08

## 2018-02-17 RX ADMIN — HYDROMORPHONE HYDROCHLORIDE PRN MG: 1 INJECTION, SOLUTION INTRAMUSCULAR; INTRAVENOUS; SUBCUTANEOUS at 11:38

## 2018-02-17 RX ADMIN — HYDROMORPHONE HYDROCHLORIDE PRN MG: 1 INJECTION, SOLUTION INTRAMUSCULAR; INTRAVENOUS; SUBCUTANEOUS at 13:58

## 2018-02-17 RX ADMIN — Medication SCH ML: at 09:10

## 2018-02-17 RX ADMIN — INSULIN LISPRO PRN UNIT: 100 INJECTION, SOLUTION INTRAVENOUS; SUBCUTANEOUS at 10:25

## 2018-02-17 RX ADMIN — HYDROCORTISONE SODIUM SUCCINATE SCH MG: 100 INJECTION, POWDER, FOR SOLUTION INTRAMUSCULAR; INTRAVENOUS at 02:21

## 2018-02-17 RX ADMIN — HEPARIN SODIUM SCH UNITS: 5000 INJECTION, SOLUTION INTRAVENOUS; SUBCUTANEOUS at 20:15

## 2018-02-17 RX ADMIN — HYDROMORPHONE HYDROCHLORIDE PRN MG: 1 INJECTION, SOLUTION INTRAMUSCULAR; INTRAVENOUS; SUBCUTANEOUS at 21:54

## 2018-02-17 RX ADMIN — HYDROMORPHONE HYDROCHLORIDE PRN MG: 1 INJECTION, SOLUTION INTRAMUSCULAR; INTRAVENOUS; SUBCUTANEOUS at 19:57

## 2018-02-17 RX ADMIN — Medication SCH ML: at 19:59

## 2018-02-17 RX ADMIN — HEPARIN SODIUM SCH UNITS: 5000 INJECTION, SOLUTION INTRAVENOUS; SUBCUTANEOUS at 09:09

## 2018-02-17 RX ADMIN — HYDROMORPHONE HYDROCHLORIDE PRN MG: 1 INJECTION, SOLUTION INTRAMUSCULAR; INTRAVENOUS; SUBCUTANEOUS at 16:47

## 2018-02-17 RX ADMIN — HYDROCORTISONE SODIUM SUCCINATE SCH MG: 100 INJECTION, POWDER, FOR SOLUTION INTRAMUSCULAR; INTRAVENOUS at 15:13

## 2018-02-17 NOTE — RAD
CHEST 1 VIEW:

 

HISTORY: 

Dyspnea.  Followup.

 

COMPARISON: 

2/16/18.

 

FINDINGS: 

Cardiac silhouette is magnified by projection.  Pulmonary vasculature is more engorged.  Mediastinum 
is midline with aortic calcification.  Left subclavian central venous catheter remains in place.  Braulio
ogastric tube and endotracheal catheter are no longer visible.  

 

Nodule projecting over the right base on the previous exam is no longer evident.  Cardiac monitor marcella
ds overlie the chest.

 

IMPRESSION: 

1.  Interval removal of the endotracheal catheter and nasogastric tube.

 

2.  Slight interval increase in pulmonary vascular congestion.

 

POS: [facility]

## 2018-02-17 NOTE — PRG
DATE OF SERVICE:  02/17/2018

 

SUBJECTIVE:  Ms. Edwards is still having some abdominal discomfort.  She is unable to manage a PCA pump
.

 

OBJECTIVE:

VITAL SIGNS:  She is afebrile, heart rate is 90, blood pressure 136/47.  Oximetry is 96 on 2 liters.

LUNGS:  Clear.

HEART:  Regular rhythm.

ABDOMEN:  Soft and diffusely mildly tender.

 

LABORATORY DATA:  Hemoglobin is 10.3, white count is 10.3, platelets 149.  Sodium 147, potassium 4.1,
 chloride 116, bicarbonate 22, BUN 42, creatinine 1.33, glucose 159.

 

IMPRESSION:  Status post surgery for a small-bowel obstruction.  Pain meds are being adjusted.  She w
ill remain in the critical care unit for now.

## 2018-02-17 NOTE — PDOC.GSPN
Surgery Progress Note: Subj





- Subjective


Narrative: 





Pain control issues.  Not able to push PCA button.  No flatus, or bowel movement





Surgery Progress Note: Obj





- Vital signs


Vital signs: 


 Vital Signs - Most Recent











Temp Pulse Resp BP Pulse Ox


 


 98.3 F   95   22 H  146/57 H  98 


 


 02/17/18 07:20  02/17/18 08:44  02/17/18 07:10  02/17/18 08:44  02/17/18 08:44














- Physical Exam


General: no distress


Cardiovascular: regular rate and rhythm


Respiratory: clear to auscultation


Abdomen: soft, decreased bowel sounds, distended


Wound: wound vac





Surgery Progress Note: Results





- Labs


Result Diagrams: 


 02/17/18 03:40





 02/17/18 03:40


Lab results: 


 Laboratory Results - last 24 hr











  02/16/18 02/17/18 02/17/18





  22:20 03:40 03:40


 


WBC    10.3


 


RBC    3.32 L


 


Hgb    10.3 L


 


Hct    31.8 L


 


MCV    95.7


 


MCH    31.1 H


 


MCHC    32.5


 


RDW    13.8


 


Plt Count    149


 


MPV    9.2


 


Neutrophils % (Manual)    68


 


Band Neuts % (Manual)    25 H


 


Lymphocytes % (Manual)    5 L


 


Monocytes % (Manual)    2


 


Sodium   147 H 


 


Potassium   4.1 


 


Chloride   116 H 


 


Carbon Dioxide   22 L 


 


Anion Gap   13 


 


BUN   42 H 


 


Creatinine   1.33 H 


 


Estimated GFR (MDRD)   45 


 


Glucose   159 H 


 


POC Glucose  124 H  


 


Calcium   7.7 L 














  02/17/18





  03:41


 


WBC 


 


RBC 


 


Hgb 


 


Hct 


 


MCV 


 


MCH 


 


MCHC 


 


RDW 


 


Plt Count 


 


MPV 


 


Neutrophils % (Manual) 


 


Band Neuts % (Manual) 


 


Lymphocytes % (Manual) 


 


Monocytes % (Manual) 


 


Sodium 


 


Potassium 


 


Chloride 


 


Carbon Dioxide 


 


Anion Gap 


 


BUN 


 


Creatinine 


 


Estimated GFR (MDRD) 


 


Glucose 


 


POC Glucose  148 H


 


Calcium 














Surgery Progress Note: A/P





- Problem


(1) Small bowel obstruction


Current Visit: Yes   Code(s): K56.609 - UNSP INTESTNL OBST, UNSP AS TO PARTIAL 

VERSUS COMPLETE OBST   Status: Acute   


Assessment and Plan: 


Still awaiting bowel function.  Anesthesia to write for IV dilaudid and will 

replace fentanyl patch.  She has severe chronic back pain on narcotics at home. 








(2) Chronic renal insufficiency


Current Visit: Yes   Code(s): N18.9 - CHRONIC KIDNEY DISEASE, UNSPECIFIED   

Status: Acute

## 2018-02-17 NOTE — PRG
DATE OF SERVICE:  02/17/2018

 

RENAL MEDICINE

 

SUBJECTIVE:  Mr. Edwards is an 89-year-old black female who was admitted for acute abdomen and small-bernarda
wel obstruction status post exploratory laparotomy and we are following this patient for acute kidney
 injury that was hemodynamically mediated.  Renal function has is much improved with volume repletion
.  She is currently extubated.

 

No acute events noted last night.

 

PHYSICAL EXAMINATION:

VITAL SIGNS:  Blood pressure 154/63, heart rate 91, respiratory rate is 12, pulse ox 96%.

GENERAL:  Awake, comfortable, not in distress.

SKIN:  Adequate turgor.

HEENT:  She has pinkish conjunctivae, anicteric sclerae.

NECK:  No neck mass, no carotid bruits, no JVD.

CHEST:  No deformities.

LUNGS:  Clear breath sounds.  No wheezing, no crackles.

HEART:  Normal sinus rhythm.  No murmur, no gallops or rubs.

ABDOMEN:  Globular, soft, nontender, no masses.  Positive for surgical wound.

EXTREMITIES:  No edema, no deformities.

 

MEDICATIONS:  Of 02/17/2018 reviewed.

 

LABORATORY DATA:  Of 02/17/2008, white count 10.3, hemoglobin 10.3, hematocrit 31.8, Sodium 147, pota
ssium 4.1, chloride 116, carbon dioxide 22, BUN 42, creatinine 1.33, glucose 159, and calcium 7.7.

 

ASSESSMENT AND PLAN:

1.  Acute kidney injury - hemodynamically mediated renal dysfunction.  Renal function is much improve
d.  Most recent creatinine is now 1.33.  Continue current management.  Continue gentle volume repleti
on.  No indication for any dialytic intervention.

2.  Mild hypernatremia, change IV fluid to D5 half normal at 100 mL per hour.

3.  Acute abdominal obstruction status post exploratory laparotomy.  Surgery is following.  The patie
nt is doing well.

## 2018-02-18 LAB
ANION GAP SERPL CALC-SCNC: 13 MMOL/L (ref 10–20)
BUN SERPL-MCNC: 35 MG/DL (ref 9.8–20.1)
CALCIUM SERPL-MCNC: 8.7 MG/DL (ref 7.8–10.44)
CHLORIDE SERPL-SCNC: 115 MMOL/L (ref 98–107)
CO2 SERPL-SCNC: 23 MMOL/L (ref 23–31)
CREAT CL PREDICTED SERPL C-G-VRATE: 52 ML/MIN (ref 70–130)
GLUCOSE SERPL-MCNC: 255 MG/DL (ref 83–110)
HGB BLD-MCNC: 10.6 G/DL (ref 12–16)
MCH RBC QN AUTO: 31.2 PG (ref 27–31)
MCV RBC AUTO: 95.5 FL (ref 81–99)
MDIFF COMPLETE?: YES
PLATELET # BLD AUTO: 158 THOU/UL (ref 130–400)
POTASSIUM SERPL-SCNC: 3.5 MMOL/L (ref 3.5–5.1)
RBC # BLD AUTO: 3.41 MILL/UL (ref 4.2–5.4)
SODIUM SERPL-SCNC: 147 MMOL/L (ref 136–145)
WBC # BLD AUTO: 12.1 THOU/UL (ref 4.8–10.8)

## 2018-02-18 RX ADMIN — HYDROMORPHONE HYDROCHLORIDE PRN MG: 1 INJECTION, SOLUTION INTRAMUSCULAR; INTRAVENOUS; SUBCUTANEOUS at 00:40

## 2018-02-18 RX ADMIN — DILTIAZEM HYDROCHLORIDE SCH MLS: 5 INJECTION INTRAVENOUS at 21:12

## 2018-02-18 RX ADMIN — HYDROMORPHONE HYDROCHLORIDE PRN MG: 1 INJECTION, SOLUTION INTRAMUSCULAR; INTRAVENOUS; SUBCUTANEOUS at 08:21

## 2018-02-18 RX ADMIN — HYDROMORPHONE HYDROCHLORIDE PRN MG: 1 INJECTION, SOLUTION INTRAMUSCULAR; INTRAVENOUS; SUBCUTANEOUS at 03:14

## 2018-02-18 RX ADMIN — HYDROMORPHONE HYDROCHLORIDE PRN MG: 1 INJECTION, SOLUTION INTRAMUSCULAR; INTRAVENOUS; SUBCUTANEOUS at 15:09

## 2018-02-18 RX ADMIN — HYDROCORTISONE SODIUM SUCCINATE SCH MG: 100 INJECTION, POWDER, FOR SOLUTION INTRAMUSCULAR; INTRAVENOUS at 15:01

## 2018-02-18 RX ADMIN — Medication SCH ML: at 08:22

## 2018-02-18 RX ADMIN — HEPARIN SODIUM SCH UNITS: 5000 INJECTION, SOLUTION INTRAVENOUS; SUBCUTANEOUS at 21:13

## 2018-02-18 RX ADMIN — INSULIN LISPRO PRN UNIT: 100 INJECTION, SOLUTION INTRAVENOUS; SUBCUTANEOUS at 04:07

## 2018-02-18 RX ADMIN — HEPARIN SODIUM SCH UNITS: 5000 INJECTION, SOLUTION INTRAVENOUS; SUBCUTANEOUS at 08:20

## 2018-02-18 RX ADMIN — HYDROCORTISONE SODIUM SUCCINATE SCH MG: 100 INJECTION, POWDER, FOR SOLUTION INTRAMUSCULAR; INTRAVENOUS at 08:19

## 2018-02-18 RX ADMIN — CEFTRIAXONE SCH MLS: 1 INJECTION, POWDER, FOR SOLUTION INTRAMUSCULAR; INTRAVENOUS at 08:22

## 2018-02-18 RX ADMIN — HYDROMORPHONE HYDROCHLORIDE PRN MG: 1 INJECTION, SOLUTION INTRAMUSCULAR; INTRAVENOUS; SUBCUTANEOUS at 11:48

## 2018-02-18 RX ADMIN — INSULIN LISPRO PRN UNIT: 100 INJECTION, SOLUTION INTRAVENOUS; SUBCUTANEOUS at 10:23

## 2018-02-18 RX ADMIN — HYDROMORPHONE HYDROCHLORIDE PRN MG: 1 INJECTION, SOLUTION INTRAMUSCULAR; INTRAVENOUS; SUBCUTANEOUS at 05:23

## 2018-02-18 RX ADMIN — HYDROMORPHONE HYDROCHLORIDE PRN MG: 1 INJECTION, SOLUTION INTRAMUSCULAR; INTRAVENOUS; SUBCUTANEOUS at 10:50

## 2018-02-18 RX ADMIN — DILTIAZEM HYDROCHLORIDE SCH MLS: 5 INJECTION INTRAVENOUS at 08:22

## 2018-02-18 RX ADMIN — HYDROCORTISONE SODIUM SUCCINATE SCH MG: 100 INJECTION, POWDER, FOR SOLUTION INTRAMUSCULAR; INTRAVENOUS at 02:32

## 2018-02-18 RX ADMIN — HYDROCORTISONE SODIUM SUCCINATE SCH MG: 100 INJECTION, POWDER, FOR SOLUTION INTRAMUSCULAR; INTRAVENOUS at 21:13

## 2018-02-18 RX ADMIN — Medication SCH ML: at 21:13

## 2018-02-18 NOTE — RAD
CHEST 1 VIEW:

 

HISTORY: 

Dyspnea.  Followup.

 

COMPARISON: 

2/17/18.

 

FINDINGS: 

Cardiac silhouette is magnified by projection.  Pulmonary vasculature remains engorged and accentuate
d by shallow inspiration.  Mediastinum is midline with aortic calcification and a left subclavian raz
tral venous catheter.  No evidence of pneumothorax.  Cardiac monitor leads overlie the chest.

 

IMPRESSION: 

Mild pulmonary vascular congestion appears stable compared to the previous exam.

 

POS: [facility]

## 2018-02-18 NOTE — PRG
DATE OF SERVICE:  02/18/2018

 

SERVICE:  Renal Medicine.

 

SUBJECTIVE:  Ms. Edwards is an 89-year-old black female who was seen for an acute kidney injury that wa
s hemodynamically mediated renal dysfunction.  Renal function is much improved.  It is now near bever[place]
l.  She received IV hydration.  She also was admitted for an acute abdomen.  She was found to have ob
struction and this was surgically relieved by an exploratory laparotomy by Dr. Car.  No other comp
laints.  She feels thirsty.  She is still on n.p.o.

 

PHYSICAL EXAMINATION:

VITAL SIGNS:  Blood pressure is noted at 130/53, heart rate 93, respiratory rate 25, pulse ox 95%.

GENERAL:  Awake, alert, comfortable, not in distress.

SKIN:  Adequate turgor.

HEENT:  Pinkish conjunctivae, anicteric sclerae.

NECK:  No neck mass, no carotid bruits, no JVD.

CHEST:  No deformities.

LUNGS:  Decreased breath sounds.

HEART:  Normal sinus rhythm.  No murmur, no gallops, no rubs.

ABDOMEN:  Globular, soft, nontender, no masses.

EXTREMITIES:  No edema.

 

MEDICATIONS:  Of 02/18/2018 was reviewed.

 

LABORATORY DATA:  Of 02/18/2018, white count 12.1, hemoglobin 10.6, sodium 147, potassium is 3.5, chl
oride 105, carbon dioxide 23, BUN 35, creatinine 1.1, calcium 8.7.

 

ASSESSMENT AND PLAN:

1.  Acute kidney injury - hemodynamically mediated renal dysfunction.  Much improved renal function w
ith IV hydration.  Please note, renal function is now within normal.  Due to the much improved renal 
dysfunction, we will be signing off.

2.  Mild hypernatremia, on D5 half normal - continue increasing free water intake.

 

Overall, agree with current management.

## 2018-02-18 NOTE — PRG
DATE OF SERVICE:  02/18/2018

 

SUBJECTIVE:  Ms. Edwards is doing well, although she moans in pain.  She says she is doing well.

 

She is scheduled to transfer out to the floor.

 

OBJECTIVE:

VITAL SIGNS:  Actually do not reflect that she is in any distress.  Heart rate is in the 80s, blood p
ressure 130/53 and respiratory rate is in the low 20s.  Oximetry is 94% on cannula oxygen.

LUNGS:  Clear.

HEART:  Regular rhythm.

ABDOMEN:  Soft.

EXTREMITIES:  Without asymmetry or edema.

 

LABORATORY DATA:  White count is 12.1, hemoglobin is 10.6 and platelets 158,000.  Sodium 147, potassi
um 3.5, chloride 115, bicarb 23, BUN 35, creatinine 1.1 and glucose 255.

 

IMPRESSION:  Status post laparotomy for bowel obstruction.  She is tentatively scheduled to move to t
 surgery for.  She does not appear to be critically ill and this seems reasonable at this time.

## 2018-02-18 NOTE — PDOC.GSPN
Surgery Progress Note: Subj





- Subjective


Narrative: 





Pain seems under better control now.  No flatus or bowel movement yet.  Still 

confused





Surgery Progress Note: Obj





- Vital signs


Vital signs: 


 Vital Signs - Most Recent











Temp Pulse Resp BP Pulse Ox


 


 98.1 F   86   24 H  146/57 H  95 


 


 02/18/18 07:00  02/18/18 07:18  02/18/18 07:18  02/17/18 08:44  02/18/18 01:05














- Physical Exam


General: other (Confused)


Cardiovascular: regular rate and rhythm


Respiratory: clear to auscultation


Abdomen: soft, decreased bowel sounds, distended


Wound: wound vac





Surgery Progress Note: Results





- Labs


Result Diagrams: 


 02/18/18 04:25





 02/18/18 04:25


Lab results: 


 Laboratory Results - last 24 hr











  02/17/18 02/18/18 02/18/18





  22:17 04:04 04:25


 


WBC   


 


RBC   


 


Hgb   


 


Hct   


 


MCV   


 


MCH   


 


MCHC   


 


RDW   


 


Plt Count   


 


MPV   


 


Neutrophils % (Manual)   


 


Band Neuts % (Manual)   


 


Lymphocytes % (Manual)   


 


Sodium    147 H


 


Potassium    3.5


 


Chloride    115 H


 


Carbon Dioxide    23


 


Anion Gap    13


 


BUN    35 H


 


Creatinine    1.10


 


Estimated GFR (MDRD)    57


 


Glucose    255 H


 


POC Glucose  210 H  215 H 


 


Calcium    8.7














  02/18/18





  04:25


 


WBC  12.1 H


 


RBC  3.41 L


 


Hgb  10.6 L


 


Hct  32.6 L


 


MCV  95.5


 


MCH  31.2 H


 


MCHC  32.7


 


RDW  14.0


 


Plt Count  158


 


MPV  9.4


 


Neutrophils % (Manual)  87 H


 


Band Neuts % (Manual)  6


 


Lymphocytes % (Manual)  7 L


 


Sodium 


 


Potassium 


 


Chloride 


 


Carbon Dioxide 


 


Anion Gap 


 


BUN 


 


Creatinine 


 


Estimated GFR (MDRD) 


 


Glucose 


 


POC Glucose 


 


Calcium 














Surgery Progress Note: A/P





- Problem


(1) Small bowel obstruction


Current Visit: Yes   Code(s): K56.609 - UNSP INTESTNL OBST, UNSP AS TO PARTIAL 

VERSUS COMPLETE OBST   Status: Acute   


Assessment and Plan: 


Kidney function normalized


Await bowel function.  will allow water, ice


Transfer to [facility]








(2) Chronic renal insufficiency


Current Visit: Yes   Code(s): N18.9 - CHRONIC KIDNEY DISEASE, UNSPECIFIED   

Status: Acute

## 2018-02-19 LAB
ANION GAP SERPL CALC-SCNC: 11 MMOL/L (ref 10–20)
BUN SERPL-MCNC: 28 MG/DL (ref 9.8–20.1)
CALCIUM SERPL-MCNC: 9.1 MG/DL (ref 7.8–10.44)
CHLORIDE SERPL-SCNC: 116 MMOL/L (ref 98–107)
CO2 SERPL-SCNC: 25 MMOL/L (ref 23–31)
CREAT CL PREDICTED SERPL C-G-VRATE: 59 ML/MIN (ref 70–130)
GLUCOSE SERPL-MCNC: 334 MG/DL (ref 83–110)
HGB BLD-MCNC: 10.2 G/DL (ref 12–16)
MCH RBC QN AUTO: 30.1 PG (ref 27–31)
MCV RBC AUTO: 95.7 FL (ref 81–99)
MDIFF COMPLETE?: YES
PLATELET # BLD AUTO: 165 THOU/UL (ref 130–400)
POTASSIUM SERPL-SCNC: 3.5 MMOL/L (ref 3.5–5.1)
RBC # BLD AUTO: 3.4 MILL/UL (ref 4.2–5.4)
SODIUM SERPL-SCNC: 148 MMOL/L (ref 136–145)
WBC # BLD AUTO: 7.1 THOU/UL (ref 4.8–10.8)

## 2018-02-19 RX ADMIN — INSULIN LISPRO PRN UNIT: 100 INJECTION, SOLUTION INTRAVENOUS; SUBCUTANEOUS at 17:26

## 2018-02-19 RX ADMIN — INSULIN LISPRO PRN UNIT: 100 INJECTION, SOLUTION INTRAVENOUS; SUBCUTANEOUS at 23:37

## 2018-02-19 RX ADMIN — HYDROMORPHONE HYDROCHLORIDE PRN MG: 1 INJECTION, SOLUTION INTRAMUSCULAR; INTRAVENOUS; SUBCUTANEOUS at 12:35

## 2018-02-19 RX ADMIN — HYDROCORTISONE SODIUM SUCCINATE SCH MG: 100 INJECTION, POWDER, FOR SOLUTION INTRAMUSCULAR; INTRAVENOUS at 17:26

## 2018-02-19 RX ADMIN — Medication SCH ML: at 22:42

## 2018-02-19 RX ADMIN — HYDROCORTISONE SODIUM SUCCINATE SCH MG: 100 INJECTION, POWDER, FOR SOLUTION INTRAMUSCULAR; INTRAVENOUS at 20:20

## 2018-02-19 RX ADMIN — Medication SCH ML: at 09:12

## 2018-02-19 RX ADMIN — HYDROMORPHONE HYDROCHLORIDE PRN MG: 1 INJECTION, SOLUTION INTRAMUSCULAR; INTRAVENOUS; SUBCUTANEOUS at 10:10

## 2018-02-19 RX ADMIN — INSULIN LISPRO PRN UNIT: 100 INJECTION, SOLUTION INTRAVENOUS; SUBCUTANEOUS at 03:53

## 2018-02-19 RX ADMIN — HYDROCORTISONE SODIUM SUCCINATE SCH MG: 100 INJECTION, POWDER, FOR SOLUTION INTRAMUSCULAR; INTRAVENOUS at 03:46

## 2018-02-19 RX ADMIN — DEXTROSE MONOHYDRATE SCH MLS: 70 INJECTION, SOLUTION INTRAVENOUS at 22:42

## 2018-02-19 RX ADMIN — HEPARIN SODIUM SCH UNITS: 5000 INJECTION, SOLUTION INTRAVENOUS; SUBCUTANEOUS at 09:12

## 2018-02-19 RX ADMIN — DILTIAZEM HYDROCHLORIDE SCH MLS: 5 INJECTION INTRAVENOUS at 09:12

## 2018-02-19 RX ADMIN — HYDROCORTISONE SODIUM SUCCINATE SCH MG: 100 INJECTION, POWDER, FOR SOLUTION INTRAMUSCULAR; INTRAVENOUS at 09:12

## 2018-02-19 RX ADMIN — CEFTRIAXONE SCH MLS: 1 INJECTION, POWDER, FOR SOLUTION INTRAMUSCULAR; INTRAVENOUS at 09:11

## 2018-02-19 RX ADMIN — HEPARIN SODIUM SCH UNITS: 5000 INJECTION, SOLUTION INTRAVENOUS; SUBCUTANEOUS at 20:20

## 2018-02-19 RX ADMIN — INSULIN LISPRO PRN UNIT: 100 INJECTION, SOLUTION INTRAVENOUS; SUBCUTANEOUS at 12:41

## 2018-02-19 NOTE — PRG
DATE OF SERVICE:  02/19/2018

 

This morning she is awake, alert, responsive.  She is having abdominal pain.  

 

PHYSICAL EXAMINATION: 

VITAL SIGNS:  Sats are 97% on 2 liters, respirations 18, temperature 99, blood pressure 160/53.  

 

KUB shows a large area of air in the gastric area.

 

CHEST:  Chest reveals decreased breath sounds without any wheezing.

CARDIAC:  Normal S1, S2, no gallops.

ABDOMEN:  Distended, but soft.

 

IMPRESSION:

1.  Status post small-bowel obstruction, status post surgery.

2.  Ongoing ileus.

3.  Renal failure, resolved.

4.  Diabetes mellitus.

5.  Chronic obstructive pulmonary disease.

 

PLAN:  Antibiotics.  I am going to decrease the steroids.  Relatively her adrenal insufficiency is im
proved.  

 

Continue PT.  Await input from General Surgery.

## 2018-02-19 NOTE — PDOC.GSPN
Surgery Progress Note: Subj





- Subjective


Narrative: 





Vomitted overnight.  NG placed and replaced when she pulled it out.  Confused





Surgery Progress Note: Obj





- Vital signs


Vital signs: 


 Vital Signs - Most Recent











Temp Pulse Resp BP Pulse Ox


 


 99.0 F   87   18   169/69 H  96 


 


 02/19/18 07:35  02/19/18 07:35  02/19/18 07:35  02/19/18 07:35  02/19/18 07:35














- Physical Exam


General: no distress


Cardiovascular: regular rate and rhythm


Respiratory: clear to auscultation


Abdomen: decreased bowel sounds, appropriately tender, distended


Wound: wound vac





Surgery Progress Note: Results





- Labs


Result Diagrams: 


 02/18/18 04:25





 02/19/18 06:00


Lab results: 


 Laboratory Results - last 24 hr











  02/19/18 02/19/18 02/19/18





  00:01 03:51 06:00


 


Sodium    148 H


 


Potassium    3.5


 


Chloride    116 H


 


Carbon Dioxide    25


 


Anion Gap    11


 


BUN    28 H


 


Creatinine    0.96


 


Estimated GFR (MDRD)    66


 


Glucose    334 H


 


POC Glucose  268 H  290 H 


 


Calcium    9.1














Surgery Progress Note: A/P





- Problem


(1) Small bowel obstruction


Current Visit: Yes   Code(s): K56.609 - UNSP INTESTNL OBST, UNSP AS TO PARTIAL 

VERSUS COMPLETE OBST   Status: Acute   


Assessment and Plan: 


Ileus.  NG placed.  Check placement with KUB.  Start TPN








(2) Chronic renal insufficiency


Current Visit: Yes   Code(s): N18.9 - CHRONIC KIDNEY DISEASE, UNSPECIFIED   

Status: Acute

## 2018-02-19 NOTE — RAD
AP VIE WOF THE ABDOMEN:

 

INDICATION: 

NG tube placement.

 

COMPARISON: 

Abdominal radiograph dated 2/14/08 and CT abdomen and pelvis dated 2/13/18.

 

FINDINGS: There prominent gaseous distention of the stomach.  The small bowel appears nondistended.  
Surgical clips overlying the lower midline abdomen.  A gastric catheter projects in the region of the
 proximal body.  Cholecystectomy clips are seen within the right upper quadrant of the abdomen.  Lung
 bases are clear.  Scattered degenerative changes are seen involving the thoracolumbar spine and face
t joints.

 

IMPRESSION: 

1.  Nasogastric tube tip seen over the region of the proximal gastric body.

 

2.  Prominent gaseous distention of the stomach may be related to postop ileus or possible gastropare
sis.  Followup is recommended.

 

POS: [facility]

## 2018-02-20 RX ADMIN — HYDROCORTISONE SODIUM SUCCINATE SCH MG: 100 INJECTION, POWDER, FOR SOLUTION INTRAMUSCULAR; INTRAVENOUS at 20:40

## 2018-02-20 RX ADMIN — HYDROMORPHONE HYDROCHLORIDE PRN MG: 1 INJECTION, SOLUTION INTRAMUSCULAR; INTRAVENOUS; SUBCUTANEOUS at 10:12

## 2018-02-20 RX ADMIN — Medication SCH ML: at 20:33

## 2018-02-20 RX ADMIN — INSULIN LISPRO PRN UNIT: 100 INJECTION, SOLUTION INTRAVENOUS; SUBCUTANEOUS at 23:22

## 2018-02-20 RX ADMIN — HYDROCORTISONE SODIUM SUCCINATE SCH MG: 100 INJECTION, POWDER, FOR SOLUTION INTRAMUSCULAR; INTRAVENOUS at 03:17

## 2018-02-20 RX ADMIN — HYDROCORTISONE SODIUM SUCCINATE SCH MG: 100 INJECTION, POWDER, FOR SOLUTION INTRAMUSCULAR; INTRAVENOUS at 15:34

## 2018-02-20 RX ADMIN — DEXTROSE MONOHYDRATE SCH MLS: 70 INJECTION, SOLUTION INTRAVENOUS at 23:13

## 2018-02-20 RX ADMIN — HYDROMORPHONE HYDROCHLORIDE PRN MG: 1 INJECTION, SOLUTION INTRAMUSCULAR; INTRAVENOUS; SUBCUTANEOUS at 00:42

## 2018-02-20 RX ADMIN — INSULIN LISPRO PRN UNIT: 100 INJECTION, SOLUTION INTRAVENOUS; SUBCUTANEOUS at 04:18

## 2018-02-20 RX ADMIN — Medication SCH ML: at 09:36

## 2018-02-20 RX ADMIN — INSULIN LISPRO PRN UNIT: 100 INJECTION, SOLUTION INTRAVENOUS; SUBCUTANEOUS at 11:56

## 2018-02-20 RX ADMIN — HEPARIN SODIUM SCH UNITS: 5000 INJECTION, SOLUTION INTRAVENOUS; SUBCUTANEOUS at 09:22

## 2018-02-20 RX ADMIN — Medication PRN ML: at 05:22

## 2018-02-20 RX ADMIN — CEFTRIAXONE SCH MLS: 1 INJECTION, POWDER, FOR SOLUTION INTRAMUSCULAR; INTRAVENOUS at 09:38

## 2018-02-20 RX ADMIN — HYDROCORTISONE SODIUM SUCCINATE SCH MG: 100 INJECTION, POWDER, FOR SOLUTION INTRAMUSCULAR; INTRAVENOUS at 09:22

## 2018-02-20 NOTE — PRG
DATE OF SERVICE:  02/20/2018

 

This morning appears to be somewhat encephalopathic, but in no distress.  

 

PHYSICAL EXAMINATION: 

VITAL SIGNS:  Sats 99% on 2 liters, respirations 20, temperature 98, blood pressure 189/84.

NEURO:  Awake, responsive.

CHEST:  Chest reveals decreased breath sounds, no wheezing.

CARDIAC:  Normal S1, S2. 

ABDOMEN:  Soft, no masses.

 

LABORATORY DATA:  White count 7000, H&H 10 and 32.  Blood sugars are slightly elevated.

 

IMPRESSION:

1.  Status post lap ileus. 

2.  Chronic obstructive pulmonary disease.  

3.  Respiratory failure. 

4.  Diastolic dysfunction.

 

PLAN:  She is on TPN.  Continue supportive care.  

 

Her x-ray is improved.

 

Will go ahead and decrease steroids.

 

Hopefully, when  GI tract starts working will start some nutrition.

 

I will follow.

## 2018-02-20 NOTE — PRG
DATE OF SERVICE:  02/20/2018

 

SUBJECTIVE:  Esthela Edwards has been moved to the ICU to the [facility].  The patient underwent, 02/14/2018,
 4-hour laparotomy, adhesiolysis, 2 small bowel resections and anastomosis for small-bowel obstructio
n.  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  Unfo
rtunately, when she is extubated, NG tube was removed.  It was expected that she would have a prolong
ed ileus.  She required replacement of another NG tube with more than 2 liters of gastric contents as
pirated.  The patient at home prior to the surgery did have a history of mild dementia and sundowning
.  She was in rehabilitation prior to this admission.  The patient has suffered confusion and the fam
lexis is concerned.  Prior to her surgery, she is a DNR and options given for palliative hospice measur
es versus operative intervention, they chose the latter.  The patient postoperatively has not been ou
t of bed.  Physical therapy orders had been written, but have been discontinued.  As I enter the room
 this morning, the patient has an NG tube in place and the family is present.  Nasogastric output the
 last 24 hour is 3.35 liters.  Urine output 1.625 liters with a Moran catheter in place.  She has sof
t restraints to prevent her from remove her NG tube.

 

LABORATORY DATA:

White count is 7, hemoglobin 10.2.  Sodium yesterday 148, potassium 3.5, chloride 116, BUN 28, creati
nine 0.96, GFR 66 recovering from her acute kidney injury.  Accu-Cheks 300-295-365 on TPN.  Hemoglobi
n A1c perioperatively 6.5.  Magnesium, phosphorus, last checked 02/15/2018 were normal.  Phosphorus 3
.8, magnesium 1.8.  Prealbumin preoperatively 20, cortisol level 21.  Last chest x-ray 02/18/2018, evi vicente vascular congestion, no change.  Abdominal x-ray yesterday, NG tube seen over the region of th
e proximal gastric body, changes consistent with ileus.  Personal review of the NG tube yesterday rev
eals it is in good position, but there is marked gastric distention.

 

OBJECTIVE:

LUNGS:  Clear to auscultation, no wheezing.

CARDIAC:  Regular rate and rhythm.

ABDOMEN:  Soft, nondistended.  Occasional bowel sounds.

VITAL SIGNS:  99.4 degrees, 106, respiratory rate 24, 170/70.

EXTREMITIES:  Wound VAC midline wound.  Extremities unremarkable, no edema.

 

ASSESSMENT AND PLAN:

1.  The patient has not been out of bed postoperatively.  I have discussed this with the family and e
mphasized the need for mobility and up out of bed.  Physical therapy orders have fallen off the chart
 somehow and we will renew those.  She was ambulating and working with physical therapy and rehabilit
ation prior to this event and would work towards ambulation again.  I personally with Salvatore, her nurse
 assistant, and another nurse, got her out of bed into a chair today.  She did partially weight bear 
very limited, but was able to take a few steps and stand with assistance.  She was very cooperative. 
 We will order physical therapy and continue to get her out of bed 2-3 times a day, staying in a john
r, out of bed for 2-3 hours at a time as tolerated.  She has severe weakening and will need extensive
 therapy, would recommend twice a day.  We would recommend nursing get her out of bed in the morning 
and possibly in afternoon as able and not rely on physical therapy to get her into a chair.

2.  Ileus.  Continue NG tube, ice chips, gum and hard candy as able.  Soft restraints to allow NG tub
e to be in place.  Await GI function.  I did hear one bowel sound on abdominal auscultation.  No flat
us or stool today.

3.  Malnutrition.  Continue TP.

4.  Open wound, skin and subcutaneous tissue.  View wound tomorrow during the VAC change.

5.  Check electrolytes.

6.  We will likely adjust her TPN for elevated glucose tomorrow.

7.  Dementia.  The patient has mild dementia and has confusion.  I have reassured the family, this is
 normal finding postoperatively and it should improve with time.  We would try to avoid narcotics and
 sedatives.  We would try to improve her mobility today during the day so that she might sleep better
 during the night.

8.  Continue Moran at this time.

9.  Continue DNR status with efforts to improve her.

10.  Deep venous thrombosis prophylaxis, now that her renal function has improved, discontinued subcu
 heparin and institute Lovenox.

11.  As her GI function resumes in the future, we would plan to send her to rehabilitation again if p
ossible.

## 2018-02-21 LAB
ANION GAP SERPL CALC-SCNC: 14 MMOL/L (ref 10–20)
BUN SERPL-MCNC: 36 MG/DL (ref 9.8–20.1)
CALCIUM SERPL-MCNC: 9.7 MG/DL (ref 7.8–10.44)
CHLORIDE SERPL-SCNC: 109 MMOL/L (ref 98–107)
CO2 SERPL-SCNC: 32 MMOL/L (ref 23–31)
CREAT CL PREDICTED SERPL C-G-VRATE: 57 ML/MIN (ref 70–130)
GLUCOSE SERPL-MCNC: 555 MG/DL (ref 83–110)
HGB BLD-MCNC: 12 G/DL (ref 12–16)
HGB BLD-MCNC: 12.2 G/DL (ref 12–16)
MAGNESIUM SERPL-MCNC: 1.6 MG/DL (ref 1.6–2.6)
MCH RBC QN AUTO: 30.6 PG (ref 27–31)
MCH RBC QN AUTO: 31.3 PG (ref 27–31)
MCV RBC AUTO: 93.8 FL (ref 81–99)
MCV RBC AUTO: 94 FL (ref 81–99)
MDIFF COMPLETE?: YES
MDIFF COMPLETE?: YES
PLATELET # BLD AUTO: 202 THOU/UL (ref 130–400)
PLATELET # BLD AUTO: 208 THOU/UL (ref 130–400)
PLATELET BLD QL SMEAR: (no result)
POTASSIUM SERPL-SCNC: 2.8 MMOL/L (ref 3.5–5.1)
RBC # BLD AUTO: 3.83 MILL/UL (ref 4.2–5.4)
RBC # BLD AUTO: 3.98 MILL/UL (ref 4.2–5.4)
SODIUM SERPL-SCNC: 152 MMOL/L (ref 136–145)
TARGETS BLD QL SMEAR: (no result) (100X)
TOXIC GRANULES BLD QL SMEAR: SLIGHT
WBC # BLD AUTO: 7.2 THOU/UL (ref 4.8–10.8)
WBC # BLD AUTO: 9.9 THOU/UL (ref 4.8–10.8)

## 2018-02-21 RX ADMIN — INSULIN LISPRO PRN UNIT: 100 INJECTION, SOLUTION INTRAVENOUS; SUBCUTANEOUS at 05:16

## 2018-02-21 RX ADMIN — Medication SCH ML: at 08:41

## 2018-02-21 RX ADMIN — METRONIDAZOLE SCH MLS: 500 INJECTION, SOLUTION INTRAVENOUS at 13:20

## 2018-02-21 RX ADMIN — HYDROCORTISONE SODIUM SUCCINATE SCH MG: 100 INJECTION, POWDER, FOR SOLUTION INTRAMUSCULAR; INTRAVENOUS at 15:11

## 2018-02-21 RX ADMIN — INSULIN LISPRO PRN UNIT: 100 INJECTION, SOLUTION INTRAVENOUS; SUBCUTANEOUS at 17:35

## 2018-02-21 RX ADMIN — HYDROCORTISONE SODIUM SUCCINATE SCH MG: 100 INJECTION, POWDER, FOR SOLUTION INTRAMUSCULAR; INTRAVENOUS at 08:41

## 2018-02-21 RX ADMIN — MORPHINE SULFATE PRN MG: 10 INJECTION, SOLUTION INTRAMUSCULAR; INTRAVENOUS at 17:28

## 2018-02-21 RX ADMIN — INSULIN LISPRO PRN UNIT: 100 INJECTION, SOLUTION INTRAVENOUS; SUBCUTANEOUS at 08:44

## 2018-02-21 RX ADMIN — Medication PRN ML: at 08:42

## 2018-02-21 RX ADMIN — DILTIAZEM HYDROCHLORIDE SCH MLS: 5 INJECTION INTRAVENOUS at 17:13

## 2018-02-21 RX ADMIN — CEFTRIAXONE SCH MLS: 1 INJECTION, POWDER, FOR SOLUTION INTRAMUSCULAR; INTRAVENOUS at 08:41

## 2018-02-21 RX ADMIN — POTASSIUM CHLORIDE PRN MLS: 29.8 INJECTION, SOLUTION INTRAVENOUS at 08:34

## 2018-02-21 RX ADMIN — METRONIDAZOLE SCH MLS: 500 INJECTION, SOLUTION INTRAVENOUS at 22:45

## 2018-02-21 RX ADMIN — INSULIN LISPRO PRN UNIT: 100 INJECTION, SOLUTION INTRAVENOUS; SUBCUTANEOUS at 12:37

## 2018-02-21 RX ADMIN — Medication SCH ML: at 20:40

## 2018-02-21 RX ADMIN — INSULIN LISPRO PRN UNIT: 100 INJECTION, SOLUTION INTRAVENOUS; SUBCUTANEOUS at 22:52

## 2018-02-21 RX ADMIN — HYDROCORTISONE SODIUM SUCCINATE SCH MG: 100 INJECTION, POWDER, FOR SOLUTION INTRAMUSCULAR; INTRAVENOUS at 02:16

## 2018-02-21 RX ADMIN — HYDROCORTISONE SODIUM SUCCINATE SCH MG: 100 INJECTION, POWDER, FOR SOLUTION INTRAMUSCULAR; INTRAVENOUS at 20:41

## 2018-02-21 NOTE — PRG
DATE OF SERVICE:  02/21/2018

 

Esthela Edwards is an 89-year-old female, who this morning is doing well.  NG tube had to be replaced l
ast night because she removed it.  It  is at 55 cm.  The patient is conversive, but confused as she h
as been.

 

PHYSICAL EXAMINATION: 

VITAL SIGNS:  Temperature 98.3 degrees, heart rate 135, respiratory rate 20, O2 sats 96%, 191/73.

LUNGS:  Clear to auscultation.

CARDIAC:  Sinus tachycardia.

ABDOMEN:  Soft, no bowel sounds.  NG tube output last 24 hours reported at 800 mL, relative to output
 24 hours prior 3.3 liters and 2.2 liters the day prior.  Urine output recorded 4400 mL.  

 

LABORATORY:  This morning her sodium is elevated to 152, relative to 2 days ago 148, potassium 2.8, c
arbon dioxide 32, BUN 36, creatinine 0.97, GFR 65, Accu-Cheks, however are 387 to 436.  White count i
s 7.2, hemoglobin 12.0, elevated from yesterday 10.2, 61% neutrophils, 10% bands, which is normal.

 

PLAN:

1.  Ileus persists without bowel sounds, but NG tube output is diminished.  This is encouraging.  We 
will obtain abdominal x-ray to assure NG tube is in proper position and may need to be advanced. An 1
8-Faroese NG tube was placed, replacing the dislodged one.  We will obtain abdominal x-ray to assure p
usha positioning, continue n.p.o. status except for ice chips.

2.  Hypokalemia, replaced.

3.  Elevated glucoses, will adjust TPN for elevated sodium, elevated Accu-Cheks, add insulin to the T
PN.

4.  Borderline low magnesium, low dose magnesium replacement.

5.  Deconditioning.  Will need aggressive physical therapy and out of bed.

6.  Open wound abdomen.  This was inspected, the upper half of the wound open is granulating, the low
er half is less granulating and does have a foul smell, yet there is no enteric drainage and inspecti
on of the canister reveals absence of any significant canister drainage.  Continue to monitor the wou
nd.

7.  DNR status.  Continue all other measures for care other than CPR, defibrillation.  

 

Condition discussed with the family yesterday.  Family is not present this morning.

## 2018-02-21 NOTE — PRG
DATE OF SERVICE:  02/21/2018

 

Unfortunately this morning she is encephalopathic.  Confused. 

 

PHYSICAL EXAMINATION: 

VITAL SIGNS:  Her sats are 95% on room air, pulse 105, respirations 20, blood pressure 182/69.

CHEST:  Chest revealed decreased breath sounds without wheezing.

CARDIAC:  Normal S1-S2.  No gallops.

ABDOMEN:  Soft, no masses.

 

IMPRESSION:

1.  Status post lap for small bowel ileus.

2.  Encephalopathy.

3.  Respiratory failure.

 

PLAN:  Continue antibiotics.  Continue NG.  TPN and supportive care.  Prognosis is guarded.  She is a
 DNR.

## 2018-02-21 NOTE — RAD
PORTABLE CHEST ONE VIEW:

 

Date: 2-21-18 

Time: 8:41 a.m.

 

History: COPD.

 

FINDINGS/IMPRESSION: 

Comparison is made with exam of 2-18-18. 

 

Left sided central line remains in place. Gastric tube with tip in the direction of the stomach. The 
heart size is stable. No confluent areas of consolidation, pneumothorax, holly pulmonary edema or ple
ural effusions are seen. 

 

POS: [facility]

## 2018-02-21 NOTE — RAD
ABDOMEN ONE VIEW:

 

History: 

89-year-old female with history of NG tube placement for position evaluation. 

 

Comparison: 

2-19-18

 

FINDINGS: 

An NG tube is noted with the tip and side hole extending into the stomach. The previously noted marke
dly dilated stomach has considerably decompressed. Right parasagittal surgical clips are noted. There
 is prominent right hemidiaphragm elevation with some minimal bibasilar parenchymal changes. There is
 minimal gas and fecal material in the colon. 

 

IMPRESSION: 

NG tube in satisfactory location. Considerable decompression of the dilated stomach when compared to 
the prior study. 

 

POS: [facility]

## 2018-02-22 LAB
ANION GAP SERPL CALC-SCNC: 12 MMOL/L (ref 10–20)
BASOPHILS # BLD AUTO: 0 THOU/UL (ref 0–0.2)
BASOPHILS NFR BLD AUTO: 0.1 % (ref 0–1)
BUN SERPL-MCNC: 47 MG/DL (ref 9.8–20.1)
CALCIUM SERPL-MCNC: 9.3 MG/DL (ref 7.8–10.44)
CHLORIDE SERPL-SCNC: 111 MMOL/L (ref 98–107)
CO2 SERPL-SCNC: 33 MMOL/L (ref 23–31)
CREAT CL PREDICTED SERPL C-G-VRATE: 56 ML/MIN (ref 70–130)
EOSINOPHIL # BLD AUTO: 0 THOU/UL (ref 0–0.7)
EOSINOPHIL NFR BLD AUTO: 0.2 % (ref 0–10)
GLUCOSE SERPL-MCNC: 411 MG/DL (ref 83–110)
HGB BLD-MCNC: 12 G/DL (ref 12–16)
LYMPHOCYTES # BLD: 1.2 THOU/UL (ref 1.2–3.4)
LYMPHOCYTES NFR BLD AUTO: 10.7 % (ref 21–51)
MCH RBC QN AUTO: 30.7 PG (ref 27–31)
MCV RBC AUTO: 94.6 FL (ref 81–99)
MONOCYTES # BLD AUTO: 0.7 THOU/UL (ref 0.11–0.59)
MONOCYTES NFR BLD AUTO: 6.2 % (ref 0–10)
NEUTROPHILS # BLD AUTO: 9.4 THOU/UL (ref 1.4–6.5)
NEUTROPHILS NFR BLD AUTO: 82.7 % (ref 42–75)
PLATELET # BLD AUTO: 203 THOU/UL (ref 130–400)
POTASSIUM SERPL-SCNC: 3 MMOL/L (ref 3.5–5.1)
RBC # BLD AUTO: 3.92 MILL/UL (ref 4.2–5.4)
SODIUM SERPL-SCNC: 153 MMOL/L (ref 136–145)
WBC # BLD AUTO: 11.4 THOU/UL (ref 4.8–10.8)

## 2018-02-22 RX ADMIN — INSULIN LISPRO PRN UNIT: 100 INJECTION, SOLUTION INTRAVENOUS; SUBCUTANEOUS at 09:26

## 2018-02-22 RX ADMIN — DILTIAZEM HYDROCHLORIDE SCH MLS: 5 INJECTION INTRAVENOUS at 20:45

## 2018-02-22 RX ADMIN — INSULIN LISPRO PRN UNIT: 100 INJECTION, SOLUTION INTRAVENOUS; SUBCUTANEOUS at 06:13

## 2018-02-22 RX ADMIN — POTASSIUM CHLORIDE PRN MLS: 29.8 INJECTION, SOLUTION INTRAVENOUS at 06:42

## 2018-02-22 RX ADMIN — INSULIN LISPRO PRN UNIT: 100 INJECTION, SOLUTION INTRAVENOUS; SUBCUTANEOUS at 17:57

## 2018-02-22 RX ADMIN — DILTIAZEM HYDROCHLORIDE SCH MLS: 5 INJECTION INTRAVENOUS at 05:07

## 2018-02-22 RX ADMIN — INSULIN LISPRO PRN UNIT: 100 INJECTION, SOLUTION INTRAVENOUS; SUBCUTANEOUS at 12:01

## 2018-02-22 RX ADMIN — HYDROCORTISONE SODIUM SUCCINATE SCH MG: 100 INJECTION, POWDER, FOR SOLUTION INTRAMUSCULAR; INTRAVENOUS at 02:21

## 2018-02-22 RX ADMIN — METRONIDAZOLE SCH MLS: 500 INJECTION, SOLUTION INTRAVENOUS at 05:07

## 2018-02-22 RX ADMIN — Medication SCH ML: at 20:41

## 2018-02-22 RX ADMIN — METRONIDAZOLE SCH MLS: 500 INJECTION, SOLUTION INTRAVENOUS at 22:36

## 2018-02-22 RX ADMIN — HYDROCORTISONE SODIUM SUCCINATE SCH MG: 100 INJECTION, POWDER, FOR SOLUTION INTRAMUSCULAR; INTRAVENOUS at 20:39

## 2018-02-22 RX ADMIN — HYDROCORTISONE SODIUM SUCCINATE SCH MG: 100 INJECTION, POWDER, FOR SOLUTION INTRAMUSCULAR; INTRAVENOUS at 09:07

## 2018-02-22 RX ADMIN — CEFTRIAXONE SCH MLS: 1 INJECTION, POWDER, FOR SOLUTION INTRAMUSCULAR; INTRAVENOUS at 09:06

## 2018-02-22 RX ADMIN — Medication SCH ML: at 09:08

## 2018-02-22 RX ADMIN — HYDROCORTISONE SODIUM SUCCINATE SCH MG: 100 INJECTION, POWDER, FOR SOLUTION INTRAMUSCULAR; INTRAVENOUS at 17:57

## 2018-02-22 RX ADMIN — METRONIDAZOLE SCH MLS: 500 INJECTION, SOLUTION INTRAVENOUS at 13:48

## 2018-02-22 NOTE — PRG
DATE OF SERVICE:  02/22/2018

 

SUBJECTIVE:  Ms. Esthela Edwards looks better than the last time I around on her.

 

OBJECTIVE:

VITAL SIGNS:  Heart rate 82, blood pressure 149/57.  She is afebrile, respiratory rate is 18-20, oxim
etry is 96 on room air.

LUNGS:  Clear.

HEART:  Regular rhythm.

ABDOMEN:  Nontender.

 

LABORATORY DATA:  White count 11.4, hemoglobin 12.0, platelets 203,000.

 

Sodium 153, potassium 3, chloride 111, bicarbonate 33, BUN 47, creatinine 0.9, glucose has been in 30
0-400 range.

 

IMPRESSION:

1.  Status post laparotomy.

2.  Mild hyperchloremia.

3.  Mild prerenal azotemia.

4.  Ileus with NG tube still in place with green drainage from her NG tube.  She is eating ice chips 
occasionally.

5.  Acute on chronic kidney disease.

6.  Severe deconditioning.

 

PLAN:  We will continue with supportive care.  The deconditioning is probably the biggest factor mari scott down the road as to whether or not she survives long term.  She could very easily end up, complete
ly bedridden, requiring 2-3 people to care for moving forward.  She is DO NOT RESUSCITATE patient, wh
ich is appropriate.  I met with the family and answered all their questions.

## 2018-02-22 NOTE — PQF
LILA HERNANDEZ RICHARD D MD

[id number]                                                             CCU-[id number]

[id number]                             

                                   

CLINICAL DOCUMENTATION IMPROVEMENT CLARIFICATION FORM:  ICD-10 Updated



PLEASE DO AN ADDENDUM TO THE PROGRESS NOTE WITH ANY DOCUMENTATION UPDATES OR 
ADDITIONS AND CARRY THROUGH TO DC SUMMARY.   THANK YOU.



DATE:   2-16-18                                    ATTN:   TILA



Please exercise your independent, professional judgment in responding to the 
clarification form. 

Clinical indicators are provided on the bottom of this form for your review





Please check appropriate box(s):



[  ] Hypovolemic Shock     

[  ] Septic Shock  

[  ] Hemorrhagic Shock due to surgery: LAPAROTOMY W/ 4 HRS OF ADHESIOLYSIS; 2 
SEGMENT OF

        SMALL BOWEL RESECTION

[  ] Shock Unspecified

[  ] Other diagnosis _____________________

[  ] Unable to determine



In addition, please specify:

Present on Admission (POA):  [  ] Yes             [  ] No             [  ] 
Unable to determine



For continuity of documentation, please document condition throughout progress 
notes and discharge summary.  Thank You.





CLINICAL INDICATORS - SIGNS / SYMPTOMS / LABS



2-14 NEPHRO PN:  MARYLU

2-15 PULM CONSULT:  

POSTOPERATIVELY, SHE WAS HYPOTENSIVE AND DEVELOPED WORSENING RENAL FAILURE 
PRIOR FROM AZOTEMIA AND SHOCK



LABS:  BUN  CREAT    GFR     Hgb    Hct

2-13     73       3.27        16        11.6    35.8 

2-14     73       2.90        18        10.6    32.3



BP  2-14 @ 2007    73/56

       2-14 @ 2015    60/31

       2-14 @ 2030    91/47

       2-14 @ 2046     89/46 



RISK FACTORS

H&P:  SBO

OP NOTE:  LAPAROTOMY W/ 4 HRS OF ADHESIOLYSIS; 2 SEGMENT OF SMALL BOWEL RSECTION

                   550 EBL

PULM CONSULT:  PROBABLY SEPSIS





TREATMENTS:

ICU

VENTILATOR  2-14 @ 2018    OFF VENT  2-16 @ 0819

2 UPRBC 2-14



MAR:

NS @ 150 mls/hr   2-15 / 2-16

2-15 NS 2,000 mls/hr - BOLUS

ROCEPHIN IV 2-15 / 2-16

LEVAQUIN 2-14 / 2-16

NOREPINEPHRIN   2-15 / 2-16

CLEOCIN 2-14 / 2-15 /2-16



THANK YOU,

SUE



(This form is maintained as a part of the permanent medical record)

 2015 [employer].  All Rights Reserved

Sue Velez, RN, BS    [email or web address]    Cell Phone:  [phone]

                                                              

[facility]

## 2018-02-22 NOTE — PRG
DATE OF SERVICE:  02/22/2018

 

SUBJECTIVE:  Esthela Edwards is in the IMCU.  She continues to be almost a total transfer and left.  
She does partially stand, but is very weak.  Nursing has been doing an excellent job sitting in a kyle
ir 3-4 times per day.  The patient is still confused.

 

OBJECTIVE:

VITAL SIGNS:  Temperature 97.8, pulse 82, blood pressure 145/52.  NG tube output last 24 hours 1350, 
urine output 2400.

LUNGS:  Clear to auscultation.

CARDIAC:  Regular rate and rhythm.  Rate better controlled.

ABDOMEN:  Rare bowel sounds, soft.  No stool.  No flatus.

EXTREMITIES:  Unremarkable.  Wound VAC in place.

 

LABORATORY DATA:  Sodium 153, potassium 3.0, chloride 111, carbon dioxide 33, BUN 47, creatinine 0.9.
  Glucose is Accu-Cheks 380 to 413 with 2 units of regular insulin per 100 mL of TPN.  White count 11
, hemoglobin 12, platelet count 203,000.

 

ASSESSMENT AND PLAN:

1.  Ileus after 4-hour adhesiolysis and bowel resection.  Continue TPN, NG tube, n.p.o.

2.  On TPN, low potassium replaced, increase potassium and TPN.

3.  Hypernatremia.  Removed sodium from TPN.  There is very little currently.

4.  Acute kidney injury, resolved.  Kidney function essentially normal with slightly increased BUN an
d slightly increased hemoglobin observed.  Urine output is very good.

5.  Severe deconditioning.

6.  Partially open wound, skin and subcutaneous tissue, wound VAC, view wound tomorrow.

7.  Postoperatively, the patient did not have IV fluids for 12 hours and was in hypovolemic shock, on
 pressors.  I spoke to the nurse [phone] finding out that she is on pressors and she was given 2 saline 
boluses and high rate IV fluids to rehydrate her and pressors were weaned.  The problem is CPOE issue
s in computer entry and lack of communication.

## 2018-02-23 LAB
ANION GAP SERPL CALC-SCNC: 12 MMOL/L (ref 10–20)
BUN SERPL-MCNC: 69 MG/DL (ref 9.8–20.1)
CALCIUM SERPL-MCNC: 8.9 MG/DL (ref 7.8–10.44)
CHLORIDE SERPL-SCNC: 111 MMOL/L (ref 98–107)
CO2 SERPL-SCNC: 32 MMOL/L (ref 23–31)
CREAT CL PREDICTED SERPL C-G-VRATE: 55 ML/MIN (ref 70–130)
GLUCOSE SERPL-MCNC: 264 MG/DL (ref 83–110)
MAGNESIUM SERPL-MCNC: 2.1 MG/DL (ref 1.6–2.6)
POTASSIUM SERPL-SCNC: 3.6 MMOL/L (ref 3.5–5.1)
SODIUM SERPL-SCNC: 151 MMOL/L (ref 136–145)

## 2018-02-23 RX ADMIN — MORPHINE SULFATE PRN MG: 10 INJECTION, SOLUTION INTRAMUSCULAR; INTRAVENOUS at 08:14

## 2018-02-23 RX ADMIN — Medication SCH ML: at 08:24

## 2018-02-23 RX ADMIN — METRONIDAZOLE SCH MLS: 500 INJECTION, SOLUTION INTRAVENOUS at 05:44

## 2018-02-23 RX ADMIN — METRONIDAZOLE SCH MLS: 500 INJECTION, SOLUTION INTRAVENOUS at 14:46

## 2018-02-23 RX ADMIN — MORPHINE SULFATE PRN MG: 10 INJECTION, SOLUTION INTRAMUSCULAR; INTRAVENOUS at 02:17

## 2018-02-23 RX ADMIN — DEXTROSE MONOHYDRATE SCH MLS: 70 INJECTION, SOLUTION INTRAVENOUS at 23:03

## 2018-02-23 RX ADMIN — HYDROCORTISONE SODIUM SUCCINATE SCH MG: 100 INJECTION, POWDER, FOR SOLUTION INTRAMUSCULAR; INTRAVENOUS at 02:16

## 2018-02-23 RX ADMIN — INSULIN LISPRO PRN UNIT: 100 INJECTION, SOLUTION INTRAVENOUS; SUBCUTANEOUS at 04:40

## 2018-02-23 RX ADMIN — HYDROCORTISONE SODIUM SUCCINATE SCH MG: 100 INJECTION, POWDER, FOR SOLUTION INTRAMUSCULAR; INTRAVENOUS at 08:13

## 2018-02-23 RX ADMIN — CEFTRIAXONE SCH MLS: 1 INJECTION, POWDER, FOR SOLUTION INTRAMUSCULAR; INTRAVENOUS at 08:23

## 2018-02-23 RX ADMIN — HYDROCORTISONE SODIUM SUCCINATE SCH MG: 100 INJECTION, POWDER, FOR SOLUTION INTRAMUSCULAR; INTRAVENOUS at 14:49

## 2018-02-23 RX ADMIN — INSULIN LISPRO PRN UNIT: 100 INJECTION, SOLUTION INTRAVENOUS; SUBCUTANEOUS at 10:41

## 2018-02-23 RX ADMIN — METRONIDAZOLE SCH MLS: 500 INJECTION, SOLUTION INTRAVENOUS at 23:03

## 2018-02-23 RX ADMIN — Medication SCH ML: at 20:17

## 2018-02-23 RX ADMIN — HYDROCORTISONE SODIUM SUCCINATE SCH MG: 100 INJECTION, POWDER, FOR SOLUTION INTRAMUSCULAR; INTRAVENOUS at 20:14

## 2018-02-23 NOTE — PRG
DATE OF SERVICE:  02/23/2018

 

SUBJECTIVE:  Esthela Edwards remains afebrile.  He is still receiving TPN.  He still has NG tube suctio
n.

 

PHYSICAL EXAMINATION:

VITAL SIGNS:  Heart rate is in the 70s, blood pressure 157/62, respiratory rate is 18-24, and oximetr
y is 97% on room air.

LUNGS:  Clear.

HEART:  Regular rhythm.

ABDOMEN:  Soft.

 

LABORATORY DATA:  Sodium 151, potassium 3.6, chloride 111, bicarbonate 32, BUN 69, creatinine 0.94, g
lucose is 264.

 

IMPRESSION:

1.  Prolonged ileus.

2.  Extreme deconditioning.

3.  Advanced age.

 

PLAN:  Continue nutritional support and gastric suction per General Surgery.

## 2018-02-23 NOTE — PRG
DATE OF SERVICE:  02/23/2018

 

SUBJECTIVE:  Ms. Edwards is doing well in [facility].  She continues with NG tube.  Nurses are doing an excell
ent job, getting her out of bed 2-4 times a day.  She is almost a total lift.

 

OBJECTIVE:

VITAL SIGNS:  Temperature 97.7 degrees, blood pressure 157/62, 97% saturation, 24 respiratory rate re
cently usually 18.  Nasogastric tube output 24 hours 1250.  Moran output reported 400 for 12 hours, I
 do not have a 24-hour output perhaps it is 900 recorded in different areas, but at least 900 over 24
 hours adequate.

LUNGS:  Clear to auscultation.

CARDIAC:  Regular rate and rhythm, controlled rate.

ABDOMEN:  Soft.  No bowel sounds audible.

EXTREMITIES:  Unremarkable.

 

LABORATORY:  Reveal white count 11.4, hemoglobin of 12, which is stable.  Sodium 151, potassium 3.6, 
carbon dioxide 32, BUN 69, creatinine 0.94.  Accu-Cheks are better control 193-230, calcium 8.9.  Mag
nesium 2.1 this morning.

 

ASSESSMENT AND PLAN:

1.  Severe deconditioning.  Continue mobility efforts, she will need rehabilitation or skilled nursin
g when she recovers.

2.  TPN dependent.  Continue TPN until ileus resolves.

3.  Electrolytes have been corrected.  Accu-Cheks are improved.  We added insulin in her TPN.

4.  Wound, upper wound portion is granulating.  Lower wound has some soupy discharge, it is not odoro
us.  It is slightly light tan colored, certainly does not appear feculent and is of minimal volume, n
ot filling the canister, continued to observe.

## 2018-02-24 LAB
ANION GAP SERPL CALC-SCNC: 11 MMOL/L (ref 10–20)
BASOPHILS # BLD AUTO: 0 THOU/UL (ref 0–0.2)
BASOPHILS NFR BLD AUTO: 0.2 % (ref 0–1)
BUN SERPL-MCNC: 90 MG/DL (ref 9.8–20.1)
CALCIUM SERPL-MCNC: 8.7 MG/DL (ref 7.8–10.44)
CHLORIDE SERPL-SCNC: 106 MMOL/L (ref 98–107)
CO2 SERPL-SCNC: 33 MMOL/L (ref 23–31)
CREAT CL PREDICTED SERPL C-G-VRATE: 46 ML/MIN (ref 70–130)
EOSINOPHIL # BLD AUTO: 0.1 THOU/UL (ref 0–0.7)
EOSINOPHIL NFR BLD AUTO: 0.6 % (ref 0–10)
GLUCOSE SERPL-MCNC: 357 MG/DL (ref 83–110)
HGB BLD-MCNC: 10.6 G/DL (ref 12–16)
LYMPHOCYTES # BLD: 0.8 THOU/UL (ref 1.2–3.4)
LYMPHOCYTES NFR BLD AUTO: 4.8 % (ref 21–51)
MAGNESIUM SERPL-MCNC: 2.1 MG/DL (ref 1.6–2.6)
MCH RBC QN AUTO: 31 PG (ref 27–31)
MCV RBC AUTO: 94.7 FL (ref 81–99)
MONOCYTES # BLD AUTO: 0.6 THOU/UL (ref 0.11–0.59)
MONOCYTES NFR BLD AUTO: 3.4 % (ref 0–10)
NEUTROPHILS # BLD AUTO: 15.8 THOU/UL (ref 1.4–6.5)
NEUTROPHILS NFR BLD AUTO: 91 % (ref 42–75)
PLATELET # BLD AUTO: 205 THOU/UL (ref 130–400)
POTASSIUM SERPL-SCNC: 4 MMOL/L (ref 3.5–5.1)
RBC # BLD AUTO: 3.42 MILL/UL (ref 4.2–5.4)
SODIUM SERPL-SCNC: 146 MMOL/L (ref 136–145)
WBC # BLD AUTO: 17.3 THOU/UL (ref 4.8–10.8)

## 2018-02-24 RX ADMIN — Medication SCH ML: at 20:34

## 2018-02-24 RX ADMIN — HYDROCORTISONE SODIUM SUCCINATE SCH MG: 100 INJECTION, POWDER, FOR SOLUTION INTRAMUSCULAR; INTRAVENOUS at 20:33

## 2018-02-24 RX ADMIN — HYDROCORTISONE SODIUM SUCCINATE SCH MG: 100 INJECTION, POWDER, FOR SOLUTION INTRAMUSCULAR; INTRAVENOUS at 15:36

## 2018-02-24 RX ADMIN — CEFTRIAXONE SCH MLS: 1 INJECTION, POWDER, FOR SOLUTION INTRAMUSCULAR; INTRAVENOUS at 09:02

## 2018-02-24 RX ADMIN — METRONIDAZOLE SCH MLS: 500 INJECTION, SOLUTION INTRAVENOUS at 05:29

## 2018-02-24 RX ADMIN — DEXTROSE MONOHYDRATE SCH MLS: 70 INJECTION, SOLUTION INTRAVENOUS at 22:29

## 2018-02-24 RX ADMIN — INSULIN LISPRO PRN UNIT: 100 INJECTION, SOLUTION INTRAVENOUS; SUBCUTANEOUS at 17:54

## 2018-02-24 RX ADMIN — HYDROCORTISONE SODIUM SUCCINATE SCH MG: 100 INJECTION, POWDER, FOR SOLUTION INTRAMUSCULAR; INTRAVENOUS at 02:17

## 2018-02-24 RX ADMIN — METRONIDAZOLE SCH MLS: 500 INJECTION, SOLUTION INTRAVENOUS at 15:36

## 2018-02-24 RX ADMIN — HYDROCORTISONE SODIUM SUCCINATE SCH MG: 100 INJECTION, POWDER, FOR SOLUTION INTRAMUSCULAR; INTRAVENOUS at 09:01

## 2018-02-24 RX ADMIN — METRONIDAZOLE SCH MLS: 500 INJECTION, SOLUTION INTRAVENOUS at 22:33

## 2018-02-24 RX ADMIN — DILTIAZEM HYDROCHLORIDE SCH MLS: 5 INJECTION INTRAVENOUS at 17:54

## 2018-02-24 RX ADMIN — Medication SCH ML: at 09:02

## 2018-02-24 RX ADMIN — INSULIN LISPRO PRN UNIT: 100 INJECTION, SOLUTION INTRAVENOUS; SUBCUTANEOUS at 05:32

## 2018-02-24 NOTE — PRG
DATE OF SERVICE:  02/24/2018

 

SUBJECTIVE:  Ms. Edwards is resting comfortably this morning.  No complaints.

 

The patient still has the NG tube in place.

 

No chest pain or pressure reported.

 

PHYSICAL EXAMINATION:

VITAL SIGNS:  Blood pressure 115/44 and pulse 69, it is sinus with PACs.

LUNGS:  Clear.

CARDIAC:  Normal S1, normal S2.

EXTREMITIES:  No edema.

SKIN:  Warm and dry.

 

ASSESSMENT:

1.  Paroxysmal atrial fibrillation, maintaining sinus rhythm.

2.  Small-bowel obstruction.

 

PLAN:  The patient is on intravenous diltiazem and intravenous metoprolol.  We will change to oral wh
en she is able to take oral medicines.

## 2018-02-24 NOTE — PRG
DATE OF SERVICE:  02/24/2018

 

SUBJECTIVE:  The patient is sitting up in a chair.  She says she wants to have one sip of Pepsi.  Krissy
arently she is being kept n.p.o. except for ice chips because of mechanical bowel obstruction.

 

PHYSICAL EXAMINATION:

VITAL SIGNS:  Her temperature is 97.8, pulse 69, respirations 18, O2 saturation 94% on room air, bloo
d pressure 118/44.

HEENT:  Unremarkable.

NECK:  No JVD.

CHEST:  Clear.

CARDIAC:  S1 and S2 regular.

ABDOMEN:  Slightly distended.  Bowel sounds hypoactive.

EXTREMITIES:  No edema.

 

ASSESSMENT:

1.  Ileus.

2.  Deconditioning.

 

PLAN:  She is continuing antibiotic therapy and conservative care with NG tube per General Surgery.  
She is on TPN.  Her status appears to be stable and advancement of diet will be predicated on the opi
nion of the general surgeons involved in this case.

## 2018-02-25 LAB
ANION GAP SERPL CALC-SCNC: 13 MMOL/L (ref 10–20)
BASOPHILS # BLD AUTO: 0 THOU/UL (ref 0–0.2)
BASOPHILS NFR BLD AUTO: 0.2 % (ref 0–1)
BUN SERPL-MCNC: 106 MG/DL (ref 9.8–20.1)
CALCIUM SERPL-MCNC: 8.6 MG/DL (ref 7.8–10.44)
CHLORIDE SERPL-SCNC: 103 MMOL/L (ref 98–107)
CO2 SERPL-SCNC: 29 MMOL/L (ref 23–31)
CREAT CL PREDICTED SERPL C-G-VRATE: 43 ML/MIN (ref 70–130)
EOSINOPHIL # BLD AUTO: 0.1 THOU/UL (ref 0–0.7)
EOSINOPHIL NFR BLD AUTO: 0.8 % (ref 0–10)
GLUCOSE SERPL-MCNC: 283 MG/DL (ref 83–110)
HGB BLD-MCNC: 9.8 G/DL (ref 12–16)
LYMPHOCYTES # BLD: 0.8 THOU/UL (ref 1.2–3.4)
LYMPHOCYTES NFR BLD AUTO: 6.2 % (ref 21–51)
MAGNESIUM SERPL-MCNC: 2.3 MG/DL (ref 1.6–2.6)
MCH RBC QN AUTO: 30.6 PG (ref 27–31)
MCV RBC AUTO: 94.7 FL (ref 81–99)
MONOCYTES # BLD AUTO: 0.6 THOU/UL (ref 0.11–0.59)
MONOCYTES NFR BLD AUTO: 4 % (ref 0–10)
NEUTROPHILS # BLD AUTO: 12 THOU/UL (ref 1.4–6.5)
NEUTROPHILS NFR BLD AUTO: 88.7 % (ref 42–75)
PLATELET # BLD AUTO: 226 THOU/UL (ref 130–400)
POTASSIUM SERPL-SCNC: 4.3 MMOL/L (ref 3.5–5.1)
RBC # BLD AUTO: 3.2 MILL/UL (ref 4.2–5.4)
SODIUM SERPL-SCNC: 141 MMOL/L (ref 136–145)
WBC # BLD AUTO: 13.5 THOU/UL (ref 4.8–10.8)

## 2018-02-25 RX ADMIN — METRONIDAZOLE SCH MLS: 500 INJECTION, SOLUTION INTRAVENOUS at 05:07

## 2018-02-25 RX ADMIN — METRONIDAZOLE SCH MLS: 500 INJECTION, SOLUTION INTRAVENOUS at 14:26

## 2018-02-25 RX ADMIN — DEXTROSE MONOHYDRATE SCH MLS: 70 INJECTION, SOLUTION INTRAVENOUS at 21:46

## 2018-02-25 RX ADMIN — HYDROCORTISONE SODIUM SUCCINATE SCH MG: 100 INJECTION, POWDER, FOR SOLUTION INTRAMUSCULAR; INTRAVENOUS at 14:27

## 2018-02-25 RX ADMIN — INSULIN LISPRO PRN UNIT: 100 INJECTION, SOLUTION INTRAVENOUS; SUBCUTANEOUS at 04:38

## 2018-02-25 RX ADMIN — Medication SCH ML: at 21:05

## 2018-02-25 RX ADMIN — HYDROCORTISONE SODIUM SUCCINATE SCH MG: 100 INJECTION, POWDER, FOR SOLUTION INTRAMUSCULAR; INTRAVENOUS at 02:08

## 2018-02-25 RX ADMIN — CEFTRIAXONE SCH MLS: 1 INJECTION, POWDER, FOR SOLUTION INTRAMUSCULAR; INTRAVENOUS at 09:14

## 2018-02-25 RX ADMIN — Medication SCH ML: at 09:15

## 2018-02-25 RX ADMIN — HYDROCORTISONE SODIUM SUCCINATE SCH MG: 100 INJECTION, POWDER, FOR SOLUTION INTRAMUSCULAR; INTRAVENOUS at 09:15

## 2018-02-25 RX ADMIN — INSULIN LISPRO PRN UNIT: 100 INJECTION, SOLUTION INTRAVENOUS; SUBCUTANEOUS at 21:47

## 2018-02-25 RX ADMIN — HYDROCORTISONE SODIUM SUCCINATE SCH MG: 100 INJECTION, POWDER, FOR SOLUTION INTRAMUSCULAR; INTRAVENOUS at 21:04

## 2018-02-25 NOTE — PRG
DATE OF SERVICE:  02/25/2018

 

SUBJECTIVE:  Ms. Edwards is awake and alert today, no complaints.

 

NG tube is still in place.

 

PHYSICAL EXAMINATION:

VITAL SIGNS:  Blood pressure 119/41, pulse 64 and regular.

LUNGS:  Clear.

CARDIAC:  Normal S1 and S2.

 

ASSESSMENT:

1.  Nasogastric tube remains in place.

2.  Paroxysmal atrial fibrillation, maintaining sinus rhythm.

 

PLAN:  Continue current regimen.  The nurse indicated that the patient has some bowel sounds earlier.

## 2018-02-25 NOTE — PRG
DATE OF SERVICE:  02/25/2018

 

SUBJECTIVE:  Ms. Edwards is doing well today.  She is in her bed.  She states she has been up in the ch
air this morning.  She asked me when she can have her NG tube out.  She has not had a bowel movement 
or passed flatus.

 

OBJECTIVE:

VITAL SIGNS:  Temperature 97.8 degrees, 75, 120/43.  NG tube output in the last 24 hours, 1750, urine
 output 1050.

LUNGS:  Clear to auscultation.

CARDIAC:  Regular rate and rhythm without murmur or gallop.

ABDOMEN:  Soft, occasional bowel sounds.

EXTREMITIES:  Unremarkable.

 

LABORATORY DATA:  White count 13, hemoglobin 9.8.  Basic metabolic profile normal.  BUN 6 and creatin
ine 1.21.

 

ASSESSMENT AND PLAN:

1.  Prolonged ileus.  Continue TPN, NG tube and n.p.o.

2.  Slight increased BUN and creatinine.  We will get increase her IV fluid rate and give her a fluid
 bolus.

3.  Deconditioning and weakness.  She will need skilled nursing rehabilitation eventually when GI fun
ction returns.

4.  Open wound of abdomen.  Continue wound VAC, view her wound tomorrow.

## 2018-02-25 NOTE — PRG
DATE OF SERVICE:  02/25/2018

 

SUBJECTIVE:  The patient is doing somewhat better, had no acute complaints this morning.

 

PHYSICAL EXAMINATION:

VITAL SIGNS:  Temperature is 97.2, pulse 64, respiratory rate 16, O2 sat 97% on room air.

HEENT:  Unremarkable.

NECK:  Supple.  No JVD.

CHEST:  Clear.

CARDIAC:  S1 and S2 regular.

ABDOMEN:  Soft.  Bowel sounds hypoactive.

EXTREMITIES:  No edema.

 

LABORATORY DATA:  White blood cell count 13, hematocrit 30, platelet count 226.  Sodium 141, potassiu
m 4.3, chloride 103, CO2 of 29, , creatinine 1.2, glucose 283.

 

ASSESSMENT:

1.  Ileus.

2.  Renal insufficiency.

3.  Deconditioning.

 

PLAN:  The patient can probably be transferred out to the surgical floor.  There are no acute pulmona
ry issues at this time, may need Nephrology input concerning her renal status.

## 2018-02-26 LAB
ANION GAP SERPL CALC-SCNC: 11 MMOL/L (ref 10–20)
BASOPHILS # BLD AUTO: 0.1 THOU/UL (ref 0–0.2)
BASOPHILS NFR BLD AUTO: 0.6 % (ref 0–1)
BUN SERPL-MCNC: 106 MG/DL (ref 9.8–20.1)
CALCIUM SERPL-MCNC: 8.2 MG/DL (ref 7.8–10.44)
CHLORIDE SERPL-SCNC: 105 MMOL/L (ref 98–107)
CO2 SERPL-SCNC: 30 MMOL/L (ref 23–31)
CREAT CL PREDICTED SERPL C-G-VRATE: 46 ML/MIN (ref 70–130)
EOSINOPHIL # BLD AUTO: 0.1 THOU/UL (ref 0–0.7)
EOSINOPHIL NFR BLD AUTO: 0.9 % (ref 0–10)
GLUCOSE SERPL-MCNC: 176 MG/DL (ref 83–110)
HGB BLD-MCNC: 9.2 G/DL (ref 12–16)
LYMPHOCYTES # BLD: 0.9 THOU/UL (ref 1.2–3.4)
LYMPHOCYTES NFR BLD AUTO: 9.3 % (ref 21–51)
MAGNESIUM SERPL-MCNC: 2.3 MG/DL (ref 1.6–2.6)
MCH RBC QN AUTO: 31.2 PG (ref 27–31)
MCV RBC AUTO: 95 FL (ref 81–99)
MONOCYTES # BLD AUTO: 0.7 THOU/UL (ref 0.11–0.59)
MONOCYTES NFR BLD AUTO: 6.8 % (ref 0–10)
NEUTROPHILS # BLD AUTO: 8.2 THOU/UL (ref 1.4–6.5)
NEUTROPHILS NFR BLD AUTO: 82.4 % (ref 42–75)
PLATELET # BLD AUTO: 227 THOU/UL (ref 130–400)
POTASSIUM SERPL-SCNC: 4.2 MMOL/L (ref 3.5–5.1)
RBC # BLD AUTO: 2.95 MILL/UL (ref 4.2–5.4)
SODIUM SERPL-SCNC: 142 MMOL/L (ref 136–145)
WBC # BLD AUTO: 9.9 THOU/UL (ref 4.8–10.8)

## 2018-02-26 RX ADMIN — HYDROCORTISONE SODIUM SUCCINATE SCH MG: 100 INJECTION, POWDER, FOR SOLUTION INTRAMUSCULAR; INTRAVENOUS at 03:47

## 2018-02-26 RX ADMIN — MORPHINE SULFATE PRN MG: 10 INJECTION, SOLUTION INTRAMUSCULAR; INTRAVENOUS at 15:29

## 2018-02-26 RX ADMIN — Medication SCH ML: at 22:34

## 2018-02-26 RX ADMIN — Medication SCH: at 08:52

## 2018-02-26 RX ADMIN — DEXTROSE MONOHYDRATE SCH MLS: 70 INJECTION, SOLUTION INTRAVENOUS at 22:16

## 2018-02-26 RX ADMIN — INSULIN LISPRO PRN UNIT: 100 INJECTION, SOLUTION INTRAVENOUS; SUBCUTANEOUS at 04:05

## 2018-02-26 RX ADMIN — DILTIAZEM HYDROCHLORIDE SCH MLS: 5 INJECTION INTRAVENOUS at 23:51

## 2018-02-26 NOTE — PRG
DATE OF SERVICE:  02/26/2018

 

HISTORY OF PRESENT ILLNESS:  Esthela Edwards is an 89-year-old female in [facility].  The patient is statu
s post 02/14/2018 (12 days) 4-hour laparotomy with 2 small bowel resections.  She is hemodynamically 
stable.  She has an NG tube down, output has been 1075 in the last 24 hours, which is decreased from 
1750 the day prior and 2430 the day prior to that.  Urine output is good.  The patient's wound VAC wa
s changed today and the upper half of the wound is granulating and healing well, the lower half has f
eculent drainage.  This was not evident with the VAC, but is evident on the VAC change.  There was a 
small fascial defect present in the lower part of the wound, probably fingertip size.  

 

LABORATORY:  White count 9, hemoglobin 9.2, sodium 192, , creatinine 1.17, which is stable fro
m yesterday.  Accu-Cheks 140 to 180, phosphorus 5.3, magnesium 2.3.  

 

PHYSICAL EXAMINATION: 

LUNGS:  Clear to auscultation.

CARDIAC:  Regular rate and rhythm without murmur or gallop.

ABDOMEN:  Soft, occasional bowel sounds.

EXTREMITIES:  Unremarkable.

 

ASSESSMENT AND PLAN:

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
s not decompressed by the VAC dressing.  We will discuss with Wound Care and since she is hemodynamic
ally stable we will discuss management of either non-VAC care versus VAC care.  This fistula may clos
e spontaneously.  There is no evidence of mucosa protruding through the small fascial defect.  Would 
treat her nonoperatively at this point and continue TPN and NG tube suction and monitor output.

2.  Deconditioning.  Continue mobility efforts.

3.  Paroxysmal atrial fibrillation under good control.

4.  Accu-Cheks under better control with subcu insulin.

## 2018-02-26 NOTE — PRG
DATE OF SERVICE:  02/26/2018

 

SUBJECTIVE:  Ms. Edwards remains stable.

 

PHYSICAL EXAMINATION:

VITAL SIGNS:  She is afebrile, heart rate is in the 70s, respiratory rate 20, oximetry is 99% on room
 air, blood pressure 154/54.

LUNGS:  Clear anteriorly.

HEART:  Regular rhythm.  S1 and S2 are normal.

ABDOMEN:  Soft.  She is not complaining of abdominal pain.

EXTREMITIES:  Without asymmetry or edema.

 

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  She has an NG tube still i
n with significant amount of bile in the canister next of the bed.

 

Other issues include deconditioning, atrial fibrillation, and diabetes.

 

PLAN:  Continue with supportive care.

## 2018-02-27 LAB
ANION GAP SERPL CALC-SCNC: 13 MMOL/L (ref 10–20)
BUN SERPL-MCNC: 117 MG/DL (ref 9.8–20.1)
CALCIUM SERPL-MCNC: 8.2 MG/DL (ref 7.8–10.44)
CHLORIDE SERPL-SCNC: 103 MMOL/L (ref 98–107)
CO2 SERPL-SCNC: 30 MMOL/L (ref 23–31)
CREAT CL PREDICTED SERPL C-G-VRATE: 48 ML/MIN (ref 70–130)
GLUCOSE SERPL-MCNC: 31 MG/DL (ref 83–110)
HGB BLD-MCNC: 9.4 G/DL (ref 12–16)
MCH RBC QN AUTO: 30.3 PG (ref 27–31)
MCV RBC AUTO: 96.1 FL (ref 81–99)
MDIFF COMPLETE?: YES
PLATELET # BLD AUTO: 271 THOU/UL (ref 130–400)
PLATELET BLD QL SMEAR: (no result)
POTASSIUM SERPL-SCNC: 4.5 MMOL/L (ref 3.5–5.1)
RBC # BLD AUTO: 3.12 MILL/UL (ref 4.2–5.4)
SODIUM SERPL-SCNC: 141 MMOL/L (ref 136–145)
WBC # BLD AUTO: 10.6 THOU/UL (ref 4.8–10.8)

## 2018-02-27 RX ADMIN — DEXTROSE MONOHYDRATE PRN GM: 25 INJECTION, SOLUTION INTRAVENOUS at 11:44

## 2018-02-27 RX ADMIN — DEXTROSE MONOHYDRATE PRN GM: 25 INJECTION, SOLUTION INTRAVENOUS at 05:07

## 2018-02-27 RX ADMIN — DEXTROSE MONOHYDRATE SCH MLS: 70 INJECTION, SOLUTION INTRAVENOUS at 15:23

## 2018-02-27 RX ADMIN — Medication SCH ML: at 21:09

## 2018-02-27 RX ADMIN — Medication SCH ML: at 11:58

## 2018-02-27 NOTE — PRG
DATE OF SERVICE:  02/27/2018

 

SUBJECTIVE:  Ms. Proctor is sleeping more today.  She would awaken.  She had no new complaints.

 

OBJECTIVE:

VITAL SIGNS:  She is afebrile, heart rate is 90, blood pressure is 132/43 this evening.  Her oximetry
 is  on room air.

LUNGS:  Clear.

HEART:  Regular rhythm.

ABDOMEN:  Soft.

 

She did have hypoglycemia.  All of the insulin has been removed from her next bag of TPN.

 

LABORATORY DATA:  White count is 10.6, hemoglobin 9.4, platelets 271,000.  Sodium 141, potassium 4.5,
 chloride 103, bicarbonate 30, , creatinine 1.13.

 

IMPRESSION:

1.  Hypoglycemia.  Hopefully this is indicative of early sepsis.

2.  Enterocutaneous fistula.

3.  Status post laparotomy with a prolonged ileus.

4.  Prerenal azotemia, in spite of several days of positive fluid balance.

 

Her prognosis for survival I think is quite poor.  We have changed her TPN today to remove the insuli
n from her TPN.

 

We will continue supportive care.

 

I met with family in the room both yesterday and today and answered all their questions.

## 2018-02-27 NOTE — PRG
DATE OF SERVICE:  02/27/2018

 

SUBJECTIVE:  Esthela Edwards is awake and communicative.

 

PHYSICAL EXAMINATION:

VITAL SIGNS:  Heart rate 67, 91/53, respiratory rate 14, NG tube in place, 800 mL 24 hour output whic
h is markedly improved from 1075 the day before and 1750 day prior.  Moran output 1400 per 24 hours. 
 Patient has enterocutaneous fistula draining to her lower midline incision.  Wound VAC drainage is m
inimal and still wound VAC is being used.

LUNGS:  Clear to auscultation.

CARDIAC:  Regular rate and rhythm, heart rate 80.

ABDOMEN:  Soft.  Diminished bowel sounds, no bowel movements or flatus today.

EXTREMITIES:  Unremarkable.

 

ASSESSMENT AND PLAN:

1.  Enterocutaneous fistula.  We will continue TPN, NG tube decompression, Sandostatin subcu q.8 hour
s to decrease enteric drainage.  So far the wound VAC is putting out very little and we will view her
 wound tomorrow.  We will obtain a CAT scan of the abdomen and pelvis tomorrow morning.

2.  Chronic anemia, stable hemoglobin 9.4, platelet count 271,000.

3.  Hypoglycemia.  We will adjust her nighttime Levemir.  Her Accu-Cheks 140 and 111 with insulin in 
her TPN and Levemir subcu.  We will decrease her phosphorus and her TPN and consider phosphorus level
.

4.  Deconditioning.  Continue physical therapy.  Continue nursing up out of bed 2-3 times a day.

5.  Expect enterocutaneous fistula, hopefully resolve nonoperatively.

## 2018-02-28 LAB
ALBUMIN SERPL BCG-MCNC: 2 G/DL (ref 3.4–4.8)
ALP SERPL-CCNC: 86 U/L (ref 40–150)
ALT SERPL W P-5'-P-CCNC: (no result) U/L (ref 8–55)
ANION GAP SERPL CALC-SCNC: 10 MMOL/L (ref 10–20)
AST SERPL-CCNC: 14 U/L (ref 5–34)
BASOPHILS # BLD AUTO: 0.1 THOU/UL (ref 0–0.2)
BASOPHILS NFR BLD AUTO: 1.6 % (ref 0–1)
BILIRUB DIRECT SERPL-MCNC: 0.2 MG/DL (ref 0.1–0.3)
BILIRUB SERPL-MCNC: 0.3 MG/DL (ref 0.2–1.2)
BUN SERPL-MCNC: 88 MG/DL (ref 9.8–20.1)
CALCIUM SERPL-MCNC: 8 MG/DL (ref 7.8–10.44)
CHLORIDE SERPL-SCNC: 101 MMOL/L (ref 98–107)
CO2 SERPL-SCNC: 29 MMOL/L (ref 23–31)
CREAT CL PREDICTED SERPL C-G-VRATE: 47 ML/MIN (ref 70–130)
EOSINOPHIL # BLD AUTO: 0.4 THOU/UL (ref 0–0.7)
EOSINOPHIL NFR BLD AUTO: 4.7 % (ref 0–10)
GLUCOSE SERPL-MCNC: 539 MG/DL (ref 83–110)
HGB BLD-MCNC: 8.8 G/DL (ref 12–16)
LYMPHOCYTES # BLD: 1 THOU/UL (ref 1.2–3.4)
LYMPHOCYTES NFR BLD AUTO: 10.8 % (ref 21–51)
MAGNESIUM SERPL-MCNC: 2.4 MG/DL (ref 1.6–2.6)
MCH RBC QN AUTO: 30.1 PG (ref 27–31)
MCV RBC AUTO: 96.9 FL (ref 81–99)
MONOCYTES # BLD AUTO: 0.5 THOU/UL (ref 0.11–0.59)
MONOCYTES NFR BLD AUTO: 5.4 % (ref 0–10)
NEUTROPHILS # BLD AUTO: 7.2 THOU/UL (ref 1.4–6.5)
NEUTROPHILS NFR BLD AUTO: 77.6 % (ref 42–75)
PLATELET # BLD AUTO: 260 THOU/UL (ref 130–400)
POTASSIUM SERPL-SCNC: 5.3 MMOL/L (ref 3.5–5.1)
RBC # BLD AUTO: 2.93 MILL/UL (ref 4.2–5.4)
SODIUM SERPL-SCNC: 135 MMOL/L (ref 136–145)
WBC # BLD AUTO: 9.3 THOU/UL (ref 4.8–10.8)

## 2018-02-28 RX ADMIN — Medication SCH ML: at 20:45

## 2018-02-28 RX ADMIN — INSULIN LISPRO PRN UNIT: 100 INJECTION, SOLUTION INTRAVENOUS; SUBCUTANEOUS at 05:10

## 2018-02-28 RX ADMIN — INSULIN LISPRO PRN UNIT: 100 INJECTION, SOLUTION INTRAVENOUS; SUBCUTANEOUS at 12:48

## 2018-02-28 RX ADMIN — DEXTROSE MONOHYDRATE SCH MLS: 70 INJECTION, SOLUTION INTRAVENOUS at 14:15

## 2018-02-28 RX ADMIN — Medication SCH ML: at 10:36

## 2018-02-28 RX ADMIN — INSULIN LISPRO PRN UNIT: 100 INJECTION, SOLUTION INTRAVENOUS; SUBCUTANEOUS at 17:45

## 2018-02-28 RX ADMIN — INSULIN LISPRO PRN UNIT: 100 INJECTION, SOLUTION INTRAVENOUS; SUBCUTANEOUS at 20:45

## 2018-02-28 NOTE — PRG
DATE OF SERVICE:  02/28/2018

 

SUBJECTIVE:  Ms. Esthela Edwards is essentially the same.

 

LABORATORY DATA:  Blood glucose got up to 539, it is down to 376 now.

 

PH 7.42, CO2 of 31, pO2 149.

 

OBJECTIVE:

LUNGS:  Clear.

HEART:  Regular rhythm.

ABDOMEN:  Soft.

 

LABORATORY DATA:  White count is 9.3, hemoglobin 8.8, platelets 260.  Sodium 135, potassium 5.3, chlo
ride 101, bicarbonate 29, BUN 88, creatinine is 1.16.

 

Intake and output was negative 965.

 

IMPRESSION:

1.  Respiratory insufficiency.  She is clinically stable, but her muscle weakness is the biggest issu
e.

2.  Enterocutaneous fistula.

3.  Hyperglycemia.  Yesterday, insulin has been removed from her TPN.  We might be able to move forwa
rd with 20-30 units of slow long-acting insulin like Lantus a day with an additional sliding scale to
 continue with NG suction and wound care.

## 2018-02-28 NOTE — CT
CT ABDOMEN AND PELVIS WITHOUT CONTRAST:

 

HISTORY: 

Bowel resection.  Wound vac.  Evaluate for a leak.

 

COMPARISON: 

CT abdomen and pelvis 2/13/18.

 

FINDINGS: 

There is consolidation in the right lung base.  Atelectasis in the left lung base.  No significant pe
ricardial effusion.  Numerous calcified mediastinal lymph nodes.

 

There is an enterocutaneous fistula with contrast and gas extending to the midline surgical wound wit
h a wound vac in place.  There is also a collection of fluid in the deep pelvis extending along the l
eft paracolic gutter which does not contain ingested contrast, although it is likely the sequelae of 
either a 2nd leak or a leak for which contrast has not yet reached.  Numerous suture lines.

 

The aortic contour is nonaneurysmal.  There is some gas within the urinary bladder.

 

Splenic granulomas are present.  Liver is unremarkable.  Prior cholecystectomy.

 

There is a hypodensity superior pole left kidney suggestive of a cyst.

 

IMPRESSION: 

1.  Enterpcutaneous fistula with a small focus of ingested oral contrast and air extending to the mid
line wound. 

 

2.  Collection of fluid within the pelvic cul-de-sac and along the left paracolic gutter.  This does 
not contain ingested oral contrast, although contrast only seen to the level of the proximal small.  
This may represent a contained leak.  

 

3.  Extensive facet arthropathy lower lumbar spine.

 

POS: [facility]

## 2018-02-28 NOTE — PRG
DATE OF SERVICE:  02/28/2018

 

SUBJECTIVE:  Ms. Esthela Edwards is doing well today.

 

OBJECTIVE:

VITAL SIGNS:  Temperature 98.8 degrees, pulse 75, respirations 18 and blood pressure 139/46.  Drainag
e from her NG tube 24 hours is 350.  Output is 3500, urine output.

LUNGS:  Clear to auscultation.

CARDIAC:  Regular rate and rhythm without murmur or gallop.

ABDOMEN:  Soft.  Rare bowel sounds.

EXTREMITIES:  Unremarkable.

 

LABORATORY AND IMAGING DATA:  This morning, her white count is 9 and hemoglobin 8.8.  Sodium 135, pot
assium 5.3, BUN 88 and creatinine 1.16.  Accu-Cheks 300-400.  Liver function tests normal.  Magnesium
 and phosphorus are normal.  She underwent a CAT scan this morning, abdomen and pelvis, oral contrast
, no IV contrast.  There is an enterocutaneous fistula with contrast and gas seen to the midline surg
ical wound with a wound VAC in place.  There is a collection of fluid in the deep pelvis seen in the 
left pericolic gutter, which does not contain a contrast.

 

ASSESSMENT AND PLAN:

1.  Enterocutaneous fistula.  Continue wound VAC care, nonoperative therapy.  She is on Sandostatin, 
NG tube decompression and TPN nutrition supplements.  I will need to adjust her TPN for high potassiu
m.

2.  Deconditioning.  Continue mobility efforts.

3.  It is encouraging that her NG tube output is diminished and the wound VAC output is not significa
nt and when I inspected her wound this morning, the wound was not overwhelmed with enteric fluid.  We
 will continue current regimen and mobility efforts.

## 2018-03-01 LAB
ANION GAP SERPL CALC-SCNC: 7 MMOL/L (ref 10–20)
BUN SERPL-MCNC: 61 MG/DL (ref 9.8–20.1)
CALCIUM SERPL-MCNC: 8.5 MG/DL (ref 7.8–10.44)
CHLORIDE SERPL-SCNC: 104 MMOL/L (ref 98–107)
CO2 SERPL-SCNC: 29 MMOL/L (ref 23–31)
CREAT CL PREDICTED SERPL C-G-VRATE: 67 ML/MIN (ref 70–130)
GLUCOSE SERPL-MCNC: 284 MG/DL (ref 83–110)
POTASSIUM SERPL-SCNC: 4.9 MMOL/L (ref 3.5–5.1)
SODIUM SERPL-SCNC: 135 MMOL/L (ref 136–145)

## 2018-03-01 RX ADMIN — INSULIN LISPRO PRN UNIT: 100 INJECTION, SOLUTION INTRAVENOUS; SUBCUTANEOUS at 06:14

## 2018-03-01 RX ADMIN — INSULIN LISPRO PRN UNIT: 100 INJECTION, SOLUTION INTRAVENOUS; SUBCUTANEOUS at 11:43

## 2018-03-01 RX ADMIN — Medication SCH ML: at 21:46

## 2018-03-01 RX ADMIN — MORPHINE SULFATE PRN MG: 10 INJECTION, SOLUTION INTRAMUSCULAR; INTRAVENOUS at 16:50

## 2018-03-01 RX ADMIN — Medication SCH ML: at 09:20

## 2018-03-01 NOTE — PRG
DATE OF.DATE OF SERVICE:  03/01/2018

 

SUBJECTIVE:  Esthela Edwards is doing well.  Nurses have done a good job, getting her out of bed into a
 chair.  It is reported that she is bearing all weight and become a little stronger.

 

OBJECTIVE:

VITAL SIGNS:  98.2 degrees, 77, 135/50.  Her gastric drainage is 900 mL over the last 24 hours, urine
 output 3125.

LUNGS:  Clear to auscultation.

CARDIAC:  Regular rate and rhythm without murmur or gallop.

ABDOMEN:  Soft, rare bowel sounds.  No bowel movement.  No flatus.  VAC canister reveals some drainag
e in the canister, but this is not overwhelmed.

 

LABORATORY DATA:  White count 9, hemoglobin 8.8, sodium 135, potassium 4.9, BUN 61, creatinine 0.81 w
hich is markedly improved.  Accu-Cheks are low 200s.

 

ASSESSMENT AND PLAN:

1.  Enterocutaneous fistula postoperative.  Continue NG tube, bowel rest, TPN, Sandostatin.  Hopefull
y, this will resolve nonoperatively.

2.  Ileus.  Await GI function.

3.  Deconditioning.

4.  Continue TPN and mobility.

## 2018-03-01 NOTE — PRG
DATE OF SERVICE:  03/01/2018

 

SUBJECTIVE:  Ms. Edwards is afebrile.  She says she is not hurting today.

 

OBJECTIVE:

VITAL SIGNS:  Heart rate is 80, blood pressure 150/50 and oximetry is 97% on room air.  

LUNGS:  Remarkable clear.

HEART:  Regular rhythm.

ABDOMEN:  Soft.

 

LABORATORY DATA:  White count 9.3, hemoglobin 8.8 and platelets 260.  Sodium 135, potassium 4.9, chlo
ride 104, bicarb 29, BUN 61 and creatinine 0.81.

 

IMPRESSION:

1.  Enterocutaneous fistula.

2.  Respiratory insufficiency, doing well.

3.  Diabetes.

4.  Hyperglycemia.

5.  Her blood glucoses are trending downward.  They are now below 300 with low of 226 today.

6.  Anemia of chronic disease combined with some blood loss anemia associated with blood draws.

 

PLAN:  Continue supportive care under the guidance of General Surgery.  She has no respiratory issues
 at this point in time.

## 2018-03-02 LAB
ANION GAP SERPL CALC-SCNC: 10 MMOL/L (ref 10–20)
BUN SERPL-MCNC: 56 MG/DL (ref 9.8–20.1)
CALCIUM SERPL-MCNC: 8.6 MG/DL (ref 7.8–10.44)
CHLORIDE SERPL-SCNC: 104 MMOL/L (ref 98–107)
CO2 SERPL-SCNC: 28 MMOL/L (ref 23–31)
CREAT CL PREDICTED SERPL C-G-VRATE: 75 ML/MIN (ref 70–130)
GLUCOSE SERPL-MCNC: 62 MG/DL (ref 83–110)
POTASSIUM SERPL-SCNC: 4.2 MMOL/L (ref 3.5–5.1)
SODIUM SERPL-SCNC: 138 MMOL/L (ref 136–145)

## 2018-03-02 RX ADMIN — DEXTROSE MONOHYDRATE PRN GM: 25 INJECTION, SOLUTION INTRAVENOUS at 04:49

## 2018-03-02 RX ADMIN — Medication SCH ML: at 09:08

## 2018-03-02 RX ADMIN — DEXTROSE MONOHYDRATE SCH MLS: 70 INJECTION, SOLUTION INTRAVENOUS at 13:44

## 2018-03-02 RX ADMIN — MORPHINE SULFATE PRN MG: 10 INJECTION, SOLUTION INTRAMUSCULAR; INTRAVENOUS at 09:06

## 2018-03-02 RX ADMIN — Medication SCH ML: at 20:13

## 2018-03-02 NOTE — PRG
DATE OF SERVICE:  03/02/2018

 

She is doing well.  She is at bedside chair.

 

Her daughter is in the room.  Her daughter is happy that she is here, but wishes progress would be qu
icker as do we all. 

 

PHYSICAL EXAMINATION: 

VITAL SIGNS:  She is afebrile, heart rate 100, respiratory rate 16, oximetry is 99 on room air, blood
 pressure 137/49.  

LUNGS:  Lungs are clear anteriorly.

HEART:  Regular rhythm.

ABDOMEN:  Abdomen is soft.

 

She has had 550 mL out of her gastric tube.  There is no recording of wound VAC amounts with the outp
ut section in the computer.

 

IMPRESSION:

1.  Status post enterocutaneous fistula after a laparotomy after a bowel obstruction.

2.  Deconditioning with advanced age.

 

PLAN:  

1.  Continue wound care.  NG suction.

2.  Wound VAC suction.

 

We need to try to keep her out of bed a little bit longer each day.

## 2018-03-02 NOTE — PRG
DATE OF SERVICE:  03/02/2018

 

SUBJECTIVE:  Ms. Esthela Edwards is doing well today in [facility].  Her spirits are good.  She has been out 
of bed into a chair for several hours, 97 degrees, heart rate 80, 142/48.

 

LABORATORY DATA:  Her 24-hour gastric drainage is 550 mL.  Urine output 725 mL.  This morning, her [facility] metabolic profile:  Sodium 138, potassium 4.2, GFR greater than 90, BUN 56, creatinine 0.73.  Acc
u-Cheks are .  Her insulin has been decreasing her TPN.  Her Levemir held and will be discontin
ued.

 

OBJECTIVE:

LUNGS:  Clear to auscultation.

CARDIAC:  Regular rate and rhythm without murmur or gallop.

ABDOMEN:  Soft.  Minimal bowel sounds.  No stool or flatus.  Wound VAC to be changed today.

 

ASSESSMENT AND PLAN:

1.  Enterocutaneous fistula.  Continue TPN and Sandostatin.  NG tube decompression.

2.  Adjust insulin and TPN and Levemir for Accu-Cheks.

3.  Deconditioning.  Continue physical therapy and mobility up in a chair several times a day.

 

Dr. Treadwell will be covering the weekend.

## 2018-03-03 LAB
ANION GAP SERPL CALC-SCNC: 9 MMOL/L (ref 10–20)
BUN SERPL-MCNC: 48 MG/DL (ref 9.8–20.1)
CALCIUM SERPL-MCNC: 8.4 MG/DL (ref 7.8–10.44)
CHLORIDE SERPL-SCNC: 102 MMOL/L (ref 98–107)
CO2 SERPL-SCNC: 30 MMOL/L (ref 23–31)
CREAT CL PREDICTED SERPL C-G-VRATE: 76 ML/MIN (ref 70–130)
GLUCOSE SERPL-MCNC: 215 MG/DL (ref 83–110)
HGB BLD-MCNC: 8.5 G/DL (ref 12–16)
MCH RBC QN AUTO: 30.8 PG (ref 27–31)
MCV RBC AUTO: 93.5 FL (ref 81–99)
MDIFF COMPLETE?: YES
PLATELET # BLD AUTO: 302 THOU/UL (ref 130–400)
POTASSIUM SERPL-SCNC: 3.9 MMOL/L (ref 3.5–5.1)
RBC # BLD AUTO: 2.77 MILL/UL (ref 4.2–5.4)
SODIUM SERPL-SCNC: 137 MMOL/L (ref 136–145)
WBC # BLD AUTO: 10.1 THOU/UL (ref 4.8–10.8)

## 2018-03-03 RX ADMIN — DEXTROSE MONOHYDRATE SCH MLS: 70 INJECTION, SOLUTION INTRAVENOUS at 14:17

## 2018-03-03 RX ADMIN — Medication SCH ML: at 09:10

## 2018-03-03 RX ADMIN — Medication SCH ML: at 20:30

## 2018-03-03 RX ADMIN — INSULIN LISPRO PRN UNIT: 100 INJECTION, SOLUTION INTRAVENOUS; SUBCUTANEOUS at 10:58

## 2018-03-03 RX ADMIN — INSULIN LISPRO PRN UNIT: 100 INJECTION, SOLUTION INTRAVENOUS; SUBCUTANEOUS at 22:44

## 2018-03-03 NOTE — PRG
DATE OF SERVICE:  03/03/2018

 

SERVICE:  Pulmonary Medicine.

 

INTERVAL HISTORY:  The patient is doing really well from a respiratory standpoint.  She remains on ro
om air.  She denies any shortness of breath, cough or sputum changes.  She is a little sleepy right n
ow, but wakes up easily.  There have been no overnight events.

 

PHYSICAL EXAMINATION:

VITAL SIGNS:  Afebrile, pulse 89, blood pressure 148/50, respirations 22, saturation 98% on room air.


GENERAL:  The patient is somnolent, but wakes up easily.  She will stay awake longer than 10 seconds 
without stimulation.

LUNGS:  Excellent air entry.  There is no prolonged expiratory phase, wheezing, rhonchi or crackles.

HEART:  Normal rate, regular.

ABDOMEN:  Soft, nontender, nondistended.  Bowel sounds are positive.

MUSCULOSKELETAL:  No cyanosis or clubbing.  No pitting in the bilateral lower extremities.

NEUROLOGIC:  Grossly nonfocal.

 

LABORATORY DATA:  WBC 10.1, hemoglobin 8.5, platelets 302,000.  Band count is down trending to 18% wi
th a neutrophil count that has improved to 60%.  BUN 48, creatinine 0.7.

 

ASSESSMENT:

1.  Enterocutaneous fistula following laparotomy.

2.  Deconditioning.

3.  TPN.

 

DISCUSSION AND PLAN:  The patient is looking a little on the dry side.  She has skin tenting.  She do
es not have any crackles of the lungs.  I will introduce a little bit of half normal saline to comple
ment her TPN, which is currently running at a very low rate.  Pulmonary Critical Care will continue t
o follow while the patient remains in this location.  We will continue focusing efforts on mobilizing
 the patient so as to prevent her from getting too much more deconditioned during this hospital stay.

## 2018-03-03 NOTE — PRG
DATE OF SERVICE:  03/03/2018

 

ATTENDING PHYSICIAN:  Dr. Treadwell.

 

SUBJECTIVE:  Ms. Esthela Edwards is a patient of Dr. Car who was in the [facility].  She has an enterocuta
neous fistula.  She had her wound VAC changed yesterday, but refused to have it replaced and instead 
wet to dry dressings were put on.  This afternoon on exam, she denies any abdominal pain, nausea or v
omiting.  She voices no complaints.

 

OBJECTIVE:

VITAL SIGNS:  Blood pressure 126/50, pulse 80, temperature 99.5, respirations 20 and O2 sat 100% on r
oom air.

GENERAL APPEARANCE:  An elderly adult female sitting in bed.  She has an NG tube in place.  She does 
not appear to be in any acute distress.

HEENT:  She is normocephalic and atraumatic.

RESPIRATORY:  Her lungs are clear to auscultation bilaterally.

CARDIAC:  She has regular rate and rhythm without murmurs or gallops.

ABDOMEN:  Soft and obese, but nondistended.  She has absent bowel sounds.  Wet-to-dry dressing in rolanda
ce.

 

LABORATORY DATA:  Hematology:  WBC is 10.1, hemoglobin 8.5, hematocrit 25.9 and platelets 302.  Chemi
stry:  Sodium 137, potassium 3.9, chloride 102, bicarbonate 30, BUN 48, creatinine 0.70 and glucose 2
15.  Hemoglobin A1c 8.4.

 

IMAGING DATA:  There are no images to review today.

 

ASSESSMENT:  Enterocutaneous fistula.

 

PLAN:

1.  Continue TPN and Sandostatin.  NG tube was cleared at bedside.  She has put out roughly 300 mL ov
ernight, compared with 550 yesterday.  Continue NG tube decompression.

2.  Continue insulin and TPN as ordered and continue to follow glucose with Accu-Cheks.

3.  Continue PT and OT as able.

 

This patient was seen and discussed along with Dr. Treadwell, who agrees with the assessment and plan.

## 2018-03-04 LAB
ANION GAP SERPL CALC-SCNC: 10 MMOL/L (ref 10–20)
BUN SERPL-MCNC: 44 MG/DL (ref 9.8–20.1)
CALCIUM SERPL-MCNC: 8.5 MG/DL (ref 7.8–10.44)
CHLORIDE SERPL-SCNC: 102 MMOL/L (ref 98–107)
CO2 SERPL-SCNC: 30 MMOL/L (ref 23–31)
CREAT CL PREDICTED SERPL C-G-VRATE: 81 ML/MIN (ref 70–130)
GLUCOSE SERPL-MCNC: 181 MG/DL (ref 83–110)
MAGNESIUM SERPL-MCNC: 1.3 MG/DL (ref 1.6–2.6)
POTASSIUM SERPL-SCNC: 3.8 MMOL/L (ref 3.5–5.1)
SODIUM SERPL-SCNC: 138 MMOL/L (ref 136–145)

## 2018-03-04 RX ADMIN — Medication SCH ML: at 08:11

## 2018-03-04 RX ADMIN — CLOTRIMAZOLE SCH APPLIC: 1 CREAM TOPICAL at 20:53

## 2018-03-04 RX ADMIN — Medication PRN MLS: at 06:41

## 2018-03-04 RX ADMIN — Medication SCH ML: at 20:53

## 2018-03-04 RX ADMIN — INSULIN LISPRO PRN UNIT: 100 INJECTION, SOLUTION INTRAVENOUS; SUBCUTANEOUS at 05:48

## 2018-03-04 RX ADMIN — INSULIN LISPRO PRN UNIT: 100 INJECTION, SOLUTION INTRAVENOUS; SUBCUTANEOUS at 11:31

## 2018-03-04 RX ADMIN — DEXTROSE MONOHYDRATE SCH MLS: 70 INJECTION, SOLUTION INTRAVENOUS at 14:12

## 2018-03-04 RX ADMIN — INSULIN LISPRO PRN UNIT: 100 INJECTION, SOLUTION INTRAVENOUS; SUBCUTANEOUS at 22:07

## 2018-03-04 NOTE — PRG
DATE OF SERVICE:  03/04/2018

 

SERVICE:  Pulmonary Medicine.

 

INTERVAL HISTORY:  The patient is doing fine from a respiratory standpoint.  She remains on room air.
  She denies any chest pain, fevers, chills, nausea, vomiting.  Otherwise, she remains in her usual s
felipe of health.  She is extraordinarily sleepy today.  That being said, she wakes up just fine.  She 
is working with physical therapy, but does not like to get out of bed.

 

PHYSICAL EXAMINATION:

VITAL SIGNS:  Afebrile currently with a T-max of 100.5, pulse 100, blood pressure 178/80, respiration
s 22, saturation 98% on room air.

GENERAL:  The patient is awake and alert, in no apparent distress.

LUNGS:  Decent air entry.  There is no prolonged expiratory phase or wheezing present.

HEART:  Normal rate, regular.

ABDOMEN:  Soft, nontender, nondistended.  Bowel sounds positive.

MUSCULOSKELETAL:  No cyanosis or clubbing.  No pitting in the bilateral lower extremities.

SKIN:  Tenting is improved.

GENITOURINARY:  Moran catheter in place.

NEUROLOGIC:  Grossly nonfocal.

 

LABORATORY DATA:  BUN is gently down trending.  Creatinine 0.67 and stable.  Basic metabolic profile 
is otherwise unremarkable.  Magnesium 1.3, phosphorus 3.4.

 

ASSESSMENT:

1.  Enterocutaneous fistula following laparotomy.

2.  Deconditioning, severe.

3.  Total parenteral nutrition.

 

PLAN:  We will replace the patient's magnesium.  The patient is doing well from a respiratory standpo
int.  Pulmonary will continue to follow intermittently while the patient remains in IMCU.  Dr. Tinoco 
will resume care in the morning.

## 2018-03-04 NOTE — PRG
DATE OF SERVICE:  03/04/2018

 

Ms. Edwards states that she is feeling okay today.  T-max was 100.5, blood pressure is mildly elevated 
at 157/56.  Fingersticks have ranged from 144-241.  Her wound is somewhat paler compared to yesterday
.  The patient had her VAC dressing removed and did not want it replaced at that time, so a wet-to-dr
y dressing was done.  However, the skin around the wound is looking slightly red and irritated, and t
here is plaque on the gauze dressing on the skin.  Fistula is visible in the base of the inferior wou
nd.  The upper wound is clean.  She has some partial thickness decubitus ulcers of the sacral area, w
hich are clean.  She has a yeast like rash in the perineum.  Her abdomen is soft and quiet, but not d
istended.  NG output is light green in color.

 

ASSESSMENT:  Small-bowel obstruction, status post lysis of adhesions and small bowel resection.  Unfo
rtunately, developing severe postoperative ileus and then an enterocutaneous fistula.  The fistula is
 not very high volume.  I have recommended replacing the VAC dressing.  The patient and her son are o
pen to this, so the VAC was replaced by the Wound Care team.  I think this will be better for the per
iwound area.  I have also ordered some Lotrisone cream for the perineal rash, and at the skin, around
 the wounds starts to look more fungal, they can use that there as well.  We are continuing TPN, lionel
l rest, and NG decompression, and somatostatin to try to allow the fistula to heal on its own.

## 2018-03-05 LAB
ANION GAP SERPL CALC-SCNC: 10 MMOL/L (ref 10–20)
BUN SERPL-MCNC: 41 MG/DL (ref 9.8–20.1)
CALCIUM SERPL-MCNC: 8.5 MG/DL (ref 7.8–10.44)
CHLORIDE SERPL-SCNC: 102 MMOL/L (ref 98–107)
CO2 SERPL-SCNC: 30 MMOL/L (ref 23–31)
CREAT CL PREDICTED SERPL C-G-VRATE: 86 ML/MIN (ref 70–130)
GLUCOSE SERPL-MCNC: 149 MG/DL (ref 83–110)
MAGNESIUM SERPL-MCNC: 2.1 MG/DL (ref 1.6–2.6)
POTASSIUM SERPL-SCNC: 3.5 MMOL/L (ref 3.5–5.1)
SODIUM SERPL-SCNC: 138 MMOL/L (ref 136–145)

## 2018-03-05 RX ADMIN — INSULIN LISPRO PRN UNIT: 100 INJECTION, SOLUTION INTRAVENOUS; SUBCUTANEOUS at 12:25

## 2018-03-05 RX ADMIN — CLOTRIMAZOLE SCH APPLIC: 1 CREAM TOPICAL at 08:17

## 2018-03-05 RX ADMIN — DEXTROSE MONOHYDRATE SCH MLS: 70 INJECTION, SOLUTION INTRAVENOUS at 14:07

## 2018-03-05 RX ADMIN — CLOTRIMAZOLE SCH APPLIC: 1 CREAM TOPICAL at 20:55

## 2018-03-05 RX ADMIN — Medication SCH ML: at 20:52

## 2018-03-05 RX ADMIN — Medication SCH ML: at 08:16

## 2018-03-05 RX ADMIN — INSULIN LISPRO PRN UNIT: 100 INJECTION, SOLUTION INTRAVENOUS; SUBCUTANEOUS at 16:27

## 2018-03-05 NOTE — PRG
DATE OF SERVICE:  03/05/2018

 

PHYSICAL EXAMINATION: 

VITAL SIGNS:  Ms. Edwards is afebrile, heart rate is 76, respiratory rate is 20, oximetry is 97, blood 
pressure 171/60.  She is still on room air.  Still has her NG tube in place.  

LUNGS:  Her lungs are clear.

HEART:  Regular rhythm.  S1 and S2 are normal.  

ABDOMEN:  Abdomen is soft.

 

LABORATORY:  Sodium 130, potassium 3.5, chloride 102, bicarbonate 30, BUN 41, creatinine 0.64.  

 

Intake and output is reviewed.  her gastric drainage is 350 mL over 24 hours.  There is no record of 
the amount of drainage in her wound VAC.

 

IMPRESSION:

1.  Enterocutaneous fistula after laparotomy

2.  Extreme deconditioning with advanced age.

 

PLAN:  Continue supportive care, nutritional support, wound care.

## 2018-03-05 NOTE — PRG
DATE OF SERVICE:  03/05/2018

 

Esthela Edwards is in IMCU.  She is status post 02/14/2018 4.5 hour adhesiolysis, segmental resection o
f jejunum and ileum with anastomosis.  She has had problems, atrial fibrillation and atrial flutter, 
controlled medically parenterally under Dr. Levine's guidance.  

 

PHYSICAL EXAMINATION:

VITAL SIGNS:  Temperature 99.2 degrees, 76, 20.  NG tube output 350 mL, urine output good through her
 Moran. 

GENERAL:   She is up in a chair this morning.  Nurses have been doing a good job IM getting her in 
a chair several times a day, sometimes she takes the initiative, sometimes she is resistant.  

 

LABORATORY:  Hemoglobin 8.5 2 days ago, white count 10.1 two days ago.  Basic metabolic profile unrem
arkable.  

 

She has been afebrile.  She had a temperature of 100.5 degrees on 03/04/2018, but otherwise she has b
een 98-99 degrees.  Last CAT scan obtained 02/28/2018 revealed changes of the fistula, but no intra-a
bdominal fluid collections.

 

LUNGS:  Clear to auscultation.

CARDIAC:  Regular rate and rhythm without murmur or gallop.

ABDOMEN:  Soft, occasional bowel sounds.  No bowel movement reported.

EXTREMITIES:  Unremarkable.  No ankle edema.

 

Over the weekend the patient refused a wound VAC.  Dr. Treadwell saw the wound on 2 consecutive days.  
She appreciated the fistula.  The fistula had low volume output during the time, the wound VAC was of
f for 2 days over the weekend.  She had a gauze dressing that was changed only once so that her outpu
t from her fistula is low volume.

 

ASSESSMENT AND PLAN:  

1.  Enterocutaneous fistula after a 4-1/2 hour adhesiolysis and 2 segmental small bowel resections pe
rformed 02/14/2018.  Would continue IV TPN, bowel rest, NG tube decompression and Sandostatin.  We wi
ll obtain a 2-view abdomen, 2 view chest x-ray tomorrow as possible considering her diminished mobili
ty.  Continue Sandostatin.  NG tube treatment.

2.  DNR status.

3.  Poor mobility.  Continue physical therapy and nursing efforts for mobility up in a chair.

4.  Check laboratories in the morning.

5.  Consider wound VAC changes twice a week, instead of 3 times a week.

## 2018-03-06 LAB
ALBUMIN SERPL BCG-MCNC: 1.9 G/DL (ref 3.4–4.8)
ALP SERPL-CCNC: 195 U/L (ref 40–150)
ALT SERPL W P-5'-P-CCNC: 16 U/L (ref 8–55)
ANION GAP SERPL CALC-SCNC: 11 MMOL/L (ref 10–20)
AST SERPL-CCNC: 29 U/L (ref 5–34)
BASOPHILS # BLD AUTO: 0 THOU/UL (ref 0–0.2)
BASOPHILS NFR BLD AUTO: 0.1 % (ref 0–1)
BILIRUB SERPL-MCNC: 0.3 MG/DL (ref 0.2–1.2)
BUN SERPL-MCNC: 40 MG/DL (ref 9.8–20.1)
CALCIUM SERPL-MCNC: 8.2 MG/DL (ref 7.8–10.44)
CHLORIDE SERPL-SCNC: 100 MMOL/L (ref 98–107)
CO2 SERPL-SCNC: 30 MMOL/L (ref 23–31)
CREAT CL PREDICTED SERPL C-G-VRATE: 83 ML/MIN (ref 70–130)
EOSINOPHIL # BLD AUTO: 0.1 THOU/UL (ref 0–0.7)
EOSINOPHIL NFR BLD AUTO: 1.1 % (ref 0–10)
GLOBULIN SER CALC-MCNC: 4.1 G/DL (ref 2.4–3.5)
GLUCOSE SERPL-MCNC: 155 MG/DL (ref 83–110)
HGB BLD-MCNC: 7.6 G/DL (ref 12–16)
LYMPHOCYTES # BLD: 1.6 THOU/UL (ref 1.2–3.4)
LYMPHOCYTES NFR BLD AUTO: 16.5 % (ref 21–51)
MAGNESIUM SERPL-MCNC: 1.6 MG/DL (ref 1.6–2.6)
MCH RBC QN AUTO: 30.8 PG (ref 27–31)
MCV RBC AUTO: 93.4 FL (ref 81–99)
MONOCYTES # BLD AUTO: 0.4 THOU/UL (ref 0.11–0.59)
MONOCYTES NFR BLD AUTO: 4.3 % (ref 0–10)
NEUTROPHILS # BLD AUTO: 7.4 THOU/UL (ref 1.4–6.5)
NEUTROPHILS NFR BLD AUTO: 78 % (ref 42–75)
PLATELET # BLD AUTO: 256 THOU/UL (ref 130–400)
POTASSIUM SERPL-SCNC: 3.7 MMOL/L (ref 3.5–5.1)
RBC # BLD AUTO: 2.48 MILL/UL (ref 4.2–5.4)
SODIUM SERPL-SCNC: 137 MMOL/L (ref 136–145)
WBC # BLD AUTO: 9.5 THOU/UL (ref 4.8–10.8)

## 2018-03-06 RX ADMIN — INSULIN LISPRO PRN UNIT: 100 INJECTION, SOLUTION INTRAVENOUS; SUBCUTANEOUS at 16:48

## 2018-03-06 RX ADMIN — INSULIN LISPRO PRN UNIT: 100 INJECTION, SOLUTION INTRAVENOUS; SUBCUTANEOUS at 22:21

## 2018-03-06 RX ADMIN — INSULIN LISPRO PRN UNIT: 100 INJECTION, SOLUTION INTRAVENOUS; SUBCUTANEOUS at 04:29

## 2018-03-06 RX ADMIN — Medication SCH ML: at 09:42

## 2018-03-06 RX ADMIN — CLOTRIMAZOLE SCH APPLIC: 1 CREAM TOPICAL at 09:49

## 2018-03-06 RX ADMIN — DEXTROSE MONOHYDRATE SCH MLS: 70 INJECTION, SOLUTION INTRAVENOUS at 14:57

## 2018-03-06 RX ADMIN — CLOTRIMAZOLE SCH APPLIC: 1 CREAM TOPICAL at 21:21

## 2018-03-06 RX ADMIN — Medication SCH ML: at 21:24

## 2018-03-06 NOTE — PRG
DATE OF SERVICE:  03/06/2018

 

SUBJECTIVE:  Ms. Edwards has no new complaints.  She is tired of being in bed.  She did sit up for a wh
ile today.

 

OBJECTIVE:

VITAL SIGNS:  She is afebrile, heart rate is 78, blood pressure 150/55, respiratory rate is 22, oxime
try is 97 on room air.

LUNGS:  Clear.

HEART:  Regular rhythm.

ABDOMEN:  Soft.

 

Gastric drainage is 800 mL overnight.  Her wound VAC drainage was 80 mL in the last 24 hours.

 

IMPRESSION:

1.  Enterocutaneous fistula.

2.  Obesity.

3.  Deconditioning.

 

PLAN:  Continue care.  TPN.  NG suction.  Wound care.

## 2018-03-06 NOTE — PRG
DATE OF SERVICE:  03/06/2018

 

SUBJECTIVE:  Ms. Edwards is doing well today.  She is on IMCU.  Her atrial fibrillation and flutter is 
well controlled medically by Dr. Levine.

 

OBJECTIVE:

VITAL SIGNS:  98.5 degrees, 82, 152/45.  Gastric output over 24 hours, NG tube 800 mL.  Urine output 
is good.

LUNGS:  Clear to auscultation.

CARDIAC:  Regular rate and rhythm without murmur or gallop.

ABDOMEN:  Soft, bowel sounds present.  Wound VAC in place.

 

IMAGING:  Abdominal x-rays reveal gas in her colon, nonspecific bowel gas pattern.  Chest x-ray Unrem
arkable.

 

LABORATORY DATA:  This morning, white count 9.5, hemoglobin 7.6.  Basic metabolic profile was normal.
  Glucose is under good control.  Accu-Cheks 150-130.  Her magnesium and phosphorus are normal.  Prea
lbumin is low 13.

 

ASSESSMENT AND PLAN:

1.  Ileus.  Continue Sandostatin.  NG tube, n.p.o., TPN and supportive care.

2.  Deconditioning.  Continue up out of bed.  Nurses are doing an excellent job, getting her up in 
e chair.

3.  Magnesium and phosphorus are good.  Electrolytes are normal.  Accu-Cheks are good control with cu
rrent regimen insulin in her TPN.  Consider small bowel follow through this week pending clinical cou
rse.

4.  View her open wound tomorrow and evaluate her fistula drainage output in the canister is low.  Kiara traylor has enterocutaneous fistula with low output.

## 2018-03-06 NOTE — RAD
RADIOGRAPH CHEST 1 VIEW:

 

Date: 3-6-18

Time: 8:54 a.m.

 

HISTORY: 

89-year-old female with COPD. 

 

COMPARISON: 

2-21-18

 

FINDINGS:

NG tube, left subclavian central vascular catheter and right total glenohumeral joint replacement art
hroplasty hardware, are again noted. Atherosclerosis of the thoracic aorta. No cardiomegaly. There is
 pulmonary vascular engorgement. No airspace opacity, holly pulmonary edema, or pneumothorax. Lateral
 costophrenic angles are sharp. Diffuse osteopenia. No interval change. 

 

IMPRESSION:

1. Nasogastric tube and left subclavian central venous catheter. 

2. Status post total right glenohumeral joint replacement arthroplasty. 

3. Diffuse osteopenia. 

4. Mild pulmonary vascular engorgement without pulmonary edema. 

5. No interval change since 2-21-18.

 

 

JN 

 

POS: [facility]

## 2018-03-06 NOTE — PQF
LILA HERNANDEZ RICHARD D MD

[id number]                                                             CCU-C10

[id number]                             

                                   

CLINICAL DOCUMENTATION IMPROVEMENT CLARIFICATION FORM:  ICD-10 Updated



PLEASE DO AN ADDENDUM TO THE PROGRESS NOTE WITH ANY DOCUMENTATION UPDATES OR 
ADDITIONS AND CARRY THROUGH TO DC SUMMARY.   THANK YOU.



DATE:    3-6-18                                        ATTN:   DR. CAMPOS



Please exercise your independent, professional judgment in responding to the 
clarification form. 

Clinical indicators are provided on the bottom of this form for your review





Please check appropriate box(s):

[  ] ILEUS related to current/recent surgery - complication

[  ] ILEUS NOT related to current/recent surgery - Not complication

[  ] Other diagnosis ___________

[  ] Unable to determine





CLINICAL INDICATORS - SIGNS / SYMPTOMS / LABS

PN 2-22 DR. CAMPOS - ILEUS AFTER 4 HR ADHESIOLYSIS AND BOWEL RESECTION 

PN 2-23 DR. SPRINGER -  PROLONGED ILEUS

PN 3-4 DR. BOATENG - POSTOPERATIVE ILEUS 





RISK FACTORS

H&P:     SBO FROM PRIOR SURGERY

2-14-18     4 1/2 HR ADHESIOLYSIS AND 2 SEGMENTAL SMALL BOWEL RESECTIONS

2-17          SEVERE CHRONIC BACK PAIN ON NARCOTICS AT HOME



TREATMENT:

NGT

PN 2-19:  START TPN  



THANK YOU,

SUE



(This form is maintained as a part of the permanent medical record)

 2015 [employer].  All Rights Reserved

Sue Velez RN, BS    [email or web address]    Cell Phone:  [phone]

                                                              

[facility]

## 2018-03-06 NOTE — PQF
LILA HERNANDEZ RICHARD D MD

[id number]                                                             CCU-C10

[id number]                             

                                   

CLINICAL DOCUMENTATION IMPROVEMENT CLARIFICATION FORM:  ICD-10 Updated



PLEASE DO AN ADDENDUM TO THE PROGRESS NOTE WITH ANY DOCUMENTATION UPDATES OR 
ADDITIONS AND CARRY THROUGH TO DC SUMMARY.   THANK YOU.



Date:     3-6-18                                       ATTN:  DR. CAMPOS



Please exercise your independent, professional judgment in responding to the 
clarification form. 

Clinical indicators are provided on the bottom of this form for your review



Please check appropriate box(s):

[  ] Protein Calorie Malnutrition:    [  ] Mild      [  ] Moderate   [  ] 
Severe   

[  ] Other Malnutrition (please specify) _______________________________________
__

[  ] Underweight without malnutrition

[  ] Cachexia 

[  ] Other diagnosis ___________

[  ] Unable to determine





CLINICAL INDICATORS - SIGNS / SYMPTOMS / LABS

BMI of 33.5



PN 2-25 DR. CAMPOS:  MALNUTRITION



NUTRITION ASSESSMENT 3-2:    % WEIGHT CHANGE:    +11% since admit

                                                   1+ PITTING EDEMA BLE

                                                   STAGE 2 PU BUTTOCK

PN 3-1 BETH:  ILEUS ; DECONDITIONING 



RISK FACTORS

PN 3-5 BETH:  4 1/2 HRS ADHESIOLYSIS AND 2 SEGMENTAL SMALL BOWEL RESECTIONS  2
-14-18

PN 3-1 BETH:  ILEUS ; DECONDITIONING 



TREATMENT:

MAR - TPN  2-21 TO  3-5





Moderate Malnutrition (in acute illness)

Energy Intake: <75% of estimated energy requirement for > 7 days

Weight Loss:  1-2%/1 week;  5%/ 1 month; 7.5%/3 months

Other: mild body fat loss; mild muscle mass loss; mild fluid accumulation; 



Severe Malnutrition (in acute illness)

Energy Intake: < 50% of estimated energy requirement for > 5 days

Weight Loss: >1-2%/1 week; >5%/1 month; >7.5%/3 months

Other: moderate body fat loss; moderate muscle mass loss; moderate- severe 
fluid accumulation; measurably reduced  strength



Moderate Malnutrition (in chronic illness)

Energy Intake: <75% of estimated energy requirement for >1 month

Weight Loss: 5%/1 month; 7.5%/3 months; 10%/6 months; 20%/1 year

Other: mild body fat loss; mild muscle mass loss; mild fluid accumulation



Severe Malnutrition (in chronic illness)

Energy Intake: <75% of estimated energy requirement for >1 month

Weight Loss: >5%/1 month; >7.5%/3 months; >10%/6 months; >20%/1 year

Other: severe body fat loss; severe muscle mass loss; severe fluid accumulation
; measurably reduced  strength





THANK YOU,

SUE



(This form is maintained as a part of the permanent medical record)

 2015 [email or web address].  All Rights Reserved

Sue Velez RN, BS    [email or web address]    Cell Phone:  [phone]

                                                              

[facility]

## 2018-03-07 LAB
ANION GAP SERPL CALC-SCNC: 9 MMOL/L (ref 10–20)
BUN SERPL-MCNC: 40 MG/DL (ref 9.8–20.1)
CALCIUM SERPL-MCNC: 8.4 MG/DL (ref 7.8–10.44)
CHLORIDE SERPL-SCNC: 99 MMOL/L (ref 98–107)
CO2 SERPL-SCNC: 33 MMOL/L (ref 23–31)
CREAT CL PREDICTED SERPL C-G-VRATE: 89 ML/MIN (ref 70–130)
GLUCOSE SERPL-MCNC: 108 MG/DL (ref 83–110)
MAGNESIUM SERPL-MCNC: 1.4 MG/DL (ref 1.6–2.6)
POTASSIUM SERPL-SCNC: 3.6 MMOL/L (ref 3.5–5.1)
SODIUM SERPL-SCNC: 137 MMOL/L (ref 136–145)

## 2018-03-07 PROCEDURE — 02HV33Z INSERTION OF INFUSION DEVICE INTO SUPERIOR VENA CAVA, PERCUTANEOUS APPROACH: ICD-10-PCS | Performed by: SURGERY

## 2018-03-07 RX ADMIN — Medication SCH ML: at 20:41

## 2018-03-07 RX ADMIN — Medication SCH ML: at 08:09

## 2018-03-07 RX ADMIN — Medication PRN ML: at 13:46

## 2018-03-07 RX ADMIN — FLUCONAZOLE SCH MLS: 2 INJECTION, SOLUTION INTRAVENOUS at 20:39

## 2018-03-07 RX ADMIN — CLOTRIMAZOLE SCH APPLIC: 1 CREAM TOPICAL at 20:42

## 2018-03-07 RX ADMIN — INSULIN LISPRO PRN UNIT: 100 INJECTION, SOLUTION INTRAVENOUS; SUBCUTANEOUS at 23:08

## 2018-03-07 RX ADMIN — CLOTRIMAZOLE SCH APPLIC: 1 CREAM TOPICAL at 08:09

## 2018-03-07 RX ADMIN — DEXTROSE MONOHYDRATE SCH MLS: 70 INJECTION, SOLUTION INTRAVENOUS at 16:27

## 2018-03-07 NOTE — PRG
DATE OF SERVICE:  03/07/2018

 

SUBJECTIVE:  Ms. Edwards is stable.

 

OBJECTIVE:

VITAL SIGNS:  Temperature is 99, heart rate is 82, respiratory rate is 20, oximetry is 96 on room air
, blood pressure is elevated earlier at 188/110.  Follow up blood pressure is 163/68.  LUNGS:  Remain
ed clear.

HEART:  Unchanged.

ABDOMEN:  Unchanged.

EXTREMITIES:  Unchanged.

 

LABORATORY DATA:  Sodium 137, potassium 3.6, chloride 99, bicarbonate 32, BUN 40, creatinine 0.62.

 

IMPRESSION:

1.  Ileus.

2.  Deconditioning.

3.  Enterocutaneous fistula.

4.  Advanced age.

5.  DO NOT RESUSCITATE status.

 

PLAN:  Continue current care per General Surgery.

## 2018-03-07 NOTE — PQF
LILA HERNANDEZ RICHARD D MD

[id number]                                                             CCU-C10

[id number]                             

                                   

CLINICAL DOCUMENTATION IMPROVEMENT CLARIFICATION FORM:  ICD-10 Updated



PLEASE DO AN ADDENDUM TO THE PROGRESS NOTE WITH ANY DOCUMENTATION UPDATES OR 
ADDITIONS AND CARRY THROUGH TO DC SUMMARY.   THANK YOU.



Date:     3-6-18                                       ATTN:  DR. CAMPOS



Please exercise your independent, professional judgment in responding to the 
clarification form. 

Clinical indicators are provided on the bottom of this form for your review



Please check appropriate box(s):

[  ] Protein Calorie Malnutrition:    [  ] Mild      [  ] Moderate   [  ] 
Severe   

[  ] Other Malnutrition (please specify) _______________________________________
__

[  ] Underweight without malnutrition

[  ] Cachexia 

[  ] Other diagnosis ___________

[  ] Unable to determine



In addition, please specify:

Present on Admission (POA):  [  ] Yes             [  ] No             [  ] 
Unable to determine



CLINICAL INDICATORS - SIGNS / SYMPTOMS / LABS

BMI of 33.5



PN 2-25 DR. CAMPOS:  MALNUTRITION



NUTRITION ASSESSMENT 3-2:    % WEIGHT CHANGE:    +11% since admit

                                                   1+ PITTING EDEMA BLE

                                                   STAGE 2 PU BUTTOCK



RISK FACTORS

PN 3-5 BETH:  4 1/2 HRS ADHESIOLYSIS AND 2 SEGMENTAL SMALL BOWEL RESECTIONS  2
-14-18

PN 3-1 BETH:  ILEUS ; DECONDITIONING 





TREATMENT:

MAR - TPN  2-21 TO  3-5



Moderate Malnutrition (in acute illness)

Energy Intake: <75% of estimated energy requirement for > 7 days

Weight Loss:  1-2%/1 week;  5%/ 1 month; 7.5%/3 months

Other: mild body fat loss; mild muscle mass loss; mild fluid accumulation; 

Severe Malnutrition (in acute illness)

Energy Intake: < 50% of estimated energy requirement for > 5 days

Weight Loss: >1-2%/1 week; >5%/1 month; >7.5%/3 months

Other: moderate body fat loss; moderate muscle mass loss; moderate- severe 
fluid accumulation; measurably reduced  strength

Moderate Malnutrition (in chronic illness)

Energy Intake: <75% of estimated energy requirement for >1 month

Weight Loss: 5%/1 month; 7.5%/3 months; 10%/6 months; 20%/1 year

Other: mild body fat loss; mild muscle mass loss; mild fluid accumulation

Severe Malnutrition (in chronic illness)

Energy Intake: <75% of estimated energy requirement for >1 month

Weight Loss: >5%/1 month; >7.5%/3 months; >10%/6 months; >20%/1 year

Other: severe body fat loss; severe muscle mass loss; severe fluid accumulation
; measurably reduced  strength



THANK  YOU,

SUE



(This form is maintained as a part of the permanent medical record)

 2015 [employer].  All Rights Reserved

Sue Velez RN, BS    [email or web address]    Cell Phone:  [phone]

                                                              

[facility]

## 2018-03-07 NOTE — RAD
SUPINE ABDOMEN:

3/7/18

 

HISTORY: 

Assessing G-tube placement. 

 

FINDINGS/IMPRESSION:  

NG tube has been placed. The tube passes through the EG junction and the tip of the tube coils in the
 mid gastric fundus with the tip of the tube projecting into the upper gastric fundus. 

 

Bowel gas pattern appears unremarkable. 

 

POS: [facility]

## 2018-03-07 NOTE — PRG
DATE OF SERVICE:  03/07/2018

 

SUBJECTIVE:  Esthela Edwards is doing well.  She is awake and has been up in a chair, although she is n
ot always cooperative in getting into the chair.

 

PHYSICAL EXAMINATION:

VITAL SIGNS:  98.4 degrees, 98, 188/110.

LUNGS:  Clear to auscultation.

CARDIAC:  Regular rate and rhythm without murmur or gallop.

ABDOMEN:  Soft.  Minimal bowel sounds.

 

ASSESSMENT AND PLAN:

1.  Abdominal wound granulating, upper wound.  Lower wound, minimal drainage.  Moran catheter has sed
iment.  We will remove the Moran catheter today and try to leave it out.  Central line has been in so
me time.  We will arrange a PICC line and remove her central line.  She has been experiencing some an
xiety.  We will add Ativan.  Overall, nasogastric tube output is 400-600.  We will consider small bow
el follow-through tomorrow.  Continue Sandostatin, TPN, bowel rest, NG tube, allowing fistula resolve
.  Small bowel follow-through, Gastrografin in the next 1 or 2 days.

2.  Deconditioning.  She will need LTAC.

## 2018-03-07 NOTE — SPC
PROCEDURE: 

Peripheral insertion central catheter. 

 

INDICATION:

Need for chronic IV treatment. 

 

FINDINGS:  

Dual lumen 5 [place] PICC line placed into the right basilic vein using ultrasound guidance and fluoro
scopic confirmation. The tip is positioned in the SVC.

 

PROCEDURE NOTE:

Right upper extremity is prepped and draped in the sterile manner. Ultrasound was used to assess veno
us structures. Basilic vein in mid to upper humerus was chosen for puncture. Local anesthesia was giv
en with lidocaine and bicarb. This vein was punctured using a micropuncture technique under ultrasoun
d guidance. Wire was advanced into the vein and the tip of the wire was positioned in the SVC. Cathet
er length was then measured and cut. Sheath was placed over the wire. The catheter was then advanced 
over the wire. Peel away sheaths were removed. The wire removed. The catheter was flushed and secured
 with a sterile dressing. There were no problems. 

 

POS: [facility]

## 2018-03-07 NOTE — PQF
LILA HERNANDEZ RICHARD D MD

[id number]                                                             CCU-C10

[id number]                             

                                   

CLINICAL DOCUMENTATION IMPROVEMENT CLARIFICATION FORM:  ICD-10 Updated



PLEASE DO AN ADDENDUM TO THE PROGRESS NOTE WITH ANY DOCUMENTATION UPDATES OR 
ADDITIONS AND CARRY THROUGH TO DC SUMMARY.   THANK YOU.



DATE:    3-6-18                                        ATTN:   DR. CAMPOS



Please exercise your independent, professional judgment in responding to the 
clarification form. 

Clinical indicators are provided on the bottom of this form for your review



Please check appropriate box(s):

[  ] ILEUS related to current/recent surgery - complication

[  ] ILEUS NOT related to current/recent surgery - Not complication

[  ] Other diagnosis ___________

[  ] Unable to determine





CLINICAL INDICATORS - SIGNS / SYMPTOMS / LABS

PN 2-22 DR. CAMPOS - ILEUS AFTER 4 HR ADHESIOLYSIS AND BOWEL RESECTION 

PN 2-23 DR. SPRINGER - PROLONGED ILEUS

PN 3-4 DR. BOATENG - POSTOPERATIVE ILEUS 





RISK FACTORS

H&P:     SBO FROM PRIOR SURGERY

2-14-18   4 1/2 HR ADHESIOLYSIS AND 2 SEGMENTAL SMALL BOWEL RESECTIONS

2-17       SEVERE CHRONIC BACK PAIN ON NARCOTICS AT HOME



TREATMENT:

NGT

PN 2-19:  START TPN  



THANK YOU,

SUE



(This form is maintained as a part of the permanent medical record)

 2015 [employer].  All Rights Reserved

Sue Velez RN, BS    [email or web address]    Cell Phone:  [phone]

                                                              

[facility]

## 2018-03-08 LAB
ANION GAP SERPL CALC-SCNC: 19 MMOL/L (ref 10–20)
BUN SERPL-MCNC: 37 MG/DL (ref 9.8–20.1)
CALCIUM SERPL-MCNC: 8.6 MG/DL (ref 7.8–10.44)
CHLORIDE SERPL-SCNC: 86 MMOL/L (ref 98–107)
CO2 SERPL-SCNC: 25 MMOL/L (ref 23–31)
CREAT CL PREDICTED SERPL C-G-VRATE: 48 ML/MIN (ref 70–130)
GLUCOSE SERPL-MCNC: 165 MG/DL (ref 83–110)
MAGNESIUM SERPL-MCNC: 1.6 MG/DL (ref 1.6–2.6)
POTASSIUM SERPL-SCNC: 5.2 MMOL/L (ref 3.5–5.1)
SODIUM SERPL-SCNC: 125 MMOL/L (ref 136–145)

## 2018-03-08 RX ADMIN — INSULIN LISPRO PRN UNIT: 100 INJECTION, SOLUTION INTRAVENOUS; SUBCUTANEOUS at 16:37

## 2018-03-08 RX ADMIN — Medication SCH ML: at 08:44

## 2018-03-08 RX ADMIN — INSULIN LISPRO PRN UNIT: 100 INJECTION, SOLUTION INTRAVENOUS; SUBCUTANEOUS at 05:49

## 2018-03-08 RX ADMIN — INSULIN LISPRO PRN UNIT: 100 INJECTION, SOLUTION INTRAVENOUS; SUBCUTANEOUS at 22:22

## 2018-03-08 RX ADMIN — FLUCONAZOLE SCH MLS: 2 INJECTION, SOLUTION INTRAVENOUS at 20:29

## 2018-03-08 RX ADMIN — Medication SCH ML: at 21:04

## 2018-03-08 RX ADMIN — DEXTROSE MONOHYDRATE SCH MLS: 70 INJECTION, SOLUTION INTRAVENOUS at 15:39

## 2018-03-08 RX ADMIN — FLUCONAZOLE SCH MLS: 2 INJECTION, SOLUTION INTRAVENOUS at 11:46

## 2018-03-08 RX ADMIN — CLOTRIMAZOLE SCH APPLIC: 1 CREAM TOPICAL at 21:02

## 2018-03-08 RX ADMIN — CLOTRIMAZOLE SCH APPLIC: 1 CREAM TOPICAL at 11:50

## 2018-03-08 NOTE — RAD
GASTROGRAFIN SMALL BOWEL

3/8/18

 

HISTORY: 

Evaluate for bowel obstruction. 

 

FINDINGS:  

Initial supine  radiograph demonstrates a nasogastric tube in the epigastric region. Surgical cl
ips in the right hemiabdomen and superior right hemipelvis are noted. Scattered fecal material in a n
ondistended, nondilated colon. 

 

Suture chain is noted in the pelvis. 

 

Patient administered gastrografin. There are multiple dilated contrast filled loops of proximal and m
id small bowel. On the two and three hour images, there is minimal passage of contrast. The patient s
tarted having multiple episodes of emesis and the five hour image was obtained. There is still incomp
lete passage of oral contrast after five hours. Definite contrast opacifying the right hemicolon is n
ot appreciated. Findings are worrisome for a significant/high grade obstruction. 

 

IMPRESSION:  

Delayed passage of contrast. Possibility of a high grade obstruction cannot be excluded given delayed
 passage of contrast and dilated contrast filled loops of proximal and mid small bowel. 

 

POS: [facility]

## 2018-03-08 NOTE — PRG
SUBJECTIVE:  Esthela Edwards is in IMCU.  She has small bowel followthrough.  There was not much progre
ssion after 4 hours.  NG tube was placed back to suction and filled 3 canisters.  Overnight, she only
 had 150 mL for 24 hours out of her NG tube.  She has not passed any flatus or stool.

 

OBJECTIVE:

VITAL SIGNS:  Temperature 97.9, 91, 158/72.

GENERAL:  Yesterday, she had a PICC line, had her central line removed.

LUNGS:  Clear to auscultation.

CARDIAC:  Regular rate and rhythm without murmur or gallop.

ABDOMEN:  Soft, quiet.

EXTREMITIES:  Unremarkable.

 

ASSESSMENT AND PLAN:

1.  Enterocutaneous fistula, very little output.  Continue Sandostatin, NG tube, n.p.o., TPN.  Small 
bowel follow through did not show any progression after 4 hours.  We will repeat abdominal x-rays in 
the morning to follow this.  Continue NG tube.

2.  Deconditioning.  Physical therapy.

3.  NG tube changed yesterday.

4.  Continue Lovenox.

5.  Acute kidney injury, resolving with hydration and support.

6.  Before we send her to an LTAC, I would like to see some evidence of bowel recovery.

## 2018-03-08 NOTE — PRG
DATE OF SERVICE:  03/08/2018

 

She has no complaints.  Apparently, her wound is looking better after reviewing Dr. Car's note.  

 

PHYSICAL EXAMINATION: 

VITAL SIGNS:  She is afebrile.  Her vital signs remain stable.  

LUNGS:  Her lungs are clear.  

HEART:  Regular rate and rhythm, no murmur.

ABDOMEN:  Soft.

EXTREMITIES:  Without asymmetry or edema.

 

Intake and output is 2286 in, 1875 out.

 

Gastric drainage output was 300 mL.  Her wound VAC drainage was 25 mL.

 

IMPRESSION:

1.  Enterocutaneous fistula that clinically may be closing.  

2.  Prolonged ileus.

3.  Advanced age.

4.  Obesity and deconditioning.  

 

It is highly likely she will be bedridden the rest of her life in some type of fulltime care environm
ent.  Continue current care.  She is still intermittently receiving IV medications for hypertension, 
so she needs to stay in the Intermediate Care Unit from what I am told.

## 2018-03-09 LAB
ALBUMIN SERPL BCG-MCNC: 2.1 G/DL (ref 3.4–4.8)
ALP SERPL-CCNC: 193 U/L (ref 40–150)
ALT SERPL W P-5'-P-CCNC: 15 U/L (ref 8–55)
ANION GAP SERPL CALC-SCNC: 11 MMOL/L (ref 10–20)
AST SERPL-CCNC: 23 U/L (ref 5–34)
BASOPHILS # BLD AUTO: 0.1 THOU/UL (ref 0–0.2)
BASOPHILS NFR BLD AUTO: 0.5 % (ref 0–1)
BILIRUB DIRECT SERPL-MCNC: 0.2 MG/DL (ref 0.1–0.3)
BILIRUB SERPL-MCNC: 0.3 MG/DL (ref 0.2–1.2)
BUN SERPL-MCNC: 52 MG/DL (ref 9.8–20.1)
CALCIUM SERPL-MCNC: 8.5 MG/DL (ref 7.8–10.44)
CHLORIDE SERPL-SCNC: 98 MMOL/L (ref 98–107)
CO2 SERPL-SCNC: 34 MMOL/L (ref 23–31)
CREAT CL PREDICTED SERPL C-G-VRATE: 73 ML/MIN (ref 70–130)
EOSINOPHIL # BLD AUTO: 0.1 THOU/UL (ref 0–0.7)
EOSINOPHIL NFR BLD AUTO: 0.9 % (ref 0–10)
GLUCOSE SERPL-MCNC: 171 MG/DL (ref 83–110)
HGB BLD-MCNC: 7.7 G/DL (ref 12–16)
LYMPHOCYTES # BLD: 2.2 THOU/UL (ref 1.2–3.4)
LYMPHOCYTES NFR BLD AUTO: 20.9 % (ref 21–51)
MAGNESIUM SERPL-MCNC: 1.5 MG/DL (ref 1.6–2.6)
MCH RBC QN AUTO: 30.5 PG (ref 27–31)
MCV RBC AUTO: 93.5 FL (ref 81–99)
MONOCYTES # BLD AUTO: 0.4 THOU/UL (ref 0.11–0.59)
MONOCYTES NFR BLD AUTO: 3.8 % (ref 0–10)
NEUTROPHILS # BLD AUTO: 7.6 THOU/UL (ref 1.4–6.5)
NEUTROPHILS NFR BLD AUTO: 73.9 % (ref 42–75)
PLATELET # BLD AUTO: 311 THOU/UL (ref 130–400)
POTASSIUM SERPL-SCNC: 3.1 MMOL/L (ref 3.5–5.1)
RBC # BLD AUTO: 2.52 MILL/UL (ref 4.2–5.4)
SODIUM SERPL-SCNC: 140 MMOL/L (ref 136–145)
WBC # BLD AUTO: 10.3 THOU/UL (ref 4.8–10.8)

## 2018-03-09 RX ADMIN — Medication SCH ML: at 12:44

## 2018-03-09 RX ADMIN — INSULIN LISPRO PRN UNIT: 100 INJECTION, SOLUTION INTRAVENOUS; SUBCUTANEOUS at 17:38

## 2018-03-09 RX ADMIN — INSULIN LISPRO PRN UNIT: 100 INJECTION, SOLUTION INTRAVENOUS; SUBCUTANEOUS at 22:12

## 2018-03-09 RX ADMIN — FLUCONAZOLE SCH MLS: 2 INJECTION, SOLUTION INTRAVENOUS at 20:12

## 2018-03-09 RX ADMIN — INSULIN LISPRO PRN UNIT: 100 INJECTION, SOLUTION INTRAVENOUS; SUBCUTANEOUS at 05:49

## 2018-03-09 RX ADMIN — Medication SCH ML: at 21:19

## 2018-03-09 RX ADMIN — CLOTRIMAZOLE SCH APPLIC: 1 CREAM TOPICAL at 21:13

## 2018-03-09 RX ADMIN — CLOTRIMAZOLE SCH APPLIC: 1 CREAM TOPICAL at 12:45

## 2018-03-09 RX ADMIN — FLUCONAZOLE SCH MLS: 2 INJECTION, SOLUTION INTRAVENOUS at 09:30

## 2018-03-09 RX ADMIN — DEXTROSE MONOHYDRATE SCH MLS: 70 INJECTION, SOLUTION INTRAVENOUS at 14:11

## 2018-03-09 NOTE — PRG
DATE OF SERVICE:  03/09/2018

 

HISTORY:  She is about the same.  A small bowel follow through done yesterday evening showed findings
 worrisome for an obstruction.

 

Film done this morning did not show any contrast or specific bowel pattern.

 

She continues to be really the same, afebrile, heart rate 80, respiratory rate 24, oximetry is 97 on 
room air, blood pressure 151/56.  She started to feel like she is not going to survive and she is tir
ed of all this.  She has a fentanyl patch on.  It is not sure whether or not this is interfering with
 her bowel motility (i.e., opiate-induced bowel motility issues).

 

She has PRNs for hypertension.

 

She might do well with Catapres patch for control of her blood pressure.  Palliative Care is followin
g her as well.

## 2018-03-09 NOTE — PRG
DATE OF SERVICE:  03/09/2018

 

SUBJECTIVE:  Esthela Edwards is doing well today.  She has been up in a chair.  She is sometimes resist
ance, but nurses have insisted as I have.

 

OBJECTIVE:

VITAL SIGNS:  Temperature 97.5 degrees, 80, 151/56.

LUNGS:  Clear to auscultation.

CARDIAC:  Regular rate and rhythm without murmur or gallop.

ABDOMEN:  Soft.

 

LABORATORY DATA:  Hemoglobin is 7.7 this morning, which is stable.  White count is 10.3.  Her magnesi
um was down to 1.5.  She is given 1 gram of magnesium parenterally.  Potassium is low at 3.1.  She wa
s given potassium.

 

Her NG tube output overnight after it was rehooked up after a small bowel followthrough put out 3.3 l
iters.  Abdominal x-rays this morning obtained are nonspecific.  I do not see any contrast residual s
archie her NG tube has been placed back to suction.  There was a fecal matter in the right colon.  She 
has not had any bowel movement.

 

ASSESSMENT AND PLAN:

1.  Enterocutaneous fistula, seems to be improving.  Continue nasogastric tube, TPN, Sandostatin.

2.  Deconditioning.

3.  Open wound of abdomen.  Continue _____ Monday.

## 2018-03-09 NOTE — RAD
TWO VIEWS ABDOMEN:

 

HISTORY:

Followup small bowel obstruction.

 

COMPARISON:

Small bowel series from 03/08/2018.

 

FINDINGS:

Re-demonstration of a nasogastric tube.  Surgical staples are also redemonstrated.  Previously noted 
oral contrast is no longer seen.  There is fecal material and air attenuation in a nondistended colon
.  No obvious small bowel distention.  No definite pneumoperitoneum.

 

IMPRESSION:

Previously noted oral contrast is not appreciated and is presumed to have been absorbed by the alimen
tary canal.  The bowel gas pattern is nonspecific.  

 

POS: [facility]

## 2018-03-10 LAB
ANION GAP SERPL CALC-SCNC: 9 MMOL/L (ref 10–20)
BUN SERPL-MCNC: 43 MG/DL (ref 9.8–20.1)
CALCIUM SERPL-MCNC: 8.5 MG/DL (ref 7.8–10.44)
CHLORIDE SERPL-SCNC: 99 MMOL/L (ref 98–107)
CO2 SERPL-SCNC: 33 MMOL/L (ref 23–31)
CREAT CL PREDICTED SERPL C-G-VRATE: 85 ML/MIN (ref 70–130)
GLUCOSE SERPL-MCNC: 142 MG/DL (ref 83–110)
MAGNESIUM SERPL-MCNC: 1.6 MG/DL (ref 1.6–2.6)
POTASSIUM SERPL-SCNC: 3.3 MMOL/L (ref 3.5–5.1)
SODIUM SERPL-SCNC: 138 MMOL/L (ref 136–145)

## 2018-03-10 RX ADMIN — CLOTRIMAZOLE SCH APPLIC: 1 CREAM TOPICAL at 08:50

## 2018-03-10 RX ADMIN — Medication SCH ML: at 08:50

## 2018-03-10 RX ADMIN — DEXTROSE MONOHYDRATE SCH MLS: 70 INJECTION, SOLUTION INTRAVENOUS at 13:36

## 2018-03-10 RX ADMIN — FLUCONAZOLE SCH MLS: 2 INJECTION, SOLUTION INTRAVENOUS at 08:49

## 2018-03-10 RX ADMIN — INSULIN LISPRO PRN UNIT: 100 INJECTION, SOLUTION INTRAVENOUS; SUBCUTANEOUS at 13:38

## 2018-03-10 RX ADMIN — INSULIN LISPRO PRN UNIT: 100 INJECTION, SOLUTION INTRAVENOUS; SUBCUTANEOUS at 22:41

## 2018-03-10 RX ADMIN — FLUCONAZOLE SCH MLS: 2 INJECTION, SOLUTION INTRAVENOUS at 20:25

## 2018-03-10 RX ADMIN — Medication SCH ML: at 21:41

## 2018-03-10 RX ADMIN — POTASSIUM CHLORIDE PRN MLS: 29.8 INJECTION, SOLUTION INTRAVENOUS at 09:23

## 2018-03-10 RX ADMIN — CLOTRIMAZOLE SCH APPLIC: 1 CREAM TOPICAL at 20:57

## 2018-03-10 NOTE — EKG
Test Reason : 

Blood Pressure : ***/*** mmHG

Vent. Rate : 067 BPM     Atrial Rate : 067 BPM

   P-R Int : 148 ms          QRS Dur : 084 ms

    QT Int : 382 ms       P-R-T Axes : 047 016 021 degrees

   QTc Int : 403 ms

 

Normal sinus rhythm with sinus arrhythmia

Normal ECG

 

Confirmed by DILAN COLORADO, LUKAS ([id number]),  ZUNILDA JOHNSON ([id number]) on 3/10/2018 3:56:13 PM

 

Referred By:  DILAN           Confirmed By:LUKAS KONG MD

## 2018-03-10 NOTE — PRG
DATE OF SERVICE:  03/10/2018

 

SUBJECTIVE:  Esthela Edwards doing well.  She is in IMCU.  Again nurses doing excellent job, getting he
r out of bed this morning.  She is due to get out of the bed this afternoon again.  The patient is as
dick to get out of bed.  She seems to be more cooperative.

 

OBJECTIVE:

VITAL SIGNS:  97.9, 84, 141/46.  NG tube output today is 1020 in the last 24 hours, decreased from 3.
3 liters the day before.  She has not had any bowel function.

LUNGS:  Clear to auscultation.

CARDIAC:  Regular rate and rhythm without murmur or gallop.

ABDOMEN:  Soft, quiet, no bowel sounds, nontender.  VAC wound in place.

 

LABORATORY:  This morning her white count is 10, hemoglobin 7.7.  Basic metabolic profile 138 sodium,
 3.3 potassium, carbon dioxide 33, creatinine 0.64, BUN 43.  Accu-Cheks 150-194.  Magnesium was down 
to 1.6, which is the lower limits of normal.

 

ASSESSMENT AND PLAN:

1.  Low magnesium and infuse magnesium.

2.  Low potassium and few potassium replacement.

3.  Enterocutaneous fistula, hopefully resolving.  Continue wound VAC to the wound, Sandostatin subcu
.  NG tube and TPN nutritional support.

4.  Deconditioning.  Continue out of bed and can be worked with therapy.

5.  DNR status.

6.  Postoperative bowel obstruction.  Continue nonoperative treatment.

## 2018-03-11 LAB
ANION GAP SERPL CALC-SCNC: 8 MMOL/L (ref 10–20)
BASOPHILS # BLD AUTO: 0.1 THOU/UL (ref 0–0.2)
BASOPHILS NFR BLD AUTO: 0.9 % (ref 0–1)
BUN SERPL-MCNC: 32 MG/DL (ref 9.8–20.1)
CALCIUM SERPL-MCNC: 8.5 MG/DL (ref 7.8–10.44)
CHLORIDE SERPL-SCNC: 100 MMOL/L (ref 98–107)
CO2 SERPL-SCNC: 34 MMOL/L (ref 23–31)
CREAT CL PREDICTED SERPL C-G-VRATE: 86 ML/MIN (ref 70–130)
EOSINOPHIL # BLD AUTO: 0.3 THOU/UL (ref 0–0.7)
EOSINOPHIL NFR BLD AUTO: 2.7 % (ref 0–10)
GLUCOSE SERPL-MCNC: 111 MG/DL (ref 83–110)
HGB BLD-MCNC: 7.7 G/DL (ref 12–16)
INR PPP: 1.1
LYMPHOCYTES # BLD: 2.8 THOU/UL (ref 1.2–3.4)
LYMPHOCYTES NFR BLD AUTO: 30.4 % (ref 21–51)
MAGNESIUM SERPL-MCNC: 1.6 MG/DL (ref 1.6–2.6)
MCH RBC QN AUTO: 30.7 PG (ref 27–31)
MCV RBC AUTO: 95.2 FL (ref 81–99)
MONOCYTES # BLD AUTO: 0.5 THOU/UL (ref 0.11–0.59)
MONOCYTES NFR BLD AUTO: 5.1 % (ref 0–10)
NEUTROPHILS # BLD AUTO: 5.6 THOU/UL (ref 1.4–6.5)
NEUTROPHILS NFR BLD AUTO: 61 % (ref 42–75)
PLATELET # BLD AUTO: 323 THOU/UL (ref 130–400)
POTASSIUM SERPL-SCNC: 3.7 MMOL/L (ref 3.5–5.1)
PROTHROMBIN TIME: 14.8 SEC (ref 12–14.7)
RBC # BLD AUTO: 2.5 MILL/UL (ref 4.2–5.4)
SODIUM SERPL-SCNC: 138 MMOL/L (ref 136–145)
WBC # BLD AUTO: 9.2 THOU/UL (ref 4.8–10.8)

## 2018-03-11 RX ADMIN — CLONIDINE SCH MG: 0.3 PATCH TRANSDERMAL at 16:33

## 2018-03-11 RX ADMIN — INSULIN LISPRO PRN UNIT: 100 INJECTION, SOLUTION INTRAVENOUS; SUBCUTANEOUS at 22:08

## 2018-03-11 RX ADMIN — Medication SCH ML: at 09:31

## 2018-03-11 RX ADMIN — CLOTRIMAZOLE SCH APPLIC: 1 CREAM TOPICAL at 20:42

## 2018-03-11 RX ADMIN — CLOTRIMAZOLE SCH APPLIC: 1 CREAM TOPICAL at 09:28

## 2018-03-11 RX ADMIN — FLUCONAZOLE SCH MLS: 2 INJECTION, SOLUTION INTRAVENOUS at 09:27

## 2018-03-11 RX ADMIN — FLUCONAZOLE SCH MLS: 2 INJECTION, SOLUTION INTRAVENOUS at 20:40

## 2018-03-11 RX ADMIN — DEXTROSE MONOHYDRATE SCH MLS: 70 INJECTION, SOLUTION INTRAVENOUS at 14:01

## 2018-03-11 RX ADMIN — Medication SCH ML: at 20:42

## 2018-03-11 NOTE — PRG
DATE OF SERVICE:  03/11/2018

 

Esthela Edwards is clinically unchanged.  Her vital signs remain stable.  Her lungs, heart, and abdomen
 are unchanged.

 

She is still on IV antihypertensives.

 

Started a Catapres patch today.  We will change her hydralazine to p.r.n. if systolic blood pressure 
greater than 180.  Hopefully, we can get her off IV medications.

## 2018-03-11 NOTE — PRG
DATE OF SERVICE:  03/11/2018

 

SUBJECTIVE:  Esthela Edwards is doing well today.  She has been up in a chair.  She is not having compl
aints.  She is taking a nap.

 

OBJECTIVE:

VITAL SIGNS:  98.9 degrees, 84, 150/53.  Gastric output for the past 24 hours, 1200 mL.  She has no f
latus or stool.

LUNGS:  Clear to auscultation.

CARDIAC:  Regular rate and rhythm without murmur, rub, or gallop.

ABDOMEN:  Soft, normal bowel sounds, nontender.  Wound VAC in place.

 

LABORATORY DATA:  This morning, white count 9, hemoglobin 7.7.  Sodium 138, potassium 3.7, BUN 32, cr
eatinine 0.61, magnesium 1.6, phosphorus 3.0.

 

ASSESSMENT:

1.  Overall, the patient is doing well.  Continue NG tube, Sandostatin, TPN for resolving her cutaneo
us fistula and ileus.  Normoactive bowel sounds audible.  Small bowel follow-through earlier in the w
[place].  No passage beyond mid small bowel after four hours with patient having vomiting during the stud
y.

2.  Deconditioning.  Continue mobility.

3.  Discussed with family and they are agreeable with nonoperative therapy.  Consider LTAC in the fut
ure.

## 2018-03-12 LAB
ANION GAP SERPL CALC-SCNC: 9 MMOL/L (ref 10–20)
BUN SERPL-MCNC: 27 MG/DL (ref 9.8–20.1)
CALCIUM SERPL-MCNC: 8.2 MG/DL (ref 7.8–10.44)
CHLORIDE SERPL-SCNC: 99 MMOL/L (ref 98–107)
CO2 SERPL-SCNC: 31 MMOL/L (ref 23–31)
CREAT CL PREDICTED SERPL C-G-VRATE: 85 ML/MIN (ref 70–130)
GLUCOSE SERPL-MCNC: 118 MG/DL (ref 83–110)
MAGNESIUM SERPL-MCNC: 1.2 MG/DL (ref 1.6–2.6)
POTASSIUM SERPL-SCNC: 3.9 MMOL/L (ref 3.5–5.1)
SODIUM SERPL-SCNC: 135 MMOL/L (ref 136–145)

## 2018-03-12 RX ADMIN — INSULIN LISPRO PRN UNIT: 100 INJECTION, SOLUTION INTRAVENOUS; SUBCUTANEOUS at 21:51

## 2018-03-12 RX ADMIN — FLUCONAZOLE SCH MLS: 2 INJECTION, SOLUTION INTRAVENOUS at 09:57

## 2018-03-12 RX ADMIN — CLOTRIMAZOLE SCH APPLIC: 1 CREAM TOPICAL at 20:17

## 2018-03-12 RX ADMIN — Medication SCH: at 09:59

## 2018-03-12 RX ADMIN — FLUCONAZOLE SCH MLS: 2 INJECTION, SOLUTION INTRAVENOUS at 20:17

## 2018-03-12 RX ADMIN — DEXTROSE MONOHYDRATE SCH MLS: 70 INJECTION, SOLUTION INTRAVENOUS at 14:03

## 2018-03-12 RX ADMIN — CLOTRIMAZOLE SCH APPLIC: 1 CREAM TOPICAL at 09:57

## 2018-03-12 RX ADMIN — Medication SCH ML: at 20:16

## 2018-03-12 NOTE — PRG
DATE OF SERVICE:  03/12/2018

 

This morning she is awake, alert, responsive.  Denies any pain or shortness of breath.

 

PHYSICAL EXAMINATION:

VITAL SIGNS:  Sats are 99% on 1 liter, temperature 99, respiration 18, pulse 98, blood pressure 150/6
9.

CHEST:  Chest revealed decreased breath sounds without any wheezing.

CARDIAC:  Normal S1.

ABDOMEN:  Soft.  No masses.

 

IMPRESSION:

1.  Status post lap for bowel obstruction and ischemic bowel.

2.  Chronic obstructive pulmonary disease.

3.  Lung disease.

4.  Severe deconditioning.

5.  Obesity.

6.  Dementia.

 

PLAN:  At this stage continue TPN, supportive care, eventually placement.  

 

I will follow.

## 2018-03-12 NOTE — PRG
DATE OF SERVICE:  03/12/2018

 

SUBJECTIVE:  Esthela Edwards is doing well today.  She is up in a chair this evening.

 

OBJECTIVE:

VITAL SIGNS:  Temperature, T-max 100 degrees, currently 99 degrees.  Blood pressure is 147/61.

LUNGS:  Clear to auscultation.

CARDIAC:  Regular rate and rhythm without murmur.

ABDOMEN:  Soft, no bowel sounds.  Gastric drainage is 950 mL over the past 24 hours.

 

LABORATORY DATA:  White count 9 and hemoglobin 7.7.  Sodium 135, potassium 3.9, BUN 27, creatinine 0.
61, and magnesium 1.2.

 

ASSESSMENT AND PLAN:

1.  Enterocutaneous fistula.  Continue NG tube, Sandostatin, TPN, await bowel function.

2.  Open wound.  View wound in the next dressing change on Thursday.

## 2018-03-13 LAB
ANION GAP SERPL CALC-SCNC: 10 MMOL/L (ref 10–20)
BUN SERPL-MCNC: 27 MG/DL (ref 9.8–20.1)
CALCIUM SERPL-MCNC: 8.3 MG/DL (ref 7.8–10.44)
CHLORIDE SERPL-SCNC: 100 MMOL/L (ref 98–107)
CO2 SERPL-SCNC: 30 MMOL/L (ref 23–31)
CREAT CL PREDICTED SERPL C-G-VRATE: 88 ML/MIN (ref 70–130)
GLUCOSE SERPL-MCNC: 78 MG/DL (ref 83–110)
MAGNESIUM SERPL-MCNC: 1.1 MG/DL (ref 1.6–2.6)
POTASSIUM SERPL-SCNC: 3.9 MMOL/L (ref 3.5–5.1)
SODIUM SERPL-SCNC: 136 MMOL/L (ref 136–145)

## 2018-03-13 RX ADMIN — Medication SCH: at 10:30

## 2018-03-13 RX ADMIN — CLOTRIMAZOLE SCH APPLIC: 1 CREAM TOPICAL at 10:30

## 2018-03-13 RX ADMIN — FLUCONAZOLE SCH MLS: 2 INJECTION, SOLUTION INTRAVENOUS at 08:49

## 2018-03-13 RX ADMIN — Medication PRN MLS: at 10:31

## 2018-03-13 RX ADMIN — CLOTRIMAZOLE SCH APPLIC: 1 CREAM TOPICAL at 20:51

## 2018-03-13 RX ADMIN — DEXTROSE MONOHYDRATE SCH MLS: 70 INJECTION, SOLUTION INTRAVENOUS at 14:37

## 2018-03-13 RX ADMIN — INSULIN LISPRO PRN UNIT: 100 INJECTION, SOLUTION INTRAVENOUS; SUBCUTANEOUS at 16:22

## 2018-03-13 RX ADMIN — FLUCONAZOLE SCH MLS: 2 INJECTION, SOLUTION INTRAVENOUS at 21:05

## 2018-03-13 RX ADMIN — Medication SCH ML: at 20:52

## 2018-03-13 NOTE — RAD
PORTABLE CHEST:

3/13/18

 

HISTORY: 

Fever. 

 

COMPARISON:  

3/6/18.

 

FINDINGS/IMPRESSION:  

Question new patchy infiltrate in the right lung base. Heart size is upper normal. Mild vascular engo
rgement is again noted. No other change from the 3/6/18 exam. 

 

POS: [facility]

## 2018-03-13 NOTE — PRG
DATE OF SERVICE:  03/13/2018

 

She denies any pain or discomfort.  NG tube in place.  Copious amounts of green drainage. 

 

PHYSICAL EXAMINATION: 

VITAL SIGNS:  Sats 90% on room air, respiration 22.  Blood pressure 122/74, temperature 100.2.

CHEST:  Chest revealed bilateral rhonchi and crackles.

CARDIAC:  Normal S1-S2.

ABDOMEN:  Soft, no masses.

 

IMPRESSION:  Status post lap.  Initially bowel, status post enterocutaneous fistula.  Open wound.

 

PLAN:  Antibiotics as per Surgery.  TPN, supportive care.  Pulmonary will follow.

## 2018-03-13 NOTE — PRG
DATE OF SERVICE:  03/13/2018

 

SUBJECTIVE:  Esthela Edwards is doing well today.

 

OBJECTIVE:

VITAL SIGNS:  100 degrees, 78, 22, 136/54.

LUNGS:  Clear to auscultation.

CARDIAC:  Regular rate and rhythm without murmur, rub, or gallop.

ABDOMEN:  Soft, nontender.  Wound VAC in place to be viewed Thursday.

EXTREMITIES:  Unremarkable.

 

LABORATORY DATA:  No CBC today.  Basic metabolic profile normal with potassium 3.9; magnesium 1.1, wh
ich was replaced.  Accu-Cheks 113-150. 

 

The patient is up in bed, into a chair.

 

ASSESSMENT AND PLAN:  The patient is doing well.  Her Moran catheter has been removed.  Mobility is g
ood.  Heart rate has been under control.  Plan to transfer to the surgery floor today.

## 2018-03-14 RX ADMIN — Medication SCH ML: at 20:41

## 2018-03-14 RX ADMIN — CLOTRIMAZOLE SCH APPLIC: 1 CREAM TOPICAL at 20:52

## 2018-03-14 RX ADMIN — INSULIN LISPRO PRN UNIT: 100 INJECTION, SOLUTION INTRAVENOUS; SUBCUTANEOUS at 17:57

## 2018-03-14 RX ADMIN — CLOTRIMAZOLE SCH APPLIC: 1 CREAM TOPICAL at 08:39

## 2018-03-14 RX ADMIN — FLUCONAZOLE SCH MLS: 2 INJECTION, SOLUTION INTRAVENOUS at 20:39

## 2018-03-14 RX ADMIN — BENZOCAINE PRN SPRAY: 200 SPRAY DENTAL; ORAL; PERIODONTAL at 06:58

## 2018-03-14 RX ADMIN — FLUCONAZOLE SCH MLS: 2 INJECTION, SOLUTION INTRAVENOUS at 08:39

## 2018-03-14 RX ADMIN — DEXTROSE MONOHYDRATE SCH MLS: 70 INJECTION, SOLUTION INTRAVENOUS at 13:54

## 2018-03-14 RX ADMIN — Medication SCH: at 08:41

## 2018-03-14 NOTE — PRG
DATE OF SERVICE:  03/14/2018

 

This morning NG tube in place, TPN on board.  Denying any pain or discomfort.  Denied any shortness o
f breath.

 

X-ray taken yesterday shows no acute infiltrates.  Magnesium 1.4.

 

PHYSICAL EXAMINATION: 

VITAL SIGNS:  Sats 90% on room air, respiration 20, temperature 97, blood pressure is 120/66.

CHEST:  Chest reveals decreased breath sounds, no wheezing.

CARDIAC:  Normal S1, S2. 

ABDOMEN:  Soft, no masses.

 

IMPRESSION:

1.  Chronic obstructive pulmonary disease, ILD stable.

2.  Status post lap for small bowel ischemia, peritonitis.

 

PLAN:  Continue TPN, supportive care, PT.  

 

I will follow.

## 2018-03-14 NOTE — PRG
DATE OF SERVICE:  03/14/2018

 

SUBJECTIVE:  Esthela Edwards is an 89-year-old female now transferred from [facility] to the surgical floor. 
 The patient has NG tube in place.  Chloraseptic spray has been ordered for p.r.n. use.

 

PHYSICAL EXAMINATION:

VITAL SIGNS:  Temperature 99.2 degrees, 74, 94% room air, 120/66.  Gastric output in the last 24 hour
s not recorded.

LUNGS:  Clear to auscultation.

CARDIAC:  Regular rate and rhythm without murmur or gallop.

ABDOMEN:  Soft, no bowel sounds.  VAC in place will be changed tomorrow (Mondays and Thursdays).

EXTREMITIES:  Unremarkable.

 

LABORATORY DATA:  Labs ordered for the morning, this morning magnesium is 1.4.  She received a dose o
f magnesium.  We will give her another dose that she has been chronically low.

 

ASSESSMENT AND PLAN:

1.  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  Continu
e treatment.  There is minimal enteric drainage.  We will view her wound next week and could consider
 stopping the Sandostatin at that time, but need to continue total parenteral nutrition as there has 
been no bowel function.  Previous Gastrografin swallow several days ago did not reveal transit into t
he colon and she had 3.5 liters out of her nasogastric tube today.  A 24 hours after this study, woul
d not repeat that for now, continue supportive care.  Consider, LTAC possibly in next week, pending c
linical course.  Continue physical therapy mobility for deconditioning.

2.  Low magnesium, replaced.  Dr. Alex is covering over the next few days.

## 2018-03-15 LAB
ANION GAP SERPL CALC-SCNC: 10 MMOL/L (ref 10–20)
BUN SERPL-MCNC: 27 MG/DL (ref 9.8–20.1)
CALCIUM SERPL-MCNC: 8.3 MG/DL (ref 7.8–10.44)
CHLORIDE SERPL-SCNC: 99 MMOL/L (ref 98–107)
CO2 SERPL-SCNC: 28 MMOL/L (ref 23–31)
CREAT CL PREDICTED SERPL C-G-VRATE: 90 ML/MIN (ref 70–130)
GLUCOSE SERPL-MCNC: 139 MG/DL (ref 83–110)
HGB BLD-MCNC: 7.4 G/DL (ref 12–16)
MAGNESIUM SERPL-MCNC: 1.9 MG/DL (ref 1.6–2.6)
MCH RBC QN AUTO: 31.8 PG (ref 27–31)
MCV RBC AUTO: 97.1 FL (ref 81–99)
MDIFF COMPLETE?: YES
PLATELET # BLD AUTO: 372 THOU/UL (ref 130–400)
POLYCHROMASIA BLD QL SMEAR: (no result) (100X)
POTASSIUM SERPL-SCNC: 3.8 MMOL/L (ref 3.5–5.1)
RBC # BLD AUTO: 2.34 MILL/UL (ref 4.2–5.4)
SODIUM SERPL-SCNC: 133 MMOL/L (ref 136–145)
STOMATOCYTES BLD QL SMEAR: (no result) (100X)
WBC # BLD AUTO: 12 THOU/UL (ref 4.8–10.8)

## 2018-03-15 RX ADMIN — FLUCONAZOLE SCH MLS: 2 INJECTION, SOLUTION INTRAVENOUS at 20:39

## 2018-03-15 RX ADMIN — CLOTRIMAZOLE SCH: 1 CREAM TOPICAL at 21:17

## 2018-03-15 RX ADMIN — FLUCONAZOLE SCH MLS: 2 INJECTION, SOLUTION INTRAVENOUS at 08:49

## 2018-03-15 RX ADMIN — Medication SCH: at 21:17

## 2018-03-15 RX ADMIN — BENZOCAINE PRN SPRAY: 200 SPRAY DENTAL; ORAL; PERIODONTAL at 20:39

## 2018-03-15 RX ADMIN — INSULIN LISPRO PRN UNIT: 100 INJECTION, SOLUTION INTRAVENOUS; SUBCUTANEOUS at 12:23

## 2018-03-15 RX ADMIN — CLOTRIMAZOLE SCH APPLIC: 1 CREAM TOPICAL at 08:51

## 2018-03-15 RX ADMIN — DEXTROSE MONOHYDRATE SCH MLS: 70 INJECTION, SOLUTION INTRAVENOUS at 14:28

## 2018-03-15 RX ADMIN — Medication SCH: at 09:10

## 2018-03-15 NOTE — PRG
DATE OF SERVICE:  03/15/2018

 

This morning is better.  In no distress.  NG tube in place.  

 

PHYSICAL EXAMINATION: 

VITAL SIGNS:   Sats 90% on room air, respiration 20, temperature 99, blood pressure 146/72.

CHEST:  Chest reveals decreased breath sounds, no wheezing.

CARDIAC:  Normal S1, S2.

 

LABORATORY:  White count 12,000, H&H is 7 and 22.

 

IMPRESSION:  

1.  Interstitial lung disease.  

2.  Chronic obstructive pulmonary disease.

3.  Lap peritonitis.

 

PLAN:  TPN, supportive care.

 

I will follow.

## 2018-03-15 NOTE — PRG
DATE OF SERVICE:  03/15/2018

 

SUBJECTIVE:  Ms. Edwards is postoperative day #29 from an exploratory laparotomy for small-bowel obstru
ction.  She subsequently developed an anastomotic leak resulting in an enterocutaneous fistula.  This
 has been treated with wound care and a wound VAC.  She has nasogastric tube, antibiotics, TPN.  Toda
y, she has no new complaints.

 

OBJECTIVE:

VITAL SIGNS:  She is afebrile.  Vital signs within normal limits.

LUNGS:  Clear to auscultation.

HEART:  Regular rate and rhythm.

ABDOMEN:  Has a wound VAC in place.  Bowel sounds are scant if any.

 

LABORATORY DATA:  Her white blood cell count has gone up from 9.2 four days ago up to 12.0 today.  He
r hemoglobin remains low at 7.4.  Her differential is unremarkable.  Chemistries revealed minor elect
rolyte abnormalities.

 

ASSESSMENT AND PLAN:  She appears to be stable with her chronic abdominal problems secondary to her r
ecent surgery.  She will continue on TPN and Sandostatin.  She is currently getting Diflucan that danyel
ears to be her only antibiotic that she is on.  Continue with current treatment with plan to reassess
 her abdominal wound this upcoming week.

## 2018-03-16 RX ADMIN — FLUCONAZOLE SCH MLS: 2 INJECTION, SOLUTION INTRAVENOUS at 21:29

## 2018-03-16 RX ADMIN — CLOTRIMAZOLE SCH APPLIC: 1 CREAM TOPICAL at 21:29

## 2018-03-16 RX ADMIN — Medication PRN LOZ: at 18:00

## 2018-03-16 RX ADMIN — FLUCONAZOLE SCH MLS: 2 INJECTION, SOLUTION INTRAVENOUS at 09:31

## 2018-03-16 RX ADMIN — CLOTRIMAZOLE SCH APPLIC: 1 CREAM TOPICAL at 09:33

## 2018-03-16 RX ADMIN — DEXTROSE MONOHYDRATE SCH MLS: 70 INJECTION, SOLUTION INTRAVENOUS at 14:58

## 2018-03-16 RX ADMIN — Medication SCH: at 09:32

## 2018-03-16 NOTE — PRG
DATE OF SERVICE:  03/16/2018

 

SUBJECTIVE:  Ms. Edwards is postoperative day #30 from exploratory laparotomy for small-bowel obstructi
on with subsequent anastomotic leak resulting in enterocutaneous fistula.  She continues treatment wi
th nasogastric tube, wound VAC and TPN.

 

She has no new complaints, but voices frustration and wants a discussion of her options regarding fur
ther care.  She denies any significant pain, but has irritation with her nasogastric tube.

 

PHYSICAL EXAMINATION:

VITAL SIGNS:  She is afebrile.  Vital signs within normal limits.

LUNGS:  Clear to auscultation.

ABDOMEN:  Soft with VAC in place.  She appears to have some very hypoactive bowel sounds.  There is n
o focal tenderness to palpation.

 

LABORATORY DATA:  There are no new labs today.

 

ASSESSMENT AND PLAN:  Patient with an enterocutaneous fistula following surgery for a small-bowel obs
truction.  She continues with conservative management.  Continue with same care including Sandostatin
 until Dr. Car returns to visit with her and her family on Monday.

## 2018-03-17 LAB
ANION GAP SERPL CALC-SCNC: 12 MMOL/L (ref 10–20)
ANISOCYTOSIS BLD QL SMEAR: (no result) (100X)
BUN SERPL-MCNC: 21 MG/DL (ref 9.8–20.1)
CALCIUM SERPL-MCNC: 8.4 MG/DL (ref 7.8–10.44)
CHLORIDE SERPL-SCNC: 102 MMOL/L (ref 98–107)
CO2 SERPL-SCNC: 27 MMOL/L (ref 23–31)
CREAT CL PREDICTED SERPL C-G-VRATE: 93 ML/MIN (ref 70–130)
GLUCOSE SERPL-MCNC: 120 MG/DL (ref 83–110)
HGB BLD-MCNC: 7.4 G/DL (ref 12–16)
MAGNESIUM SERPL-MCNC: 1.3 MG/DL (ref 1.6–2.6)
MCH RBC QN AUTO: 30.5 PG (ref 27–31)
MCV RBC AUTO: 97.4 FL (ref 81–99)
MDIFF COMPLETE?: YES
PLATELET # BLD AUTO: 367 THOU/UL (ref 130–400)
POLYCHROMASIA BLD QL SMEAR: (no result) (100X)
POTASSIUM SERPL-SCNC: 3.7 MMOL/L (ref 3.5–5.1)
RBC # BLD AUTO: 2.43 MILL/UL (ref 4.2–5.4)
SODIUM SERPL-SCNC: 137 MMOL/L (ref 136–145)
WBC # BLD AUTO: 12.5 THOU/UL (ref 4.8–10.8)

## 2018-03-17 RX ADMIN — CLOTRIMAZOLE SCH APPLIC: 1 CREAM TOPICAL at 09:02

## 2018-03-17 RX ADMIN — INSULIN LISPRO PRN UNIT: 100 INJECTION, SOLUTION INTRAVENOUS; SUBCUTANEOUS at 12:05

## 2018-03-17 RX ADMIN — FLUCONAZOLE SCH MLS: 2 INJECTION, SOLUTION INTRAVENOUS at 21:09

## 2018-03-17 RX ADMIN — CLOTRIMAZOLE SCH APPLIC: 1 CREAM TOPICAL at 21:39

## 2018-03-17 RX ADMIN — DEXTROSE MONOHYDRATE SCH MLS: 70 INJECTION, SOLUTION INTRAVENOUS at 14:28

## 2018-03-17 RX ADMIN — Medication SCH ML: at 21:10

## 2018-03-17 RX ADMIN — Medication SCH: at 03:34

## 2018-03-17 RX ADMIN — Medication SCH: at 11:42

## 2018-03-17 RX ADMIN — FLUCONAZOLE SCH MLS: 2 INJECTION, SOLUTION INTRAVENOUS at 09:02

## 2018-03-17 NOTE — PRG
DATE OF SERVICE:  03/17/2018

 

SUBJECTIVE:  Mrs. Edwards is postoperative day 31 from exploratory laparotomy for small-bowel obstructi
on with subsequent anastomotic leak, resulting in enterocutaneous fistula.  She has an NG tube and wo
und VAC with TPN.  This evening, she vocalized no complaints upon my evaluation.

 

OBJECTIVE:

VITAL SIGNS:  Reviewed.  Patient had a low-grade temperature yesterday evening and early this morning
.  I suspect this is secondary to atelectasis, as her current oxygen saturation is 94% on room air.

LUNGS:  Pulmonology is following her, as she has a history of COPD and interstitial lung disease.  Br
eathing is nonlabored.  NG tube is in place.

ABDOMEN:  Soft, nontender, nondistended.  Wound VAC is in place.

EXTREMITIES:  Moves all extremities x4.

NEUROLOGIC:  No focal deficit noted.

 

LABORATORY FINDINGS:  WBC 12.5, hemoglobin 7.4, hematocrit 23.7, platelet count 367.  Sodium 137, pot
assium 3.7, chloride 102, carbon dioxide 27, BUN 21, creatinine 0.56, glucose 120, magnesium 1.3, NG 
tube output 875 mL for 24 hours.

 

ASSESSMENT AND PLAN:  This is a patient with an enterocutaneous fistula, status post exploratory lapa
rotomy for small-bowel obstruction.

 

PLAN:  Continue supportive care and conservative management as ordered.  Continue wound care as order
ed.  Dr. Car plans to visit with her and the family on Monday regarding additional plans.  Patient
 was discussed with Dr. Pinzon.

## 2018-03-18 RX ADMIN — DEXTROSE MONOHYDRATE SCH MLS: 70 INJECTION, SOLUTION INTRAVENOUS at 14:36

## 2018-03-18 RX ADMIN — Medication SCH ML: at 20:18

## 2018-03-18 RX ADMIN — CLOTRIMAZOLE SCH APPLIC: 1 CREAM TOPICAL at 09:00

## 2018-03-18 RX ADMIN — Medication SCH: at 08:36

## 2018-03-18 RX ADMIN — CLONIDINE SCH MG: 0.3 PATCH TRANSDERMAL at 14:37

## 2018-03-18 RX ADMIN — CLOTRIMAZOLE SCH APPLIC: 1 CREAM TOPICAL at 20:18

## 2018-03-18 NOTE — PRG
DATE OF SERVICE:  03/18/2018

 

SUBJECTIVE:  Ms. Edwards is postoperative day 32 from exploratory laparotomy for 
small-bowel obstruction with subsequent anastomotic leak resulting in 
enterocutaneous fistula.  She has a NG tube and wound VAC with nutrition via 
TPN.  She has remained stable on the surgical floor with pain well controlled.

 

OBJECTIVE:

VITAL SIGNS:  Temperature 98.8, pulse 79, blood pressure 181/74, respirations 18
, O2 sat 97%.

CONSTITUTIONAL:  Elderly female sitting up in chair in no acute distress, 
expresses no pain.

PULMONARY:  Respirations even, unlabored.  No respiratory distress.

ABDOMEN:  Soft, nontender, nondistended.  Wound VAC in place to midline abdomen
, functioning normally.

EXTREMITIES:  Moves all extremities.  Cap refill brisk.

NEUROLOGIC:  GCS 15.  Alert and oriented x3.

 

ASSESSMENT:

1.  Status post exploratory laparotomy.

2.  Enterocutaneous fistula.

3.  Negative pressure wound therapy, VAC in place, functioning normally.  Wound 
care ostomy nurse following.

 

PLAN:

1.  Continue antihypertensives as ordered.

2.  Continue wound VAC therapy.

3.  Will continue Sandostatin 100 mcg q.8h.

4.  Dr. Car to see the patient tomorrow and discuss with the patient and 
family additional recommendation and plan.

 

The patient was seen and examined with Dr. Pinzon who agrees with the plan.

 

LAURENCE

## 2018-03-19 RX ADMIN — CLOTRIMAZOLE SCH APPLIC: 1 CREAM TOPICAL at 09:01

## 2018-03-19 RX ADMIN — Medication SCH: at 09:04

## 2018-03-19 RX ADMIN — Medication PRN LOZ: at 16:23

## 2018-03-19 RX ADMIN — Medication SCH: at 21:26

## 2018-03-19 RX ADMIN — CLOTRIMAZOLE SCH APPLIC: 1 CREAM TOPICAL at 21:27

## 2018-03-19 RX ADMIN — DEXTROSE MONOHYDRATE SCH MLS: 70 INJECTION, SOLUTION INTRAVENOUS at 14:37

## 2018-03-19 NOTE — RAD
ONE VIEW CHEST

TWO VIEWS ABDOMEN:

 

HISTORY: 

Followup postop fistula/ileus.

 

COMPARISON: 

3/6/18.

 

FINDINGS: 

 

PORTABLE SINGLE VIEW CHEST:

There is evidence of right-sided PICC line with the distal tip projecting over the superior vena cava
.  There is atherosclerosis of the aorta.  Nasogastric tube extends beyond the diaphragm.  Normal car
diac silhouette.  The pulmonary vessels are slightly prominent.  Patchy interstitial opacities.  No m
asses or consolidation.  No pneumothorax.  There is diffuse bone demineralization.

 

ABDOMEN 2 VIEWS:

Nasogastric tube terminates in the epigastric region.  No evidence of bowel distention or dilatation.
  There is fecal material in the right hemicolon.  No pneumoperitoneum.  No differential air fluid le
vels.  Skin staples and surgical clips are noted.  Nonspecific nondistended air-fluid loops of small 
bowel in the left hemiabdomen.

 

IMPRESSION: 

1.  Nonspecific bowel gas pattern.

 

2.  No acute cardiopulmonary process.

 

POS: [facility]

## 2018-03-19 NOTE — PRG
DATE OF SERVICE:  03/19/2018

 

SUBJECTIVE:  Esthela Edwards is doing well today.  She is on the surgical floor and has done well ov
er the weekend.  Today is Monday.

 

PHYSICAL EXAMINATION:

VITAL SIGNS:  Temperature 99.2 degrees, 82, 150/63.

LUNGS:  Clear to auscultation.

CARDIAC:  Regular rate and rhythm without murmur or gallop.

ABDOMEN:  Soft, bowel sounds active.  Wound granulating superiorly and near closed inferiorly.  The w
ound is granulating.  Once the wound VAC is removed, there is a foul odor, but there is no enteric co
ntents appreciated.  In the wound VAC, there is minimal drainage.  Depths of the wound revealed visib
le fascia and there is a defect, but no herniation.

 

LABORATORY DATA:  Last laboratory data were two days ago and were more normal.  Hemoglobin stable at 
7.4, magnesium yesterday 1.3.

 

ASSESSMENT AND PLAN:

1.  Enterocutaneous fistula appears to have resolved.  There is still no evidence of bowel function. 
 She has not passed flatus or stool.  We will obtain abdominal x-rays today, plain films.  Continue N
G tube suction.  NG tube suction last 24 hours has only been 300 mL and she continues to tolerate NG 
tube presence well.  In the last two days, she has had 525 mL out.  NG tube 24 hours past and prior t
o that 500 mL.

2.  Low magnesium.  Continue to replace.  Recheck labs in the morning.

3.  Deconditioning.  The patient is much better.  She has walked 85 feet today with physical therapy 
and seems to have turned the corner and gained great amount of strength.

## 2018-03-20 LAB
ALBUMIN SERPL BCG-MCNC: 2.1 G/DL (ref 3.4–4.8)
ALP SERPL-CCNC: 254 U/L (ref 40–150)
ALT SERPL W P-5'-P-CCNC: 33 U/L (ref 8–55)
ANION GAP SERPL CALC-SCNC: 7 MMOL/L (ref 10–20)
AST SERPL-CCNC: 52 U/L (ref 5–34)
BASOPHILS # BLD AUTO: 0.1 THOU/UL (ref 0–0.2)
BASOPHILS NFR BLD AUTO: 0.5 % (ref 0–1)
BILIRUB SERPL-MCNC: 0.3 MG/DL (ref 0.2–1.2)
BUN SERPL-MCNC: 21 MG/DL (ref 9.8–20.1)
CALCIUM SERPL-MCNC: 8.4 MG/DL (ref 7.8–10.44)
CHLORIDE SERPL-SCNC: 99 MMOL/L (ref 98–107)
CO2 SERPL-SCNC: 30 MMOL/L (ref 23–31)
CREAT CL PREDICTED SERPL C-G-VRATE: 91 ML/MIN (ref 70–130)
EOSINOPHIL # BLD AUTO: 0 THOU/UL (ref 0–0.7)
EOSINOPHIL NFR BLD AUTO: 0.4 % (ref 0–10)
GLOBULIN SER CALC-MCNC: 5.2 G/DL (ref 2.4–3.5)
GLUCOSE SERPL-MCNC: 89 MG/DL (ref 83–110)
HGB BLD-MCNC: 7.5 G/DL (ref 12–16)
LYMPHOCYTES # BLD: 5.1 THOU/UL (ref 1.2–3.4)
LYMPHOCYTES NFR BLD AUTO: 40.6 % (ref 21–51)
MAGNESIUM SERPL-MCNC: 1.4 MG/DL (ref 1.6–2.6)
MCH RBC QN AUTO: 30.7 PG (ref 27–31)
MCV RBC AUTO: 97.4 FL (ref 81–99)
MONOCYTES # BLD AUTO: 1 THOU/UL (ref 0.11–0.59)
MONOCYTES NFR BLD AUTO: 7.7 % (ref 0–10)
NEUTROPHILS # BLD AUTO: 6.4 THOU/UL (ref 1.4–6.5)
NEUTROPHILS NFR BLD AUTO: 50.8 % (ref 42–75)
PLATELET # BLD AUTO: 452 THOU/UL (ref 130–400)
POTASSIUM SERPL-SCNC: 3.4 MMOL/L (ref 3.5–5.1)
RBC # BLD AUTO: 2.45 MILL/UL (ref 4.2–5.4)
SODIUM SERPL-SCNC: 133 MMOL/L (ref 136–145)
WBC # BLD AUTO: 12.6 THOU/UL (ref 4.8–10.8)

## 2018-03-20 RX ADMIN — DEXTROSE MONOHYDRATE SCH MLS: 70 INJECTION, SOLUTION INTRAVENOUS at 14:36

## 2018-03-20 RX ADMIN — CLOTRIMAZOLE SCH APPLIC: 1 CREAM TOPICAL at 08:30

## 2018-03-20 RX ADMIN — Medication SCH: at 08:54

## 2018-03-20 RX ADMIN — Medication SCH: at 22:05

## 2018-03-20 RX ADMIN — CLOTRIMAZOLE SCH APPLIC: 1 CREAM TOPICAL at 22:05

## 2018-03-20 NOTE — RAD
LUMBAR SPINE TWO VIEW:

3/20/18

 

HISTORY: 

Back pain. 

 

COMPARISON:  

Radiograph from 2006.

 

FINDINGS:  

The exam is limited as the exam to be performed on cross-table lateral views. There is no acute displ
aced fracture or malalignment of the lumbar spine. Enteric tube tip is at the gastric antrum.

 

There are surgical clips projecting over the pelvis. No dilated air fluid loops of the large or small
 bowel. 

 

There is anterolisthesis of L4 over L5 of approximately 7 mm. 

 

Extensive vascular calcification of the aorta. 

 

IMPRESSION:  

Grade 1 L4 over L5 anterolisthesis likely sequela of chronic facet arthropathy. No acute fracture or 
malalignment is appreciated. 

 

POS: [facility]

## 2018-03-20 NOTE — EKG
Test Reason : 

Blood Pressure : ***/*** mmHG

Vent. Rate : 083 BPM     Atrial Rate : 083 BPM

   P-R Int : 154 ms          QRS Dur : 082 ms

    QT Int : 376 ms       P-R-T Axes : 036 012 035 degrees

   QTc Int : 441 ms

 

Normal sinus rhythm

Normal ECG

When compared with ECG of 13-FEB-2018 19:52,

No significant change was found

Confirmed by TRISTA GARLAND ([id number]) on 3/20/2018 8:09:13 PM

 

Referred By:  BETH           Confirmed By:TRISTA GARLAND

## 2018-03-20 NOTE — PRG
DATE OF SERVICE:  03/20/2018

 

SUBJECTIVE:  Esthela Edwards is doing well today.  NG tube output 500 mL.

 

OBJECTIVE:

LUNGS:  Clear to auscultation.

CARDIAC:  Regular rate and rhythm without murmur or gallop.

ABDOMEN:  Soft.  Bowel sounds present, no bowel movements.  No flatus today.

 

LABORATORY DATA:  Her hemoglobin is 7.5, white count 12.6.  Basic metabolic profile:  Sodium 133, pot
assium 3.4, creatinine 0.57, BUN 21, magnesium is still low at 1.4, phosphorus 2.1.

 

ASSESSMENT AND PLAN:  Prolonged ileus versus bowel obstruction.  Yesterday's x-rays reveal gas in the
 colon, some stool in the right colon.  NG tube output still remains 300-500 mL per 24 hours.  I have
 talked to the family and we have explored options of LTAC versus hospice versus continued care.  The
y will discuss matters.  If we are to do inpatient, she would need a PEG tube for gravity drainage to
 prevent problems.  Tonight, we will clamp her NG tube to see how she fares overnight with this clamp
ed.  We will make further recommendations on his progress.  Consideration for repeating small bowel f
ollow-through could be given.  Magnesium replacement, potassium replacement, phosphorus replacement.

## 2018-03-21 RX ADMIN — Medication SCH: at 22:57

## 2018-03-21 RX ADMIN — Medication SCH: at 09:05

## 2018-03-21 RX ADMIN — CLOTRIMAZOLE SCH: 1 CREAM TOPICAL at 22:57

## 2018-03-21 RX ADMIN — DEXTROSE MONOHYDRATE SCH MLS: 70 INJECTION, SOLUTION INTRAVENOUS at 14:04

## 2018-03-21 RX ADMIN — CLOTRIMAZOLE SCH APPLIC: 1 CREAM TOPICAL at 08:49

## 2018-03-21 NOTE — RAD
SMALL BOWEL SERIES:

3/21/18

 

HISTORY: 

89-year-old female with abdominal pain and diagnosis of "small bowel obstruction."

 

TECHNIQUE:  

 view obtained at 9:57 a.m. Gastrografin injected through NG tube. Serial images obtained immedi
ately, and at 1.5 hours, 2.5 hours, 6.5 hours, and 9.5 hours. 

 

FINDINGS:  

On the  view, there is a large amount of stool in the right hemicolon. The bowel gas pattern is 
nonobstructive, with gas in nondilated transverse colon and in hepatic and splenic flexures, but no e
vidence of air filled dilated small bowel loops. The contrast distends the stomach and travels throug
h multiple small bowel loops. The jejunal loops are at least mildly dilated. Contrast then reaches th
e right hemicolon, mixing with the large amount of feces in that location, on the 9.5 hour image stud
y. As of that last image, the contrast has not reached the transverse or descending colon. 

 

IMPRESSION:  

1.      No evidence of high grade small bowel obstruction. 

2.      Constipation

 

POS: [facility]

## 2018-03-21 NOTE — PRG
DATE OF SERVICE:  03/21/2018

 

SUBJECTIVE:  Esthela Edwards is doing well today.

 

OBJECTIVE:

VITAL SIGNS:  100.1 degrees T-max, 99.5 degrees, heart rate 85, respiration rate 18, blood pressure 1
05/84.

LUNGS:  Clear to auscultation.

CARDIAC:  Regular rate and rhythm without murmur.

ABDOMEN:  Soft, nondistended, nontender.  Occasional bowel sounds heard.

 

LABORATORY DATA:  Not performed this morning.  Accu-Cheks 140-150.  Magnesium parenteral given today 
for chronically low magnesium.  Recheck tomorrow.  NG tube residual after 12-hours clamp 100 mL.  No 
nausea or vomiting.  Small bowel follow-through ordered and contrast progressing over the 2-1/2 hour 
film and she is tolerating this NG tube clamp with oral contrast well.  This is contrary to 2 weeks a
go when she had nausea, vomiting, and no progression of contrast.

 

ASSESSMENT AND PLAN:

1.  Resolving ileus, possible postoperative bowel obstruction.  Hopefully, resolving, some flatus las
t night and has a smear of stool when she changed her diaper yesterday.  Continue small bowel follow-
through, await results.

2.  Low magnesium.  Recheck tomorrow.

3.  Malnutrition.  Continue TPN and NG tube.  Hopefully, can remove the NG tube later today or tomorr
ow.

## 2018-03-21 NOTE — ADD-PRG
DATE OF SERVICE:  03/20/2018

 

ADDENDUM

 

SUBJECTIVE:  Ms. Edwards fell out of bed last night.  She has chronic low back pain and is on a fentany
l patch for that.  She complains of increased low back pain since sliding out of bed last night.  She
 did not lose consciousness.  There is no radiation of the pain.  There is no radiculopathy.  She is 
symmetrically strong and neurologically intact.  We will obtain a 3-view lumbar x-rays as a baseline 
and see how she fares overnight.

## 2018-03-22 LAB
ALBUMIN SERPL BCG-MCNC: 2.1 G/DL (ref 3.4–4.8)
ALP SERPL-CCNC: 208 U/L (ref 40–150)
ALT SERPL W P-5'-P-CCNC: 25 U/L (ref 8–55)
ANION GAP SERPL CALC-SCNC: 10 MMOL/L (ref 10–20)
AST SERPL-CCNC: 36 U/L (ref 5–34)
BASOPHILS # BLD AUTO: 0 THOU/UL (ref 0–0.2)
BASOPHILS NFR BLD AUTO: 0.1 % (ref 0–1)
BILIRUB SERPL-MCNC: 0.3 MG/DL (ref 0.2–1.2)
BUN SERPL-MCNC: 28 MG/DL (ref 9.8–20.1)
CALCIUM SERPL-MCNC: 8.7 MG/DL (ref 7.8–10.44)
CHLORIDE SERPL-SCNC: 100 MMOL/L (ref 98–107)
CO2 SERPL-SCNC: 33 MMOL/L (ref 23–31)
CREAT CL PREDICTED SERPL C-G-VRATE: 82 ML/MIN (ref 70–130)
EOSINOPHIL # BLD AUTO: 0.1 THOU/UL (ref 0–0.7)
EOSINOPHIL NFR BLD AUTO: 0.8 % (ref 0–10)
GLOBULIN SER CALC-MCNC: 5.5 G/DL (ref 2.4–3.5)
GLUCOSE SERPL-MCNC: 108 MG/DL (ref 83–110)
HGB BLD-MCNC: 7.1 G/DL (ref 12–16)
LYMPHOCYTES # BLD: 4.9 THOU/UL (ref 1.2–3.4)
LYMPHOCYTES NFR BLD AUTO: 37.2 % (ref 21–51)
MAGNESIUM SERPL-MCNC: 1.9 MG/DL (ref 1.6–2.6)
MCH RBC QN AUTO: 30 PG (ref 27–31)
MCV RBC AUTO: 95.5 FL (ref 81–99)
MONOCYTES # BLD AUTO: 1.4 THOU/UL (ref 0.11–0.59)
MONOCYTES NFR BLD AUTO: 10.3 % (ref 0–10)
NEUTROPHILS # BLD AUTO: 6.9 THOU/UL (ref 1.4–6.5)
NEUTROPHILS NFR BLD AUTO: 51.7 % (ref 42–75)
PLATELET # BLD AUTO: 483 THOU/UL (ref 130–400)
POTASSIUM SERPL-SCNC: 3.6 MMOL/L (ref 3.5–5.1)
RBC # BLD AUTO: 2.36 MILL/UL (ref 4.2–5.4)
SODIUM SERPL-SCNC: 139 MMOL/L (ref 136–145)
WBC # BLD AUTO: 13.3 THOU/UL (ref 4.8–10.8)

## 2018-03-22 RX ADMIN — CLOTRIMAZOLE SCH: 1 CREAM TOPICAL at 10:51

## 2018-03-22 RX ADMIN — DEXTROSE MONOHYDRATE SCH MLS: 70 INJECTION, SOLUTION INTRAVENOUS at 15:17

## 2018-03-22 RX ADMIN — Medication SCH: at 23:02

## 2018-03-22 RX ADMIN — Medication SCH: at 10:52

## 2018-03-22 RX ADMIN — CLOTRIMAZOLE SCH: 1 CREAM TOPICAL at 23:02

## 2018-03-22 RX ADMIN — INSULIN LISPRO PRN UNIT: 100 INJECTION, SOLUTION INTRAVENOUS; SUBCUTANEOUS at 01:03

## 2018-03-22 NOTE — RAD
ABDOMEN 1 VIEW:

 

Date:  03/22/18 

 

HISTORY:  

Follow-up small bowel follow-through. 

 

COMPARISON:  

Small bowel follow-through yesterday. 

 

FINDINGS:

There is some contrast throughout the ascending and transverse colon, as well as the distal small bow
el. Surgical clips are present in the abdomen. There is an air-filled distended loop of sigmoid colon
. 

 

IMPRESSION: 

Ingested contrast now within the large bowel. No evidence of small bowel obstruction. 

 

 

POS: OFF

## 2018-03-23 PROCEDURE — 0DH63UZ INSERTION OF FEEDING DEVICE INTO STOMACH, PERCUTANEOUS APPROACH: ICD-10-PCS | Performed by: SURGERY

## 2018-03-23 RX ADMIN — Medication SCH: at 08:14

## 2018-03-23 RX ADMIN — Medication SCH ML: at 21:31

## 2018-03-23 RX ADMIN — INSULIN LISPRO PRN UNIT: 100 INJECTION, SOLUTION INTRAVENOUS; SUBCUTANEOUS at 18:20

## 2018-03-23 RX ADMIN — CLOTRIMAZOLE SCH APPLIC: 1 CREAM TOPICAL at 08:35

## 2018-03-23 RX ADMIN — DEXTROSE MONOHYDRATE SCH MLS: 70 INJECTION, SOLUTION INTRAVENOUS at 14:08

## 2018-03-23 RX ADMIN — CLOTRIMAZOLE SCH APPLIC: 1 CREAM TOPICAL at 21:30

## 2018-03-23 NOTE — PRG
DATE OF SERVICE:  03/23/2018

 

SUBJECTIVE:  Ms. Edwards is doing well today.  She had a PEG tube today palliative to get rid of her NG
 tube.  She does have contrast in her colon from recent small bowel follow through.  Although she did
 not tolerate the small bowel follow through.  She had copious vomiting around the tube and had to pl
ace back to suction.  Delayed films reveal contrast in the colon, contrast to pass small bowel follow
 through.  It seems that her ileus is resolving and she probably has a partial bowel obstruction.  Kiara traylor is not ready to start feeding.  She still is in the need of TPN.  Her enterocutaneous fistula is cl
osed and there has not been any enteric drainage for two weeks.  We will stop her Sandostatin.  We wi
ll continue her TPN and relatively n.p.o. status.   The PEG tube will be placed to gravity.

 

Patient fell out of bed, and sprained her left ankle x-rays reveal absence of any fracture, splint, o
r walking boot will be ordered to improve her mobility 99.9 degrees, 98, 146/74.

 

No labs today.  NG tube output 900 mL past 24 hours, but only about 100 mL in the last 6 hours.

 

PHYSICAL EXAMINATION:

LUNGS:  Clear to auscultation.

CARDIAC:  Regular rate and rhythm without murmur or gallop.

ABDOMEN:  Soft.  Wound VAC in place.  Bowel sounds present.  She is passing flatus.  There has been n
o significant stool.

 

ASSESSMENT AND PLAN:

1.  Enterocutaneous fistula.  Apparently closed.  We will discontinue Sandostatin.

2.  Resolving ileus, partial bowel obstruction.  It is encouraging her small bowel follow through.  S
he has a contrast in her colon.  We would continue her TPN.  Remove the NG tube and place a PEG tube 
to drainage.  She can have ice chips.  We would not advance her diet until more definitive bowel func
tion, I have informed the family that she may need an NG tube.  The G-tube sometimes does not provide
 adequate drainage, but in her case it might and maybe we could have keep the NG tube out, which she 
has had so long.

3.  Ankle fracture, splint, and walking boot.

4.  DNR status.  I have talked to the family about options.  We have talked about going to an LTAC.  
Apparently, she has a brother in [place] to provide family support.  We would continue TPN through he
r PICC line and await bowel function.  Other option of course would be home care with TPN and home th
erapy and home health nursing.   is talking to the patient and the family regarding optio
ns.  Dr. Salomon covering the next few days.

## 2018-03-23 NOTE — PRG
DATE OF SERVICE:  03/22/2018

 

SUBJECTIVE:  Esthela Edwards had her NG tube clamped yesterday for small bowel follow-through.  She did
 not have progression of contrast in her colon; however, later in the study, she developed nausea and
 vomiting and NG tube placed back to suction.  She has had more than 1000 mL output.  Patient had julio c
sis, has been passing flatus, but not yet had a bowel movement, but I was seen her gastric drainage 2
4 hours prior to this morning 2050.

 

LABORATORY DATA:  White count 13, hemoglobin 7.1.  Basic metabolic profile unremarkable.  BUN 28.  Cr
eatinine 0.63, Potassium 3.6, magnesium 1.9.

 

OBJECTIVE:

LUNGS:  Clear to auscultation.

CARDIAC:  Regular rate and rhythm without rub murmur or gallop.

ABDOMEN:  Soft.  Occasional bowel sounds, flatus present.  No stool today, although she feels like ne
ed to have a bowel movement.

 

ASSESSMENT AND PLAN:  Partial bowel obstruction, it is encouraging the contrast has passed to her col
on.  It is encouraging that she tolerated the contrast with her NG tube clamped for a longer period t
cotton she did two weeks ago.  She, however, has experienced nausea and vomiting, she is not ready to ea
t.  I have talked to the family regarding the necessity of the tube and at this point she is not read
y to start eating.  Her bowels are improved, but they have not opened up enough to allow eating, we w
ould continue TPN.  Could discontinue the Sandostatin.  Continue wound care, wound VAC.  Wound is gra
nulating.  There is a foul odor to the lower wound, but there is no enteric drainage.  I have talked 
to the family regarding the NG tube.  She is getting very tired of this and has been several weeks si
nce her operation.  We will plan placement of a PEG tube to gravity drainage and minimize input and g
natasha her some time, I have talked to the family.  The patient placed management regarding disposition 
options include LTACH.  Continue TPN and G-tube to gravity drainage.  The family understands there is
 a slight possibility that she may need another type of tube if this gastrostomy tube does not provid
e adequate drainage.  Options include going to LTACH versus home with TPN, NG tube to gravity drainag
e and waiting.  Other option is hospice.  I do not think the family is ready to go to the hospice dir
ection.  She is DNR.

## 2018-03-23 NOTE — OP
DATE OF PROCEDURE:  03/23/2018

 

PREOPERATIVE DIAGNOSES:  Postoperative prolonged ileus and partial bowel obstruction, malnutrition, p
rolonged NG tube and need to get rid of.

 

POSTOPERATIVE DIAGNOSES:  Postoperative prolonged ileus and partial bowel obstruction, malnutrition, 
prolonged NG tube and need to get rid of.

 

Of note, the patient had a small bowel follow through recently and she initially tolerated but began 
vomiting, copiously has had more than 2 liters out in the next 24 hours.  She did not have a bowel mo
vement; however, x-ray suggested contrast to be in the colon and a complete bowel obstruction was not
 present.  Enterocutaneous fistula is resolving with TPN and bowel rest, and there has been no enteri
c drainage out of the open abdominal wound.

 

SURGEON:  Dr. Irwin Car.

 

ANESTHESIA:  TIVA.  Local 1% Xylocaine.

 

PROCEDURE IN DETAIL:  The patient's bedside in the operating room under intravenous sedation, endosco
pe placed per os under direct visualization, passed throughout the esophagus and the stomach, which w
as insufflated.  A good indentation noted left subcostal medially.  Local anesthetic infiltrated into
 skin and subcutaneous tissue after ChloraPrep and stab incision made, and trocar catheter induced pe
rcutaneously within the gastric lumen visualized endoscopically, placing the wire and grasping the wi
re with a snare, bringing the endoscope and wire out through the mouth.  Wire connected to the feedin
g tube device, which was lubricated and pulled back down through the mouth and the esophagus into the
 stomach and brought out and fixated to the abdominal wall with a fixation device over antibiotic oin
tment and 2x2's.  Tube tailored to length and connected to a Moran catheter bag for gravity drainage.
  The patient tolerated the procedure well.

## 2018-03-23 NOTE — RAD
LEFT FOOT THREE VIEWS:

 

HISTORY:

An 89-year-old female with a history of left foot pain.

 

FINDINGS:

Generalized soft tissue swelling.  Arthrosis changes, particularly of the first metatarsophalangeal j
oint with associated hallux valgus deformity, evidence for small Achilles and plantar calcaneal enthe
sophytes.

 

IMPRESSION:

1.  Minimal soft tissue swelling.

 

2.  Generalized degenerative changes.

 

3.  No acute fracture or dislocation.

 

4.  Progressive bone demineralization, degenerative change, and osteoarthrosis from 02/01/2013.

 

POS: OFF

## 2018-03-23 NOTE — RAD
CHEST ONE VIEW:

 

HISTORY:

An 89-year-old female with a history of fever.

 

COMPARISON:

03/19/2018

 

FINDINGS:

NG tube and right PICC lines are in place.  Bilateral vascular congestion with minimal increased line
ar and interstitial changes bilaterally, evidence for old granulomatous disease.  Biapical pleural th
ickening.  No confluent pneumonia, overt edema, or pleural effusion.

 

IMPRESSION:

Overall stable vascular congestion.  No confluent pneumonia.

 

POS: OFF

## 2018-03-24 LAB
ALBUMIN SERPL BCG-MCNC: 2.1 G/DL (ref 3.4–4.8)
ALP SERPL-CCNC: 211 U/L (ref 40–150)
ALT SERPL W P-5'-P-CCNC: 45 U/L (ref 8–55)
ANION GAP SERPL CALC-SCNC: 11 MMOL/L (ref 10–20)
AST SERPL-CCNC: 58 U/L (ref 5–34)
BASOPHILS # BLD AUTO: 0 THOU/UL (ref 0–0.2)
BASOPHILS NFR BLD AUTO: 0.1 % (ref 0–1)
BILIRUB SERPL-MCNC: 0.6 MG/DL (ref 0.2–1.2)
BUN SERPL-MCNC: 29 MG/DL (ref 9.8–20.1)
CALCIUM SERPL-MCNC: 8.8 MG/DL (ref 7.8–10.44)
CHLORIDE SERPL-SCNC: 95 MMOL/L (ref 98–107)
CO2 SERPL-SCNC: 32 MMOL/L (ref 23–31)
CREAT CL PREDICTED SERPL C-G-VRATE: 76 ML/MIN (ref 70–130)
EOSINOPHIL # BLD AUTO: 0 THOU/UL (ref 0–0.7)
EOSINOPHIL NFR BLD AUTO: 0.3 % (ref 0–10)
GLOBULIN SER CALC-MCNC: 5.8 G/DL (ref 2.4–3.5)
GLUCOSE SERPL-MCNC: 180 MG/DL (ref 83–110)
HGB BLD-MCNC: 7.7 G/DL (ref 12–16)
LYMPHOCYTES # BLD: 5.1 THOU/UL (ref 1.2–3.4)
LYMPHOCYTES NFR BLD AUTO: 30.2 % (ref 21–51)
MAGNESIUM SERPL-MCNC: 1.4 MG/DL (ref 1.6–2.6)
MCH RBC QN AUTO: 31.8 PG (ref 27–31)
MCV RBC AUTO: 97.7 FL (ref 81–99)
MONOCYTES # BLD AUTO: 1.2 THOU/UL (ref 0.11–0.59)
MONOCYTES NFR BLD AUTO: 7.2 % (ref 0–10)
NEUTROPHILS # BLD AUTO: 10.5 THOU/UL (ref 1.4–6.5)
NEUTROPHILS NFR BLD AUTO: 62.1 % (ref 42–75)
PLATELET # BLD AUTO: 496 THOU/UL (ref 130–400)
POTASSIUM SERPL-SCNC: 3.8 MMOL/L (ref 3.5–5.1)
RBC # BLD AUTO: 2.4 MILL/UL (ref 4.2–5.4)
SODIUM SERPL-SCNC: 134 MMOL/L (ref 136–145)
WBC # BLD AUTO: 16.9 THOU/UL (ref 4.8–10.8)

## 2018-03-24 RX ADMIN — Medication SCH ML: at 17:19

## 2018-03-24 RX ADMIN — CLOTRIMAZOLE SCH APPLIC: 1 CREAM TOPICAL at 09:00

## 2018-03-24 RX ADMIN — INSULIN LISPRO PRN UNIT: 100 INJECTION, SOLUTION INTRAVENOUS; SUBCUTANEOUS at 18:39

## 2018-03-24 RX ADMIN — CLOTRIMAZOLE SCH APPLIC: 1 CREAM TOPICAL at 20:24

## 2018-03-24 RX ADMIN — DEXTROSE MONOHYDRATE SCH MLS: 70 INJECTION, SOLUTION INTRAVENOUS at 14:36

## 2018-03-24 RX ADMIN — Medication SCH ML: at 20:25

## 2018-03-25 RX ADMIN — CLOTRIMAZOLE SCH APPLIC: 1 CREAM TOPICAL at 10:11

## 2018-03-25 RX ADMIN — CLOTRIMAZOLE SCH APPLIC: 1 CREAM TOPICAL at 20:49

## 2018-03-25 RX ADMIN — INSULIN LISPRO PRN UNIT: 100 INJECTION, SOLUTION INTRAVENOUS; SUBCUTANEOUS at 13:16

## 2018-03-25 RX ADMIN — DEXTROSE MONOHYDRATE SCH MLS: 70 INJECTION, SOLUTION INTRAVENOUS at 14:30

## 2018-03-25 RX ADMIN — Medication SCH ML: at 16:36

## 2018-03-25 RX ADMIN — INSULIN LISPRO PRN UNIT: 100 INJECTION, SOLUTION INTRAVENOUS; SUBCUTANEOUS at 19:29

## 2018-03-25 RX ADMIN — CLONIDINE SCH MG: 0.3 PATCH TRANSDERMAL at 16:00

## 2018-03-25 RX ADMIN — Medication SCH ML: at 20:47

## 2018-03-26 RX ADMIN — CLOTRIMAZOLE SCH APPLIC: 1 CREAM TOPICAL at 09:42

## 2018-03-26 RX ADMIN — Medication SCH: at 22:41

## 2018-03-26 RX ADMIN — INSULIN LISPRO PRN UNIT: 100 INJECTION, SOLUTION INTRAVENOUS; SUBCUTANEOUS at 17:59

## 2018-03-26 RX ADMIN — INSULIN LISPRO PRN UNIT: 100 INJECTION, SOLUTION INTRAVENOUS; SUBCUTANEOUS at 12:44

## 2018-03-26 RX ADMIN — INSULIN LISPRO PRN UNIT: 100 INJECTION, SOLUTION INTRAVENOUS; SUBCUTANEOUS at 00:57

## 2018-03-26 RX ADMIN — DEXTROSE MONOHYDRATE SCH MLS: 70 INJECTION, SOLUTION INTRAVENOUS at 15:13

## 2018-03-26 RX ADMIN — Medication SCH ML: at 09:36

## 2018-03-26 RX ADMIN — INSULIN LISPRO PRN UNIT: 100 INJECTION, SOLUTION INTRAVENOUS; SUBCUTANEOUS at 07:27

## 2018-03-26 RX ADMIN — CLOTRIMAZOLE SCH APPLIC: 1 CREAM TOPICAL at 22:41

## 2018-03-27 LAB
ALBUMIN SERPL BCG-MCNC: 2.1 G/DL (ref 3.4–4.8)
ALP SERPL-CCNC: 360 U/L (ref 40–150)
ALT SERPL W P-5'-P-CCNC: 80 U/L (ref 8–55)
ANION GAP SERPL CALC-SCNC: 14 MMOL/L (ref 10–20)
AST SERPL-CCNC: 158 U/L (ref 5–34)
BILIRUB SERPL-MCNC: 0.7 MG/DL (ref 0.2–1.2)
BUN SERPL-MCNC: 36 MG/DL (ref 9.8–20.1)
CALCIUM SERPL-MCNC: 9.8 MG/DL (ref 7.8–10.44)
CHLORIDE SERPL-SCNC: 91 MMOL/L (ref 98–107)
CO2 SERPL-SCNC: 34 MMOL/L (ref 23–31)
CREAT CL PREDICTED SERPL C-G-VRATE: 67 ML/MIN (ref 70–130)
GLOBULIN SER CALC-MCNC: 6.8 G/DL (ref 2.4–3.5)
GLUCOSE SERPL-MCNC: 212 MG/DL (ref 83–110)
HGB BLD-MCNC: 7.6 G/DL (ref 12–16)
MAGNESIUM SERPL-MCNC: 1.7 MG/DL (ref 1.6–2.6)
MCH RBC QN AUTO: 30.1 PG (ref 27–31)
MCV RBC AUTO: 97 FL (ref 81–99)
MDIFF COMPLETE?: YES
PLATELET # BLD AUTO: 530 THOU/UL (ref 130–400)
PLATELET BLD QL SMEAR: (no result)
POTASSIUM SERPL-SCNC: 4.4 MMOL/L (ref 3.5–5.1)
RBC # BLD AUTO: 2.52 MILL/UL (ref 4.2–5.4)
SODIUM SERPL-SCNC: 135 MMOL/L (ref 136–145)
WBC # BLD AUTO: 21.6 THOU/UL (ref 4.8–10.8)

## 2018-03-27 RX ADMIN — Medication SCH ML: at 21:02

## 2018-03-27 RX ADMIN — INSULIN LISPRO PRN UNIT: 100 INJECTION, SOLUTION INTRAVENOUS; SUBCUTANEOUS at 06:50

## 2018-03-27 RX ADMIN — DEXTROSE MONOHYDRATE SCH MLS: 70 INJECTION, SOLUTION INTRAVENOUS at 14:24

## 2018-03-27 RX ADMIN — CLOTRIMAZOLE SCH APPLIC: 1 CREAM TOPICAL at 09:02

## 2018-03-27 RX ADMIN — Medication SCH ML: at 09:02

## 2018-03-27 RX ADMIN — INSULIN LISPRO PRN UNIT: 100 INJECTION, SOLUTION INTRAVENOUS; SUBCUTANEOUS at 01:39

## 2018-03-27 RX ADMIN — INSULIN LISPRO PRN UNIT: 100 INJECTION, SOLUTION INTRAVENOUS; SUBCUTANEOUS at 18:05

## 2018-03-27 RX ADMIN — INSULIN LISPRO PRN UNIT: 100 INJECTION, SOLUTION INTRAVENOUS; SUBCUTANEOUS at 14:26

## 2018-03-27 RX ADMIN — CLOTRIMAZOLE SCH APPLIC: 1 CREAM TOPICAL at 21:10

## 2018-03-28 LAB
ANALYZER IN CARDIO: (no result)
BASE EXCESS STD BLDA CALC-SCNC: 0.2 MEQ/L
CA-I BLDA-SCNC: 1 MMOL/L (ref 1.12–1.3)
HCO3 BLDA-SCNC: 24.8 MEQ/L (ref 22–26)
HCT VFR BLDA CALC: 31.7 % (ref 36–47)
HGB BLDA-MCNC: 9.8 G/DL (ref 12–16)
PCO2 BLDA: 40.1 MMHG (ref 35–45)
PH BLDA: 7.41 [PH] (ref 7.35–7.45)
PO2 BLDA: 336.5 MMHG (ref 80–100)
SPECIMEN DRAWN FROM PATIENT: (no result)

## 2018-03-28 RX ADMIN — CLOTRIMAZOLE SCH APPLIC: 1 CREAM TOPICAL at 07:57

## 2018-03-28 RX ADMIN — INSULIN LISPRO PRN UNIT: 100 INJECTION, SOLUTION INTRAVENOUS; SUBCUTANEOUS at 12:31

## 2018-03-28 RX ADMIN — Medication SCH ML: at 07:52

## 2018-03-28 RX ADMIN — INSULIN LISPRO PRN UNIT: 100 INJECTION, SOLUTION INTRAVENOUS; SUBCUTANEOUS at 07:08

## 2018-03-28 RX ADMIN — CLOTRIMAZOLE SCH: 1 CREAM TOPICAL at 23:04

## 2018-03-28 RX ADMIN — INSULIN LISPRO PRN UNIT: 100 INJECTION, SOLUTION INTRAVENOUS; SUBCUTANEOUS at 01:13

## 2018-03-28 RX ADMIN — Medication SCH ML: at 21:23

## 2018-03-28 NOTE — PRG
DATE OF SERVICE:  03/27/2018

 

SUBJECTIVE:  Esthela Edwards has done well over the weekend.  Temperature 98.3 degrees, 81, 136/71.  Sh
kymberly has had fevers to 100.5 degrees yesterday, but none today.  [place] to [address].  Basic 
metabolic profile is normal except for a trend of slightly increased BUN.  Accu-Cheks have been sligh
tly elevated to 220-288.  Blood cultures obtained on 03/23/2018 are negative.  Clean catch urine reve
als vancomycin resistant Enterococcus 25,000-50,000, quite not significant.  Chest x-ray from 03/23/2
018 reveals no acute changes, no evidence of pneumonia.  Wound care yesterday revealed the wound is s
table.

 

OBJECTIVE:

LUNGS:  Clear to auscultation.

CARDIAC:  Regular rate and rhythm without murmur or gallop.

ABDOMEN:  Soft, nontender.  Bowel sounds present.  No stool today.

 

I spent approximately 45 minutes in my office talking to the family, her multiple family members incl
uding her son who lives with her.  I have previously discussed with him treatment options.  After con
sidering LTAC out of town, which they do not want versus hospice, which they are not ready for.  They
 have chosen home care with home health nursing with home infusion of TPN.  Considering this, we will
 remove her senescent PICC line and place a Lopez catheter tomorrow and plan transfer to home with 
home infusion TPN and wound care and DNR status.  The patient's overall prognosis is poor.  Family do
es not want another operation and would rather place her on hospice, then proceed with another operat
ion at this time; although, their sentiments could change in the future.  The patient is status post 
02/14/2018 six weeks ago for one-half hour adhesiolysis, two segmental small bowel resections with pr
olonged ileus and suspected high grade partial bowel obstruction.  Continue G-tube to gravity drainag
e and TPN, and place a PICC line tomorrow.

## 2018-03-29 VITALS — DIASTOLIC BLOOD PRESSURE: 73 MMHG | SYSTOLIC BLOOD PRESSURE: 142 MMHG | TEMPERATURE: 99.8 F

## 2018-03-29 PROCEDURE — 02HV33Z INSERTION OF INFUSION DEVICE INTO SUPERIOR VENA CAVA, PERCUTANEOUS APPROACH: ICD-10-PCS | Performed by: SURGERY

## 2018-03-29 RX ADMIN — INSULIN LISPRO PRN UNIT: 100 INJECTION, SOLUTION INTRAVENOUS; SUBCUTANEOUS at 20:11

## 2018-03-29 RX ADMIN — CLOTRIMAZOLE SCH APPLIC: 1 CREAM TOPICAL at 10:10

## 2018-03-29 RX ADMIN — INSULIN LISPRO PRN UNIT: 100 INJECTION, SOLUTION INTRAVENOUS; SUBCUTANEOUS at 06:46

## 2018-03-29 RX ADMIN — INSULIN LISPRO PRN UNIT: 100 INJECTION, SOLUTION INTRAVENOUS; SUBCUTANEOUS at 12:31

## 2018-03-29 RX ADMIN — Medication SCH ML: at 09:18

## 2018-03-29 NOTE — RAD
PORTABLE CHEST 1 VIEW:

 

Date:  03/29/18

Time:  1440 hours

 

HISTORY:  

Central line placement. 

 

FINDINGS/IMPRESSION: 

 

Comparison made with exam of 03/23/18. 

 

NG tube has been removed in the interim. The right upper extremity PICC line remains in place. There 
has been interval placement of a right subclavian central venous catheter with tip in the projection 
of the SVC. No pneumothoraces are seen. The heart size is normal. The aorta is tortuous. Mild promine
nce of the pulmonary vascularity is again seen. No lobar consolidation, pneumothoraces, or large effu
sions are identified. Postop changes of right shoulder arthroplasty are redemonstrated. 

 

 

POS: [facility]

## 2018-03-30 ENCOUNTER — HOSPITAL ENCOUNTER (EMERGENCY)
Dept: HOSPITAL 92 - ERS | Age: 83
Discharge: HOME | End: 2018-03-30
Payer: MEDICARE

## 2018-03-30 DIAGNOSIS — Z79.899: ICD-10-CM

## 2018-03-30 DIAGNOSIS — F41.9: ICD-10-CM

## 2018-03-30 DIAGNOSIS — E11.9: ICD-10-CM

## 2018-03-30 DIAGNOSIS — E78.5: ICD-10-CM

## 2018-03-30 DIAGNOSIS — K94.29: Primary | ICD-10-CM

## 2018-03-30 DIAGNOSIS — N39.0: ICD-10-CM

## 2018-03-30 DIAGNOSIS — Z79.82: ICD-10-CM

## 2018-03-30 DIAGNOSIS — E87.6: ICD-10-CM

## 2018-03-30 DIAGNOSIS — J18.9: ICD-10-CM

## 2018-03-30 LAB
ALBUMIN SERPL BCG-MCNC: 2.3 G/DL (ref 3.4–4.8)
ALP SERPL-CCNC: 307 U/L (ref 40–150)
ALT SERPL W P-5'-P-CCNC: 56 U/L (ref 8–55)
ANION GAP SERPL CALC-SCNC: 15 MMOL/L (ref 10–20)
AST SERPL-CCNC: 56 U/L (ref 5–34)
BACTERIA UR QL AUTO: (no result) HPF
BASOPHILS # BLD AUTO: 0 THOU/UL (ref 0–0.2)
BASOPHILS NFR BLD AUTO: 0.1 % (ref 0–1)
BILIRUB SERPL-MCNC: 0.4 MG/DL (ref 0.2–1.2)
BUN SERPL-MCNC: 35 MG/DL (ref 9.8–20.1)
CALCIUM SERPL-MCNC: 9.8 MG/DL (ref 7.8–10.44)
CHLORIDE SERPL-SCNC: 89 MMOL/L (ref 98–107)
CO2 SERPL-SCNC: 33 MMOL/L (ref 23–31)
CREAT CL PREDICTED SERPL C-G-VRATE: 0 ML/MIN (ref 70–130)
CRYSTAL-AUWI FLAG: 0 (ref 0–15)
EOSINOPHIL # BLD AUTO: 0.1 THOU/UL (ref 0–0.7)
EOSINOPHIL NFR BLD AUTO: 0.4 % (ref 0–10)
GLOBULIN SER CALC-MCNC: 6.5 G/DL (ref 2.4–3.5)
GLUCOSE SERPL-MCNC: 265 MG/DL (ref 83–110)
HEV IGM SER QL: 0.1 (ref 0–7.99)
HGB BLD-MCNC: 7.8 G/DL (ref 12–16)
HYALINE CASTS #/AREA URNS LPF: (no result) LPF
LYMPHOCYTES # BLD: 4.1 THOU/UL (ref 1.2–3.4)
LYMPHOCYTES NFR BLD AUTO: 20.9 % (ref 21–51)
MCH RBC QN AUTO: 30 PG (ref 27–31)
MCV RBC AUTO: 94.6 FL (ref 81–99)
MONOCYTES # BLD AUTO: 1.5 THOU/UL (ref 0.11–0.59)
MONOCYTES NFR BLD AUTO: 7.4 % (ref 0–10)
NEUTROPHILS # BLD AUTO: 14.1 THOU/UL (ref 1.4–6.5)
NEUTROPHILS NFR BLD AUTO: 71.2 % (ref 42–75)
PATHC CAST-AUWI FLAG: 0.29 (ref 0–2.49)
PLATELET # BLD AUTO: 605 THOU/UL (ref 130–400)
POTASSIUM SERPL-SCNC: 2.8 MMOL/L (ref 3.5–5.1)
RBC # BLD AUTO: 2.59 MILL/UL (ref 4.2–5.4)
RBC UR QL AUTO: (no result) HPF (ref 0–3)
SODIUM SERPL-SCNC: 134 MMOL/L (ref 136–145)
SP GR UR STRIP: 1.02 (ref 1–1.04)
SPERM-AUWI FLAG: 0 (ref 0–9.9)
WBC # BLD AUTO: 19.8 THOU/UL (ref 4.8–10.8)
WBC UR QL AUTO: (no result) HPF (ref 0–3)
YEAST-AUWI FLAG: 0 (ref 0–25)

## 2018-03-30 PROCEDURE — B4087 GASTRO/JEJUNO TUBE, STD: HCPCS

## 2018-03-30 PROCEDURE — 87077 CULTURE AEROBIC IDENTIFY: CPT

## 2018-03-30 PROCEDURE — 87186 SC STD MICRODIL/AGAR DIL: CPT

## 2018-03-30 PROCEDURE — 81015 MICROSCOPIC EXAM OF URINE: CPT

## 2018-03-30 PROCEDURE — 51701 INSERT BLADDER CATHETER: CPT

## 2018-03-30 PROCEDURE — 36416 COLLJ CAPILLARY BLOOD SPEC: CPT

## 2018-03-30 PROCEDURE — 96365 THER/PROPH/DIAG IV INF INIT: CPT

## 2018-03-30 PROCEDURE — 71045 X-RAY EXAM CHEST 1 VIEW: CPT

## 2018-03-30 PROCEDURE — 87040 BLOOD CULTURE FOR BACTERIA: CPT

## 2018-03-30 PROCEDURE — 81003 URINALYSIS AUTO W/O SCOPE: CPT

## 2018-03-30 PROCEDURE — 74176 CT ABD & PELVIS W/O CONTRAST: CPT

## 2018-03-30 PROCEDURE — 96368 THER/DIAG CONCURRENT INF: CPT

## 2018-03-30 PROCEDURE — 96375 TX/PRO/DX INJ NEW DRUG ADDON: CPT

## 2018-03-30 PROCEDURE — 85025 COMPLETE CBC W/AUTO DIFF WBC: CPT

## 2018-03-30 PROCEDURE — 96366 THER/PROPH/DIAG IV INF ADDON: CPT

## 2018-03-30 PROCEDURE — 36415 COLL VENOUS BLD VENIPUNCTURE: CPT

## 2018-03-30 PROCEDURE — 93005 ELECTROCARDIOGRAM TRACING: CPT

## 2018-03-30 PROCEDURE — A4353 INTERMITTENT URINARY CATH: HCPCS

## 2018-03-30 PROCEDURE — 87086 URINE CULTURE/COLONY COUNT: CPT

## 2018-03-30 PROCEDURE — 80053 COMPREHEN METABOLIC PANEL: CPT

## 2018-03-30 NOTE — OP
DATE OF OPERATION:  03/29/2018

 

PREOPERATIVE DIAGNOSES:  Malnutrition, prolonged ileus, partial bowel obstruction secondary to perito
kiersten adhesions.

 

POSTOPERATIVE DIAGNOSES:  Bowel obstruction resolving of cutaneous fistula, open wound abdomen, poor 
IV access and need of TPN access, central access, PICC line.

 

PROCEDURE:  Placement of a Lopez catheter, dual lumen, right subclavian vein.  Fluoroscopy used.

 

SURGEON:  Irwin Car MD

 

ANESTHESIA:  Local 0.5% Marcaine with epinephrine, 30 mL, mixed with 2% Xylocaine, 10 mL.

 

PROCEDURE IN DETAIL:  At the patient's bedside, her chest and neck were prepared with chloraprep, erin
ped in routine fashion.  Local anesthetic mixture infiltrating skin and subcutaneous tissue about the
 operative site with 0.5% Marcaine with epinephrine 30 mL mixed with Xylocaine, 10 mL mixture used.  
Trocar catheter cannulated the subclavian vein.  J wire threaded.  Trocar catheter removed.  Skin inc
ised and enlarged sharply.  Dilator and pull-away sheath placed over the J-wire into the superior jovany
a cava and dilator and J-wire were removed.  Catheter placed through the pull-away sheath after tailo
ring to length and cuff placed beneath the skin as the pull-away sheath was removed.  Catheter secure
d with 2 interrupted suture of 3-0 Prolene.  Biopatch sterile dressing applied.  Each port aspirated 
blood and flushed with saline solution.  Fluoroscopic images revealed good line placement.

## 2018-03-30 NOTE — DIS
PROCEDURES THIS HOSPITALIZATION:  

1.  02/14/2018 - Left subclavian vein central line, laparotomy with 4 hours of adhesiolysis, 2 segmen
ts of small bowel resection, terminal ileum slightly more than 1 foot and jejunum approximately 3 inc
hes, closure of enterotomy, Seprafilm application, VAC application to subcutaneous tissues.  

2.  03/23/2018 - PEG tube placement.  

3.  03/29/2018 - Lopez catheter placement.

4.  03/07/2018 - PICC line placement.  

 

CONSULTATIONS:  

1.  Pulmonary Medicine, Dr. Christina 

2.  Nephrology, Dr. Silverman

3.  Cardiology, Dr. Levine  

 

DISCHARGE DIAGNOSES:  

1.  Severe peritoneal adhesions with obstruction. 

2.  Hypertension.

3.  Diabetes mellitus.

4.  History of coronary artery disease, mild.

5.  Chronic obstructive pulmonary disease.  

6.  History of pulmonary embolus.

7.  Chronic back pain on fentanyl patch.

8.  Proximal atrial fibrillation.

 

PAST SURGICAL HISTORY:  Hysterectomy, cholecystectomy, knee surgery, small bowel surgery several year
s ago requiring 2-3 hour operation with a more than 3 week ileus performed at another facility.

 

IMAGING PROCEDURES:  Abdominal pelvis CAT scan 02/13/2018, PICC line placement  03/07/2018.  Small bernarda
wel follow through 03/08/2018 no progress.  Repeat small bowel follow through 03/21/2018 revealed non
obstructive bowel gas pattern, no evidence of air filled dilated small bowel loops, contrast distende
d the stomach travels through multiple small bowel loops, jejunal loops were released, mildly dilated
 contrast and reaches the right hemicolon mixed with a large amount of feces in that location after a
 9 hour study; however, during the study the patient experienced nausea and vomiting after a delayed 
period of time requiring NG tube placed back to suction.

 

DISCHARGE MEDICATIONS:  Home TPN.  Tylenol per PEG tube orally and clamping the PEG tube for 2 hours 
afterwards, clonidine TTS patch 3 q.7 days, prescription given.  TPN infusion ordered, Duragesic patc
hes 50 mg transdermal every 3 days, sips and chips, hard candy and gum as tolerated

 

CODE STATUS:   DNR status.

 

HISTORY:  An 89-year-old female presented to the hospital 02/13/2018.  CAT scan suggests complete bow
el obstruction.  NG tube placed.  She had fecal output foul smelling.  She had not passed any flatus 
or stool.  Abdomen was very tender on the left with guarding.

 

White count 16,000, hemoglobin 11.6, carbon dioxide 31, creatinine 3.27, terribly dehydrated.  Dr. Sebastien malik saw her in consultation per emergency room request.  The renal function improved with hydration.

 

HOME MEDICATIONS:  Zanaflex 4 tablets at bedtime, Adalat 500 mg p.r.n. pain, discontinued due to acut
e kidney injury, amlodipine 5/320 daily, hydrocodone p.r.n. pain, gabapentin 300 mg t.i.d., Advair Di
skus inhalers, INH b.i.d., Celexa 10 mg daily, carvedilol 12.5 mg t.i.d., aspirin 81 mg daily, metfor
min 500, 1700, hydralazine 75 t.i.d., Duragesic patch 50 mcg q. 3 days, Crestor 10 mg at bedtime, Duo
Nebs as needed, insulin subcu at bedtime 5 units and 10 units a.m., insulin NovoLog 70/30 FlexPen as 
needed.

 

Past surgical history as noted above.  The patient was admitted.  NG tube placed overnight and then t
he patient was taken to the operating room for the above described operation.  Postoperatively she re
mained on the ventilator and was followed Dr. Christina and weaned toward extubation.  She had a prolonged
 ileus.  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
und.  This was managed with Sandostatin IV 3 times a day, bowel rest, NG tube and TPN.  With time, th
is resolved, Sandostatin was discontinued.  She had a small bowel through performed 03/08/2018 that s
he did not tolerate at all.  There was no progression of contrast to the colon.  NG tube placed back 
to suction and after that she had 3 liters of output diminishing over time to 400-600 per day.  She h
ad a smear of stool after that and developed better bowel sounds and repeat small bowel follow throug
h performed with results as described above.  She, however, never had a bowel movement.  She grew tir
ed of the NG tube and DNR established, made and the plan at this time is discharge home with DNR stat
us.  Continue supportive care with home TPN infusion through a Lopez catheter placed the day of dis
charge.  Follow up in my office in 2-3 weeks.  [facility] is seeing her with home wound VAC
 2-3 times a week changed.  Home infusion TPN.

## 2018-03-30 NOTE — CON
EMERGENCY ROOM EVALUATION:  03/30/2018

 

An 89-year-old female who was discharged from the hospital yesterday to home with home TPN via [place]
n catheter and DNR status.  The patient has a postoperative high grade to moderate grade bowel obstru
ction and ileus and does not have any GI function, has a G-tube to gravity and has TPN in place.  She
 had a 4-hour adhesiolysis with small bowel resection and developed an enterocutaneous fistula postop
eratively that resolved with Sandostatin, NG tube, TPN, and approximately a week ago had a small lionel
l through demonstrating contrast in her colon within a few hours, but she did not tolerate this well 
with nausea, vomiting, and thus diagnosed with moderate partial bowel obstruction postoperatively, dominguez
ch that she cannot tolerate liquids.  It is not a good time to consider reoperation.  The patient connor
s not want a reoperation.  She desires DNR status and wants to go home.  Options were given for an LT
AC versus rehab versus nursing home, but they wanted to go home.  Arrangements were made for home hea
lth, guardian to take care of her wound care, VAC and infusion therapy, TPN, which I am managing.  Th
e patient had a G-tube, PEG placed on 03/23/2018.  Patient's PEG tube was dislodged and the family br
ought her to the emergency room.  Fortunately, the PEG tube was replaced and CAT scan verified placem
ent with contrast.  The patient has baseline abdominal pain.  Her abdominal exam is unchanged.  In ad
dition, the labs were found to have potassium of 2.8 and chest x-ray obtained was read by the emergen
cy room physician suspicious for the pneumonia.  The patient does not have cough or fever or congesti
on.  He is breathing normally.  My interpretation of the chest x-ray, she has interstitial prominence
 and there is no change in the chest x-ray relative to the past 2 days.  She does not have pneumonia.
  Since that evaluation by the time of this dictation, chest x-ray interpreted by Radiology reveals t
he same interpretation and mine is that she does not have pneumonia.  The patient's urine cultures la
st week suggested VRE less than 10,000 or 58600, but my update review of this today reveals greater t
cotton 10 to the 5th VRE.  It is sensitive to high dose AMINOGLYCOSIDES and her vancomycin in the emerge
ncy room was canceled.  She received a dose of Rocephin prior to my arrival.  In addition, she was gi
jovany a dose of 80 mg of gentamicin single dose.  She is sent home with Zyvox 600 mg p.o. b.i.d. master herrera in her G-tube for 12 hours afterwards.  Lungs are clear to auscultation.  Abdomen is soft, baseline
 tenderness.  Wound VAC in place.  G-tube to gravity drainage.  G-tube was secured to the abdominal w
all.  Family present and is instructed on G-tube care.  I have since called regarding home health and
 updated him on these findings.  Family wants to go home.  Family is being instructed on infusion the
rapy for home TPN.  She has followup laboratories on Monday ordered.  She will have followup in  of
herbie in 2 weeks.  [facility] has my personal cell phone number and home number to call m
e as did the family members for any concerns.  Patient instead of being admitted, will be discharged 
home.

## 2018-03-30 NOTE — RAD
CHEST FRONTAL VIEW:

 

Date:  03/30/18 

 

COMPARISON:  

03/29/18. 

 

INDICATION:

Chest pain. 

 

FINDINGS:

There is a supportive catheter with coiled tubing overlying the right chest. Catheter terminates at t
he SVC region, similar appearing. Prior right PICC line has been removed. Interstitial prominence rem
ains throughout each lung. Stable prominence to the cardiomediastinal silhouette. There remains eleva
tion of the right hemidiaphragm. 

 

IMPRESSION: 

1.  No new consolidation. Interstitial prominence of each lung persists. 

2.  Interval removal of right PICC line. 

 

 

POS: [facility]

## 2018-03-30 NOTE — RAD
LEFT WRIST RADIOGRAPH SERIES:

 

Date:  03/30/18 

 

CLINICAL HISTORY:  

Pain. 

 

FINDINGS:

There is marked degenerative hypertrophy with heterotopic bone about the wrist. Prominent associated 
joint space narrowing is present. Chondrocalcinosis is seen. No evidence of acute fracture visualized
. Slight widening of the scapholunate distance. 

 

IMPRESSION: 

1.  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 

2.  There is no obvious acute osseous abnormality. 

 

 

POS: [facility]

## 2018-03-30 NOTE — CT
PRELIMINARY REPORT/[facility]/EMERGENCY AFTER

HOURS PROCEDURE:

 

EXAM:

CT Abdomen and Pelvis Without Intravenous Contrast

 

CLINICAL HISTORY:

89 years old, female; Device placement; Gi device; Peg tube; Patient HX: Additional history obtained 
from ems, 89 yo f. Pt presents with peg tube dislodged this morning by accident. Reports to ed for in
sertion. Family reports that this tube is for her to pass stool with, that she was recently admitted 
for bowel obstruction, and dc'd home to continue tpn.  states that she never had a bm while in
patient and still hasn't had a bm at home.  still reporting pt having continued discomfort and
 distention.

 

TECHNIQUE:

Axial computed tomography images of the abdomen and pelvis without intravenous contrast.

Coronal reformatted images were created and reviewed.

 

COMPARISON:

No relevant prior studies available.

 

FINDINGS:

Lung bases: There is a 2.5 cm consolidation in the peripheral basal right lower lobe suspicious for s
mall area of pneumonia. There is intrathoracic calcification consistent with chronic pulmonary granul
omatous inflammatory disease.

Heart: There is incompletely imaged coronary vascular calcification.

 

ABDOMEN:

Liver: There is a mildly nodular contour of the hepatic margin which could represent mild cirrhosis.

There is hepatomegaly measuring 21 cm craniocaudal length.

Gallbladder and bile ducts: The gallbladder is absent. No ductal dilation.

Pancreas: Unremarkable. No ductal dilation.

Spleen: The spleen demonstrates punctate calcification, consistent with remote granulomatous organism
 exposure.

Adrenals: Unremarkable. No mass.

Kidneys and ureters: There is a simple fluid density probable cyst in the left kidney. There is no ac
[place] renal process. No obstructing stones. No hydronephrosis.

Stomach and bowel: There are surgical changes of the small bowel in the midline anterior pelvis. Ther
e is abnormal opacity of the mesentery in the lower abdomen and pelvis which could be acute edema, ve
rsus granulation tissue or scar. There is contrast within the right colon. There is no bowel dilation
 or evidence of bowel obstruction. There is minimal retained colonic stool in the distal sigmoid colo
n and rectum.

Appendix: No findings to suggest acute appendicitis.

 

PELVIS:

Bladder: Unremarkable. No stones.

Reproductive: The uterus is absent.

 

ABDOMEN and PELVIS:

Intraperitoneal space: See above.

Bones/joints: No acute fracture. No dislocation.

Soft tissues: There is a cutaneous wound VAC at the umbilicus. There is low density material within t
he umbilicus. Small bowel loops do approximate the ventral abdominal wall near this location and ther
e could be an occult enterocutaneous fistula. There is mild edema along the PEG tube tract in the sof
t tissues, consistent with recent manipulation. There is no abscess or organized fluid collection.

Vasculature: See above.

Lymph nodes: Unremarkable. No enlarged lymph nodes.

Tubes, lines and devices: There is a normally positioned PEG tube with balloon inflated in the distal
 stomach.

 

IMPRESSION:

1. There are surgical changes of the small bowel in the midline anterior pelvis. There is abnormal op
acity of the mesentery in the lower abdomen and pelvis which could be acute edema, versus granulation
 tissue or scar.

2. There is contrast within the right colon. There is no bowel dilation or evidence of bowel obstruct
ion. There is minimal retained colonic stool in the distal sigmoid colon and rectum.

3. There is a cutaneous wound VAC at the umbilicus. There is low density material within the umbilicu
s. Small bowel loops do approximate the ventral abdominal wall near this location and there could be 
an occult enterocutaneous fistula.

4. There is mild edema along the PEG tube tract in the soft tissues, consistent with recent manipulat
ion. There is no abscess or organized fluid collection.

5. There is a 2.5 cm consolidation in the peripheral basal right lower lobe suspicious for small area
 of pneumonia.

 

This interpretation was based upon the receipt of 276 image(s).

 

Thank you for allowing us to participate in the care of your patient.

 

Dictated and Authenticated by: Salinas Beck DO

03/30/2018 6:54 AM Central Time ([place] & [place])

 

 

 

FINAL REPORT 

CT ABDOMEN AND PELVIS WITHOUT CONTRAST: 

 

Date:  03/30/18 

 

FINDINGS/IMPRESSION: 

I agree with the preliminary report given by Dr. Salinas Beck of [facility].

 

POS: [facility]

## 2018-03-30 NOTE — OP
DATE OF PROCEDURE:  03/29/2018

 

PREOPERATIVE DIAGNOSES:  Malnutrition, ileus, partial bowel obstruction, peritoneal adhesions, in nee
d of IV access, senescent PICC line, leukocytosis.

 

POSTOPERATIVE DIAGNOSES:  Malnutrition, ileus, partial bowel obstruction, periotenal adhesions, in ne
ed of IV access, senescent PICC line, leukocytosis.

 

PROCEDURE:  Right subclavian vein dual lumen Lopez catheter.

 

SURGEON:  Dr. Irwin Car.

 

ANESTHESIA:  Fluoroscopy.

 

PROCEDURE IN DETAIL:  Patient was taken to the operating room where in the supine position, neck and 
chest prepped with ChloraPrep, draped in routine fashion.  Local anesthetic mixture 0.5% Marcaine wit
h epinephrine 30 mL mixed with 2% Xylocaine, 10 mL infiltrated into skin and subcutaneous tissue abou
t the operative site.  Trocar cannulated the right subclavian vein.  Good return of venous blood.  J-
wire threaded.  Trocar catheter removed.  Skin was incised and enlarged sharply.  Dilator and pull-aw
ay sheath placed over the J-wire under fluoroscopic visualization in the superior vena cava and dilat
or and J-wire removed.  Catheter was placed with pull-away sheath and pull-away sheath removed.  Cath
eter had been tailored to length and tip noted to be in the superior vena cava and cuff beneath the s
kin exit site.  Catheter secured with 2 interrupted sutures of 3-0 nylon.  Biopatch sterile dressings
 applied.  Patient tolerated the procedure well. Hair Texture (Optional): mixed Fluence: 25 Topical Anesthesia?: BLT cream (benzocaine 20%, lidocaine 6%, tetracaine 4%) Laser Type: Silas Arora (fine tip) Post-Care Instructions: I reviewed with the patient in detail post-care instructions. Patient should avoid sun for a minimum of 4 weeks before and after treatment. External Cooling Fan Speed: 0 Pulse Duration: 30 ms Skin Type (Optional): III Cooling: chill tip Price (Use Numbers Only, No Special Characters Or $): 190.00 Length Of Topical Anesthesia Application (Optional): 20 minutes Endpoint: Immediate endpoint: perifollicular erythema and edema. Elta Laser Enzyme Gel and Intellishade SPF applied post. Post care reviewed with patient, told to use mint scrub on day 5 for 5 days in a row. Total Pulses: 109 Consent: Written consent obtained, risks reviewed including but not limited to crusting, scabbing, blistering, scarring, darker or lighter pigmentary change, paradoxical hair regrowth, incomplete removal of hair and infection. Detail Level: Zone

## 2018-03-30 NOTE — PRG
DATE OF SERVICE:  03/28/2018

 

SUBJECTIVE:  Esthela Edwards is doing well today.  Wednesday planning Lopez catheter placement postpo
adam due to long case load.  The patient does not have any new complaints.  Abdominal soreness is impr
julio c from yesterday.

 

OBJECTIVE:

LUNGS:  Clear to auscultation.

CARDIAC:  Regular rate and rhythm without murmur or gallop.

ABDOMEN:  Soft.  G-tube to gravity.

 

LABORATORY DATA:  No new labs today.  Accu-Cheks [id number].

 

ASSESSMENT AND PLAN:  A Lopez catheter for home TPN, G-tube to gravity, palliative measures, DNR st
atus.  High grade partial bowel obstruction.  Continue monitoring.  No evidence of bowel movement sin
ce surgery on 02/14/2018.

## 2018-03-31 ENCOUNTER — HOSPITAL ENCOUNTER (EMERGENCY)
Dept: HOSPITAL 92 - ERS | Age: 83
Discharge: HOME | End: 2018-03-31
Payer: MEDICARE

## 2018-03-31 DIAGNOSIS — I10: ICD-10-CM

## 2018-03-31 DIAGNOSIS — E78.5: ICD-10-CM

## 2018-03-31 DIAGNOSIS — Z79.899: ICD-10-CM

## 2018-03-31 DIAGNOSIS — Z46.59: Primary | ICD-10-CM

## 2018-03-31 DIAGNOSIS — Z86.711: ICD-10-CM

## 2018-03-31 DIAGNOSIS — E11.9: ICD-10-CM

## 2018-03-31 DIAGNOSIS — Z79.82: ICD-10-CM

## 2018-03-31 PROCEDURE — B4087 GASTRO/JEJUNO TUBE, STD: HCPCS

## 2018-03-31 PROCEDURE — 43760: CPT

## 2018-03-31 PROCEDURE — 74018 RADEX ABDOMEN 1 VIEW: CPT

## 2018-03-31 NOTE — RAD
KUB

3/31/18

 

INDICATION: 

PEG placement.

 

FINDINGS:  

There is contrast injected into the PEG catheter with contrast filling the lumen of the stomach. Ther
e is some residual contrast within the colon seen on comparison study of 3/22/18. Cholecystectomy cli
ps seen within the right upper quadrant. Bowel pattern is nonobstructed. No free air is evident. Ther
e is elevation of the right hemidiaphragm. 

 

IMPRESSION:  

No evidence of leak at the patient's PEG tube site.

 

POS: [facility]

## 2018-04-18 NOTE — EKG
Test Reason : 

Blood Pressure : ***/*** mmHG

Vent. Rate : 085 BPM     Atrial Rate : 085 BPM

   P-R Int : 154 ms          QRS Dur : 086 ms

    QT Int : 392 ms       P-R-T Axes : 020 026 042 degrees

   QTc Int : 466 ms

 

Normal sinus rhythm

Normal ECG

 

Confirmed by DERECK CATHERINE ([id number]),  ZUNILDA JOHNSON ([id number]) on 4/18/2018 3:05:21 PM

 

Referred By:             Confirmed By:DERECK CATHERINE

## 2023-09-12 NOTE — PRG
DATE OF SERVICE:  02/24/2018

 

SUBJECTIVE:  Ms. Esthela Edwards remains in IMCU.  She has an NG tube in place.  Nursing again continue
 to do an excellent job getting her out of bed several times a day.  She is near a total lift.  NG tu
be has remained in place with soft restraints.  Output last in 24 hours is 2430.  She has had 1100 mL
 urine output.

 

LABORATORY DATA:  This morning, her white count 17, hemoglobin 10, sodium 146, carbon dioxide 33, BUN
 90, creatinine 1.14, glucose is 225 to 292.

 

OBJECTIVE:

LUNGS:  Clear to auscultation.

CARDIAC:  Regular rate and rhythm without murmur or gallop.

ABDOMEN:  Soft, rare bowel sounds.

EXTREMITIES:  No ankle edema.

 

ASSESSMENT AND PLAN:

1.  Diabetes.  Accu-Cheks.  Much improved with insulin addition of TPN.  We will add Levemir insulin.


2.  Malnutrition.

3.  Ileus, prolonged.  10 days status post 4-1/2 hour adhesiolysis and two bowel resections.  Await G
I function.  Continue mobility.

4.  Paroxysmal atrial fibrillation/flutter.  Medical treatment per Cardiology with parenteral medicat
ions. 95